# Patient Record
Sex: MALE | Race: WHITE | Employment: FULL TIME | URBAN - METROPOLITAN AREA
[De-identification: names, ages, dates, MRNs, and addresses within clinical notes are randomized per-mention and may not be internally consistent; named-entity substitution may affect disease eponyms.]

---

## 2017-01-20 ENCOUNTER — TELEPHONE (OUTPATIENT)
Dept: FAMILY MEDICINE | Facility: CLINIC | Age: 42
End: 2017-01-20

## 2017-01-20 RX ORDER — ATORVASTATIN CALCIUM 40 MG/1
40 TABLET, FILM COATED ORAL DAILY
Qty: 90 TABLET | Refills: 0 | Status: SHIPPED | OUTPATIENT
Start: 2017-01-20 | End: 2017-07-31

## 2017-01-20 NOTE — TELEPHONE ENCOUNTER
Pt notified refill sent to pharmacy and he will be due for fasting lab and ov in April.  Thalia Clarke RN

## 2017-01-20 NOTE — TELEPHONE ENCOUNTER
Patient is currently out of town for work  running very low on his atorvastatin, wondering if a script could be called in for this. Please call to advise.

## 2017-07-13 NOTE — LETTER
[facility]                                             [address] [place]  [address]    July 14, 2017    Norberto Doty  [address]  [place] [address]    Dear Norberto,       We recently received a refill request for metFORMIN (GLUCOPHAGE-XR) 500 MG 24 hr tablet.  We have refilled this for a one time supply only because you are due for a:    Diabetes office visit      Please call at your earliest convenience so that there will not be a delay with your future refills.          Thank you,   Your [facility] Care Team/ks  [phone]

## 2017-07-14 RX ORDER — METFORMIN HCL 500 MG
TABLET, EXTENDED RELEASE 24 HR ORAL
Qty: 120 TABLET | Refills: 0 | Status: SHIPPED | OUTPATIENT
Start: 2017-07-14 | End: 2017-07-31

## 2017-07-14 NOTE — TELEPHONE ENCOUNTER
Medication is being filled for 1 time refill only due to:  Patient needs to be seen because overdue for office visit for diabetes.    please send letter. Angela Velazco RN

## 2017-07-25 ENCOUNTER — TELEPHONE (OUTPATIENT)
Dept: FAMILY MEDICINE | Facility: CLINIC | Age: 42
End: 2017-07-25

## 2017-07-25 NOTE — LETTER
[facility]  [address] [facility] [address]  Phone: [phone]    07/25/17    Norberto Doty  [address]  [place] [address]      To whom it may concern:     Our records indicate that you have not scheduled for a(n)appointment with  Triston Hills MD for a diabetic check which was recommended by your health care team. Monitoring and managing your preventative and chronic health conditions are very important to us.     If you have received your health care elsewhere, please provide us with that information so it can be documented in your chart.    Please call [phone] or message us through your Sqoot account to schedule an appointment or provide information for your chart.     I look forward to seeing you and working with you on your health care needs.       *If you have already scheduled an appointment, please disregard this reminder      Sincerely,      Triston Hills MD/mark

## 2017-07-25 NOTE — TELEPHONE ENCOUNTER
Panel Management Review      Patient has the following on his problem list:     Diabetes    ASA: Not Required     Last A1C  Lab Results   Component Value Date    A1C 6.8 06/27/2016    A1C 14.6 04/21/2016     A1C tested: MONITOR    Last LDL:    Lab Results   Component Value Date    CHOL 180 04/21/2016     Lab Results   Component Value Date    HDL 37 04/21/2016     Lab Results   Component Value Date     04/21/2016     Lab Results   Component Value Date    TRIG 149 04/21/2016     No results found for: CHOLHDLRATIO  Lab Results   Component Value Date    NHDL 143 04/21/2016       Is the patient on a Statin? YES             Is the patient on Aspirin? NO    Medications     HMG CoA Reductase Inhibitors    atorvastatin (LIPITOR) 40 MG tablet    Salicylates    aspirin  MG tablet          Last three blood pressure readings:  BP Readings from Last 3 Encounters:   08/02/16 117/75   07/31/16 145/80   05/23/16 132/76       Date of last diabetes office visit: 2016     Tobacco History:     History   Smoking Status     Never Smoker   Smokeless Tobacco     Never Used             Composite cancer screening  Chart review shows that this patient is due/due soon for the following None  Summary:    Patient is due/failing the following:   A1C    Action needed:   Patient needs office visit for diabetic check.    Type of outreach:    Sent letter.    Questions for provider review:    None                                                                                                                                    Dillon Davies CMA       Chart routed to closed .

## 2017-07-31 ENCOUNTER — OFFICE VISIT (OUTPATIENT)
Dept: FAMILY MEDICINE | Facility: CLINIC | Age: 42
End: 2017-07-31
Payer: COMMERCIAL

## 2017-07-31 VITALS
TEMPERATURE: 97.5 F | WEIGHT: 315 LBS | HEART RATE: 85 BPM | BODY MASS INDEX: 42.66 KG/M2 | SYSTOLIC BLOOD PRESSURE: 135 MMHG | DIASTOLIC BLOOD PRESSURE: 80 MMHG | HEIGHT: 72 IN

## 2017-07-31 DIAGNOSIS — E66.01 MORBID OBESITY DUE TO EXCESS CALORIES (H): Primary | ICD-10-CM

## 2017-07-31 LAB
CREAT SERPL-MCNC: 0.9 MG/DL (ref 0.66–1.25)
CREAT UR-MCNC: 147 MG/DL
GFR SERPL CREATININE-BSD FRML MDRD: NORMAL ML/MIN/1.7M2
HBA1C MFR BLD: 9.4 % (ref 4.3–6)
MICROALBUMIN UR-MCNC: 49 MG/L
MICROALBUMIN/CREAT UR: 33.4 MG/G CR (ref 0–17)

## 2017-07-31 PROCEDURE — 83036 HEMOGLOBIN GLYCOSYLATED A1C: CPT | Performed by: FAMILY MEDICINE

## 2017-07-31 PROCEDURE — 99207 C PAF COMPLETED  NO CHARGE: CPT | Performed by: FAMILY MEDICINE

## 2017-07-31 PROCEDURE — 99214 OFFICE O/P EST MOD 30 MIN: CPT | Mod: 25 | Performed by: FAMILY MEDICINE

## 2017-07-31 PROCEDURE — 36415 COLL VENOUS BLD VENIPUNCTURE: CPT | Performed by: FAMILY MEDICINE

## 2017-07-31 PROCEDURE — 82043 UR ALBUMIN QUANTITATIVE: CPT | Performed by: FAMILY MEDICINE

## 2017-07-31 PROCEDURE — 82565 ASSAY OF CREATININE: CPT | Performed by: FAMILY MEDICINE

## 2017-07-31 PROCEDURE — 99207 C FOOT EXAM  NO CHARGE: CPT | Mod: 25 | Performed by: FAMILY MEDICINE

## 2017-07-31 RX ORDER — METFORMIN HCL 500 MG
TABLET, EXTENDED RELEASE 24 HR ORAL
Qty: 120 TABLET | Refills: 0 | Status: SHIPPED | OUTPATIENT
Start: 2017-07-31 | End: 2017-08-07

## 2017-07-31 RX ORDER — LISINOPRIL 2.5 MG/1
2.5 TABLET ORAL DAILY
Qty: 90 TABLET | Refills: 0 | Status: SHIPPED | OUTPATIENT
Start: 2017-07-31 | End: 2017-09-27

## 2017-07-31 RX ORDER — ATORVASTATIN CALCIUM 40 MG/1
40 TABLET, FILM COATED ORAL DAILY
Qty: 90 TABLET | Refills: 0 | Status: SHIPPED | OUTPATIENT
Start: 2017-07-31 | End: 2017-09-27

## 2017-07-31 NOTE — PROGRESS NOTES
Diabetic check   Was off medications for a couple weeks because he ran out of them but is taking them regularly now.  SUBJECTIVE:  Norberto Doty presents today for follow up of DIABETES MELLITUS .       Non compliant with medication for the last month at least,    Patient glucose self monitoring: one time daily, once daily.  Blood glucose averages: 250+  Symptoms of low blood sugar (hypoglycemia)? n  Problems taking medications regularly? y   Side effects? n  What are you doing for exercise outside of work or your daily activities? At work  Reviewed health maintenance  Patient Active Problem List   Diagnosis     Punctate keratitis, bilateral     Uncontrolled diabetes mellitus type 2 without complications (H)       Smokes n      BP:   BP Readings from Last 3 Encounters:   07/31/17 135/80   08/02/16 117/75   07/31/16 145/80       Lab Results   Component Value Date    A1C 9.4 07/31/2017    A1C 6.8 06/27/2016    A1C 14.6 04/21/2016       Recent Labs   Lab Test  04/21/16   0908   CHOL  180   HDL  37*   LDL  113*   TRIG  149       Wt Readings from Last 3 Encounters:   07/31/17 (!) 331 lb (150.1 kg)   08/02/16 (!) 317 lb 12.8 oz (144.2 kg)   07/31/16 (!) 318 lb (144.2 kg)     Asa is included in med list    Current Outpatient Prescriptions   Medication Sig Dispense Refill     metFORMIN (GLUCOPHAGE-XR) 500 MG 24 hr tablet TAKE TWO TABLETS BY MOUTH TWICE DAILY WITH MEALS 120 tablet 0     atorvastatin (LIPITOR) 40 MG tablet Take 1 tablet (40 mg) by mouth daily 90 tablet 0     lisinopril (PRINIVIL/ZESTRIL) 2.5 MG tablet Take 1 tablet (2.5 mg) by mouth daily 90 tablet 0     metFORMIN (GLUCOPHAGE-XR) 500 MG 24 hr tablet Take 2 tablets (1,000 mg) by mouth 2 times daily (with meals) 360 tablet 0     aspirin  MG tablet Take 1 tablet (325 mg) by mouth daily 90 tablet 3     Carboxymeth-Glycerin-Polysorb (REFRESH OPTIVE ADVANCED) 0.5-1-0.5 % SOLN Apply 1 drop to eye 2 times daily         Histories reviewed and updated in  Epic.       EXAM:  Vitals: /80  Pulse 85  Temp 97.5  F (36.4  C) (Oral)  Ht 6' (1.829 m)  Wt (!) 331 lb (150.1 kg)  BMI 44.89 kg/m2  BMIE= Body mass index is 44.89 kg/(m^2).  GENERAL APPEARANCE: alert and no acute distress  PSYCH: mentation appears normal and affect normal/bright  RESP: lungs clear to auscultation - no rales, rhonchi or wheezes  CV: regular rate and rhythm, normal S1 S2, no S3 or S4 and no murmur, click or rub -  EXT: no cyanosis or edema in lower extremities  SKIN: no venous stasis changes    ICD-10-CM    1. Uncontrolled diabetes mellitus type 2 without complications (H) E11.65 FOOT EXAM  NO CHARGE [54069.114]     CREATININE     HEMOGLOBIN A1C     Albumin Random Urine Quantitative     PAF COMPLETED     Lipid panel reflex to direct LDL     CANCELED: Lipid panel reflex to direct LDL    PLAN:    Follow up with Dr. Chang for obesity and diabetic education for possible Victoza  Over  half of theTotal time 25 minutes spent in cordination care. Discussed diagnoses, prognoses and treatment.

## 2017-07-31 NOTE — MR AVS SNAPSHOT
After Visit Summary   7/31/2017    Norberto Doty    MRN: [id number]           Patient Information     Date Of Birth          1975        Visit Information        Provider Department      7/31/2017 3:45 PM Triston Hills MD [facility]        Today's Diagnoses     Morbid obesity due to excess calories (H)    -  1    Uncontrolled type 2 diabetes mellitus without complication, without long-term current use of insulin (H)           Follow-ups after your visit        Additional Services     DIABETES EDUCATOR REFERRAL       DIABETES SELF MANAGEMENT TRAINING (DSMT)      Your provider has referred you to Diabetes Education: [facility]: Diabetes Education - All [facility] [phone]   [email or web address]    Type of training and number of hours: Previous Diagnosis: Follow-up DSMT - 2 hours.    Medicare covers: 10 hours of initial DSMT in 12 month period from the time of first visit, plus 2 hours of follow-up DSMT annually, and additional hours as requested for insulin training.    Diabetes Type: Type 2 - On Oral Medication             Diabetes Co-Morbidities: none               A1C Goal:  Less than 7       A1C is: Lab Results       Component                Value               Date                       A1C                      9.4                 07/31/2017              If an urgent visit is needed or A1C is above 12, Care Team to call the Diabetes Education Team at [phone] or send an In Basket message to the Diabetes Education Pool (P DIAB ED-PATIENT CARE).    Diabetes Education Topics: Comprehensive Knowledge Assessment and Instruction    Special Educational Needs Requiring Individual DSMT: None       MEDICAL NUTRITION THERAPY (MNT) for Diabetes    Medical Nutrition Therapy with a Registered Dietitian can be provided in coordination with Diabetes Self-Management Training to assist in achieving optimal diabetes management.                           Medicare will cover: 3 hours initial MNT in 12 month period after first visit, plus 2 hours of follow-up MNT annually    Please be aware that coverage of these services is subject to the terms and limitations of your health insurance plan.  Call member services at your health plan to determine Diabetes Self-Management Training benefits and ask which blood glucose monitor brands are covered by your plan.      Please bring the following with you to your appointment:    (1)  List of current medications   (2)  List of Blood Glucose Monitor brands that are covered by your insurance plan  (3)  Blood Glucose Monitor and log book  (4)   Food records for the 3 days prior to your visit    The Certified Diabetes Educator may make diabetes medication adjustments per the CDE Protocol and Collaborative Practice Agreement.                  Your next 10 appointments already scheduled     Aug 07, 2017  8:00 AM CDT   [facility] LAB   [facility]    [address] [place] [address] [phone]           Patient must bring picture ID. Patient should be prepared to give a urine specimen  Please do not eat 10-12 hours before your appointment if you are coming in fasting for labs on lipids, cholesterol, or glucose (sugar). Pregnant women should follow their Care Team instructions. Water with medications is okay. Do not drink coffee or other fluids. If you have concerns about taking  your medications, please ask at office or if scheduling via Murfie, send a message by clicking on Secure Messaging, Message Your Care Team.              Future tests that were ordered for you today     Open Future Orders        Priority Expected Expires Ordered    Lipid panel reflex to direct LDL Routine  7/31/2018 7/31/2017            Who to contact     If you have questions or need follow up information about today's clinic visit or your schedule please contact [facility]  "directly at [phone].  Normal or non-critical lab and imaging results will be communicated to you by MyChart, letter or phone within 4 business days after the clinic has received the results. If you do not hear from us within 7 days, please contact the clinic through [email or web address] or phone. If you have a critical or abnormal lab result, we will notify you by phone as soon as possible.  Submit refill requests through Quincus or call your pharmacy and they will forward the refill request to us. Please allow 3 business days for your refill to be completed.          Additional Information About Your Visit        [email or web address] Information     Quincus lets you send messages to your doctor, view your test results, renew your prescriptions, schedule appointments and more. To sign up, go to [email or web address] . Click on \"Log in\" on the left side of the screen, which will take you to the Welcome page. Then click on \"Sign up Now\" on the right side of the page.     You will be asked to enter the access code listed below, as well as some personal information. Please follow the directions to create your username and password.     Your access code is: [email or web address]  Expires: 10/29/2017  5:17 PM     Your access code will  in 90 days. If you need help or a new code, please call your [facility] clinic or [phone].        Care EveryWhere ID     This is your Care EveryWhere ID. This could be used by other organizations to access your [facility] medical records  [id number]        Your Vitals Were     Pulse Temperature Height BMI (Body Mass Index)          85 97.5  F (36.4  C) (Oral) 6' (1.829 m) 44.89 kg/m2         Blood Pressure from Last 3 Encounters:   17 135/80   16 117/75   16 145/80    Weight from Last 3 Encounters:   17 (!) 331 lb (150.1 kg)   16 (!) 317 lb 12.8 oz (144.2 kg)   16 (!) 318 lb (144.2 kg)              We Performed the Following     Albumin Random Urine Quantitative     " CREATININE     DIABETES EDUCATOR REFERRAL     FOOT EXAM  NO CHARGE [78380.114]     HEMOGLOBIN A1C     PAF COMPLETED        Primary Care Provider Office Phone # Fax #    [facility] [phone]       No address on file        Equal Access to Services     BINU PELAYO : Hadbibi kimberlyn alexander xiomy Sopradeep, waaxda luqadaha, qaybta kaalmada adechris, magy wells laTejakaci weston. So [facility] [phone].    ATENCIÓN: Si habla español, tiene a hall disposición servicios gratuitos de asistencia lingüística. Llame al [phone].    We comply with applicable federal civil rights laws and [place] laws. We do not discriminate on the basis of race, color, national origin, age, disability sex, sexual orientation or gender identity.            Thank you!     Thank you for choosing [facility]  for your care. Our goal is always to provide you with excellent care. Hearing back from our patients is one way we can continue to improve our services. Please take a few minutes to complete the written survey that you may receive in the mail after your visit with us. Thank you!             Your Updated Medication List - Protect others around you: Learn how to safely use, store and throw away your medicines at www.disposemymeds.org.          This list is accurate as of: 7/31/17  5:18 PM.  Always use your most recent med list.                   Brand Name Dispense Instructions for use Diagnosis    aspirin  MG EC tablet     90 tablet    Take 1 tablet (325 mg) by mouth daily    Uncontrolled diabetes mellitus type 2 without complications (H)       atorvastatin 40 MG tablet    LIPITOR    90 tablet    Take 1 tablet (40 mg) by mouth daily    Uncontrolled diabetes mellitus type 2 without complications (H)       Carboxymeth-Glycerin-Polysorb 0.5-1-0.5 % Soln    REFRESH OPTIVE ADVANCED     Apply 1 drop to eye 2 times daily    Punctate keratitis, bilateral       lisinopril 2.5 MG tablet     PRINIVIL/Zestril    90 tablet    Take 1 tablet (2.5 mg) by mouth daily    Uncontrolled diabetes mellitus type 2 without complications (H)       * metFORMIN 500 MG 24 hr tablet    GLUCOPHAGE-XR    360 tablet    Take 2 tablets (1,000 mg) by mouth 2 times daily (with meals)    Uncontrolled diabetes mellitus type 2 without complications (H)       * metFORMIN 500 MG 24 hr tablet    GLUCOPHAGE-XR    120 tablet    TAKE TWO TABLETS BY MOUTH TWICE DAILY WITH MEALS    Uncontrolled diabetes mellitus type 2 without complications (H)       * Notice:  This list has 2 medication(s) that are the same as other medications prescribed for you. Read the directions carefully, and ask your doctor or other care provider to review them with you.

## 2017-07-31 NOTE — NURSING NOTE
Chief Complaint   Patient presents with     Diabetes       Initial /80  Pulse 85  Temp 97.5  F (36.4  C) (Oral)  Ht 6' (1.829 m)  Wt (!) 331 lb (150.1 kg)  BMI 44.89 kg/m2 Estimated body mass index is 44.89 kg/(m^2) as calculated from the following:    Height as of this encounter: 6' (1.829 m).    Weight as of this encounter: 331 lb (150.1 kg).  Medication Reconciliation: roman Goodwin MA

## 2017-08-03 ENCOUNTER — TELEPHONE (OUTPATIENT)
Dept: FAMILY MEDICINE | Facility: CLINIC | Age: 42
End: 2017-08-03

## 2017-08-03 NOTE — TELEPHONE ENCOUNTER
Diabetes Education Scheduling Outreach #1:    Call to patient to schedule. Left message with phone number to call to schedule.    Plan for 2nd outreach attempt within 1 week.    Leonides Sharma OnCall  Diabetes and Nutrition Scheduling

## 2017-08-07 ENCOUNTER — TELEPHONE (OUTPATIENT)
Dept: FAMILY MEDICINE | Facility: CLINIC | Age: 42
End: 2017-08-07

## 2017-08-07 ENCOUNTER — OFFICE VISIT (OUTPATIENT)
Dept: FAMILY MEDICINE | Facility: CLINIC | Age: 42
End: 2017-08-07
Payer: COMMERCIAL

## 2017-08-07 VITALS
SYSTOLIC BLOOD PRESSURE: 130 MMHG | TEMPERATURE: 97.2 F | WEIGHT: 315 LBS | BODY MASS INDEX: 44.76 KG/M2 | HEART RATE: 87 BPM | DIASTOLIC BLOOD PRESSURE: 80 MMHG

## 2017-08-07 DIAGNOSIS — K52.9 CHRONIC DIARRHEA: Primary | ICD-10-CM

## 2017-08-07 LAB
CHOLEST SERPL-MCNC: 166 MG/DL
HDLC SERPL-MCNC: 48 MG/DL
LDLC SERPL CALC-MCNC: 78 MG/DL
NONHDLC SERPL-MCNC: 118 MG/DL
TRIGL SERPL-MCNC: 202 MG/DL

## 2017-08-07 PROCEDURE — 99213 OFFICE O/P EST LOW 20 MIN: CPT | Performed by: FAMILY MEDICINE

## 2017-08-07 PROCEDURE — 36415 COLL VENOUS BLD VENIPUNCTURE: CPT | Performed by: FAMILY MEDICINE

## 2017-08-07 PROCEDURE — 80061 LIPID PANEL: CPT | Performed by: FAMILY MEDICINE

## 2017-08-07 RX ORDER — METFORMIN HCL 500 MG
500 TABLET, EXTENDED RELEASE 24 HR ORAL 2 TIMES DAILY WITH MEALS
Qty: 120 TABLET | Refills: 0 | COMMUNITY
Start: 2017-08-07 | End: 2018-04-17

## 2017-08-07 NOTE — TELEPHONE ENCOUNTER
"Patient states was told to take imodium for his diarrhea.  He is going to go and pick it up.  Wondering if has to ask pharmacy for it or if available on the shelf.  Did review that it's available \"on the shelf\"  Typically by antidiarrheals, ie peptobismal, etc.  Did instruct him to take as per package instructions; typically 4mg at onset of diarrhea and then 2 mg if additional bout of diarrhea.  Not to take more than 8mg daily.  He states he will follow package instructions.  Mesha Nicole RN    "

## 2017-08-07 NOTE — MR AVS SNAPSHOT
"              After Visit Summary   2017    Norberto Doty    MRN: [id number]           Patient Information     Date Of Birth          1975        Visit Information        Provider Department      2017 4:00 PM Triston Hills MD [facility]        Today's Diagnoses     Chronic diarrhea    -  1    Uncontrolled type 2 diabetes mellitus without complication, without long-term current use of insulin (H)           Follow-ups after your visit        Who to contact     If you have questions or need follow up information about today's clinic visit or your schedule please contact [facility] directly at [phone].  Normal or non-critical lab and imaging results will be communicated to you by MyChart, letter or phone within 4 business days after the clinic has received the results. If you do not hear from us within 7 days, please contact the clinic through MiiPharoshart or phone. If you have a critical or abnormal lab result, we will notify you by phone as soon as possible.  Submit refill requests through Booktrack or call your pharmacy and they will forward the refill request to us. Please allow 3 business days for your refill to be completed.          Additional Information About Your Visit        MyChart Information     Booktrack lets you send messages to your doctor, view your test results, renew your prescriptions, schedule appointments and more. To sign up, go to [email or web address] . Click on \"Log in\" on the left side of the screen, which will take you to the Welcome page. Then click on \"Sign up Now\" on the right side of the page.     You will be asked to enter the access code listed below, as well as some personal information. Please follow the directions to create your username and password.     Your access code is: [email or web address]  Expires: 10/29/2017  5:17 PM     Your access code will  in 90 days. If you need help or a new code, please call your [facility] or " [phone].        Care EveryWhere ID     This is your Care EveryWhere ID. This could be used by other organizations to access your [facility] medical records  [id number]        Your Vitals Were     Pulse Temperature BMI (Body Mass Index)             87 97.2  F (36.2  C) (Oral) 44.76 kg/m2          Blood Pressure from Last 3 Encounters:   08/07/17 130/80   07/31/17 135/80   08/02/16 117/75    Weight from Last 3 Encounters:   08/07/17 (!) 330 lb (149.7 kg)   07/31/17 (!) 331 lb (150.1 kg)   08/02/16 (!) 317 lb 12.8 oz (144.2 kg)              Today, you had the following     No orders found for display         Today's Medication Changes          These changes are accurate as of: 8/7/17  4:46 PM.  If you have any questions, ask your nurse or doctor.               These medicines have changed or have updated prescriptions.        Dose/Directions    * metFORMIN 500 MG 24 hr tablet   Commonly known as:  GLUCOPHAGE-XR   This may have changed:  Another medication with the same name was changed. Make sure you understand how and when to take each.   Used for:  Uncontrolled diabetes mellitus type 2 without complications (H)   Changed by:  Triston Hills MD        Dose:  1000 mg   Take 2 tablets (1,000 mg) by mouth 2 times daily (with meals)   Quantity:  360 tablet   Refills:  0       * metFORMIN 500 MG 24 hr tablet   Commonly known as:  GLUCOPHAGE-XR   This may have changed:    - how much to take  - how to take this  - when to take this   Used for:  Uncontrolled type 2 diabetes mellitus without complication, without long-term current use of insulin (H)   Changed by:  Triston Hills MD        Dose:  500 mg   Take 1 tablet (500 mg) by mouth 2 times daily (with meals) TAKE TWO TABLETS BY MOUTH TWICE DAILY WITH MEALS   Quantity:  120 tablet   Refills:  0       * Notice:  This list has 2 medication(s) that are the same as other medications prescribed for you. Read the directions carefully, and ask your doctor or  other care provider to review them with you.             Primary Care Provider Office Phone # Fax #    Triston Hills [phone]       [facility] [address] [facility] [address] [facility] [address]        Equal Access to Services     BINU PELAYO : Hadii kimberlyn ku hademilianoo Soomaali, waaxda luqadaha, qaybta kaalmada adeegyada, waxdani idiin hayiyfann chaoflorin wells yanira weston. So [facility] [phone].    ATENCIÓN: Si habla español, tiene a hall disposición servicios gratuitos de asistencia lingüística. Llame al [phone].    We comply with applicable federal civil rights laws and [place] laws. We do not discriminate on the basis of race, color, national origin, age, disability sex, sexual orientation or gender identity.            Thank you!     Thank you for choosing [facility]  for your care. Our goal is always to provide you with excellent care. Hearing back from our patients is one way we can continue to improve our services. Please take a few minutes to complete the written survey that you may receive in the mail after your visit with us. Thank you!             Your Updated Medication List - Protect others around you: Learn how to safely use, store and throw away your medicines at www.disposemymeds.org.          This list is accurate as of: 8/7/17  4:46 PM.  Always use your most recent med list.                   Brand Name Dispense Instructions for use Diagnosis    aspirin  MG EC tablet     90 tablet    Take 1 tablet (325 mg) by mouth daily    Uncontrolled diabetes mellitus type 2 without complications (H)       atorvastatin 40 MG tablet    LIPITOR    90 tablet    Take 1 tablet (40 mg) by mouth daily    Uncontrolled type 2 diabetes mellitus without complication, without long-term current use of insulin (H)       Carboxymeth-Glycerin-Polysorb 0.5-1-0.5 % Soln    REFRESH OPTIVE ADVANCED     Apply 1 drop to eye 2 times daily    Punctate keratitis, bilateral       lisinopril 2.5 MG  tablet    PRINIVIL/Zestril    90 tablet    Take 1 tablet (2.5 mg) by mouth daily    Uncontrolled type 2 diabetes mellitus without complication, without long-term current use of insulin (H)       * metFORMIN 500 MG 24 hr tablet    GLUCOPHAGE-XR    360 tablet    Take 2 tablets (1,000 mg) by mouth 2 times daily (with meals)    Uncontrolled diabetes mellitus type 2 without complications (H)       * metFORMIN 500 MG 24 hr tablet    GLUCOPHAGE-XR    120 tablet    Take 1 tablet (500 mg) by mouth 2 times daily (with meals) TAKE TWO TABLETS BY MOUTH TWICE DAILY WITH MEALS    Uncontrolled type 2 diabetes mellitus without complication, without long-term current use of insulin (H)       * Notice:  This list has 2 medication(s) that are the same as other medications prescribed for you. Read the directions carefully, and ask your doctor or other care provider to review them with you.

## 2017-08-07 NOTE — PROGRESS NOTES
SUBJECTIVE:  41 year old.The patient has a history of diarrhea. That is in the morning.  He had been non compliant with metformin.  This started over 1month ago quality soft stool Associated symptoms are rectal pressure before the bowel movement .  Brought on by in the morning .  Better with the day going by.. ROS no fever,chills melena or blood in stools      Reviewed health maintenance  Patient Active Problem List   Diagnosis     Punctate keratitis, bilateral     Uncontrolled diabetes mellitus type 2 without complications (H)     Morbid obesity due to excess calories (H)     Past Medical History:   Diagnosis Date     Diabetes (H)      NO ACTIVE PROBLEMS        OBJECTIVE:  no apparent distress  /80  Pulse 87  Temp 97.2  F (36.2  C) (Oral)  Wt (!) 330 lb (149.7 kg)  BMI 44.76 kg/m2    LUNGS:  CTA B/L, no wheezing or crackles.   Cardiovascular: negative, PMI normal. No lifts, heaves, or thrills. RRR. No murmurs, clicks gallops or rub   Gastrointestinal: Abdomen soft, non-tender. BS normal. No masses, organomegaly       ICD-10-CM    1. Chronic diarrhea K52.9     PLAN: imodium  Metformin bid instead of 2 bid

## 2017-08-07 NOTE — NURSING NOTE
Chief Complaint   Patient presents with     Diarrhea     x 2 weeks, worse in the AM, will go 6 times in 4 hours        Initial BP (!) 144/93  Pulse 87  Temp 97.2  F (36.2  C) (Oral)  Wt (!) 330 lb (149.7 kg)  BMI 44.76 kg/m2 Estimated body mass index is 44.76 kg/(m^2) as calculated from the following:    Height as of 7/31/17: 6' (1.829 m).    Weight as of this encounter: 330 lb (149.7 kg).  Medication Reconciliation: complete  Dillon Davies, CMA

## 2017-08-17 NOTE — TELEPHONE ENCOUNTER
Diabetes Education Scheduling Outreach #2:    Call to patient to schedule. Left message with phone number to call to schedule.    Letter sent to patient requesting to call to schedule.    Leonides Sharma OnCall  Diabetes and Nutrition Scheduling

## 2017-09-28 RX ORDER — METFORMIN HCL 500 MG
TABLET, EXTENDED RELEASE 24 HR ORAL
Qty: 360 TABLET | Refills: 1 | Status: SHIPPED | OUTPATIENT
Start: 2017-09-28 | End: 2018-04-17

## 2017-09-28 RX ORDER — ATORVASTATIN CALCIUM 40 MG/1
TABLET, FILM COATED ORAL
Qty: 90 TABLET | Refills: 3 | Status: SHIPPED | OUTPATIENT
Start: 2017-09-28 | End: 2018-04-17

## 2017-09-28 RX ORDER — LISINOPRIL 2.5 MG/1
TABLET ORAL
Qty: 90 TABLET | Refills: 1 | Status: SHIPPED | OUTPATIENT
Start: 2017-09-28 | End: 2018-03-14

## 2017-11-01 ENCOUNTER — TELEPHONE (OUTPATIENT)
Dept: FAMILY MEDICINE | Facility: CLINIC | Age: 42
End: 2017-11-01

## 2017-11-01 NOTE — LETTER
[facility]  [address] [facility] [address]  Phone: [phone]    11/01/17    Norberto Laureanoyogesh  [address] [facility]  [place] [address]      To whom it may concern:     Our records indicate that you have not scheduled for a(n)appointment with  Gayatri Chang MD and Diabetic Educator which was recommended by your health care team. Monitoring and managing your preventative and chronic health conditions are very important to us.     If you have received your health care elsewhere, please provide us with that information so it can be documented in your chart.    Please call [phone] or message us through your [employer] account to schedule an appointment or provide information for your chart.     I look forward to seeing you and working with you on your health care needs.       *If you have already scheduled an appointment, please disregard this reminder      Sincerely,      Triston Hills MD/mark

## 2017-11-01 NOTE — TELEPHONE ENCOUNTER
Panel Management Review      Patient has the following on his problem list:     Diabetes    ASA: Passed    Last A1C  Lab Results   Component Value Date    A1C 9.4 07/31/2017    A1C 6.8 06/27/2016    A1C 14.6 04/21/2016     A1C tested: FAILED    Last LDL:    Lab Results   Component Value Date    CHOL 166 08/07/2017     Lab Results   Component Value Date    HDL 48 08/07/2017     Lab Results   Component Value Date    LDL 78 08/07/2017     Lab Results   Component Value Date    TRIG 202 08/07/2017     No results found for: CHOLHDLRATIO  Lab Results   Component Value Date    NHDL 118 08/07/2017       Is the patient on a Statin? YES             Is the patient on Aspirin? YES    Medications     HMG CoA Reductase Inhibitors    atorvastatin (LIPITOR) 40 MG tablet    Salicylates    aspirin  MG tablet          Last three blood pressure readings:  BP Readings from Last 3 Encounters:   08/07/17 130/80   07/31/17 135/80   08/02/16 117/75       Date of last diabetes office visit: 7/2017     Tobacco History:     History   Smoking Status     Never Smoker   Smokeless Tobacco     Never Used             Composite cancer screening  Chart review shows that this patient is due/due soon for the following None  Summary:    Patient is due/failing the following:   A1C    Action needed:   Patient needs office visit for diabetic education and visit with Dr. Chang.    Type of outreach:    Sent letter.    Questions for provider review:    None                                                                                                                                    Dillon Davies CMA       Chart routed to closed .

## 2018-02-06 RX ORDER — METFORMIN HCL 500 MG
1000 TABLET, EXTENDED RELEASE 24 HR ORAL 2 TIMES DAILY WITH MEALS
Qty: 120 TABLET | Refills: 0 | Status: SHIPPED | OUTPATIENT
Start: 2018-02-06 | End: 2018-03-14

## 2018-02-06 NOTE — LETTER
February 9, 2018    Norberto Doty  [address]  [place] [address]    Dear Norberto,       We recently received a refill request for metFORMIN (GLUCOPHAGE-XR) 500 MG 24 hr tablet.  We have refilled this for a one time 30 day supply only because you are due for a:    Diabetes office visit and Non-fasting lab appointment      Please schedule this lab appointment 4-5 days prior to the office visit.     Please call at your earliest convenience so that there will not be a delay with your future refills.          Thank you,   Your [facility] Team/ks  [phone]

## 2018-02-07 NOTE — TELEPHONE ENCOUNTER
Medication is being filled for 1 time refill only due to:  Overdue for visit and labs.      - please send reminder.   .Aileen Romero RN

## 2018-02-15 ENCOUNTER — TELEPHONE (OUTPATIENT)
Dept: FAMILY MEDICINE | Facility: CLINIC | Age: 43
End: 2018-02-15

## 2018-02-15 NOTE — TELEPHONE ENCOUNTER
Panel Management Review      Patient has the following on his problem list:     Diabetes    ASA: Passed    Last A1C  Lab Results   Component Value Date    A1C 9.4 07/31/2017    A1C 6.8 06/27/2016    A1C 14.6 04/21/2016     A1C tested: FAILED    Last LDL:    Lab Results   Component Value Date    CHOL 166 08/07/2017     Lab Results   Component Value Date    HDL 48 08/07/2017     Lab Results   Component Value Date    LDL 78 08/07/2017     Lab Results   Component Value Date    TRIG 202 08/07/2017     No results found for: CHOLHDLRATIO  Lab Results   Component Value Date    NHDL 118 08/07/2017       Is the patient on a Statin? YES             Is the patient on Aspirin? YES    Medications     HMG CoA Reductase Inhibitors    atorvastatin (LIPITOR) 40 MG tablet    Salicylates    aspirin  MG tablet          Last three blood pressure readings:  BP Readings from Last 3 Encounters:   08/07/17 130/80   07/31/17 135/80   08/02/16 117/75       Date of last diabetes office visit: 8/2017     Tobacco History:     History   Smoking Status     Never Smoker   Smokeless Tobacco     Never Used           Composite cancer screening  Chart review shows that this patient is due/due soon for the following None  Summary:    Patient is due/failing the following:   A1C    Action needed:   Patient needs office visit for diabetic check .    Type of outreach:    None. Letter recently sent    Questions for provider review:    None                                                                                                                                    Dillon Davies CMA       Chart routed to closed .

## 2018-03-14 ENCOUNTER — TELEPHONE (OUTPATIENT)
Dept: FAMILY MEDICINE | Facility: CLINIC | Age: 43
End: 2018-03-14

## 2018-03-14 RX ORDER — METFORMIN HCL 500 MG
1000 TABLET, EXTENDED RELEASE 24 HR ORAL 2 TIMES DAILY WITH MEALS
Qty: 8 TABLET | Refills: 0 | Status: SHIPPED | OUTPATIENT
Start: 2018-03-14 | End: 2018-04-17

## 2018-03-14 RX ORDER — LISINOPRIL 2.5 MG/1
2.5 TABLET ORAL DAILY
Qty: 2 TABLET | Refills: 0 | Status: SHIPPED | OUTPATIENT
Start: 2018-03-14 | End: 2018-04-17

## 2018-03-14 NOTE — TELEPHONE ENCOUNTER
Refill requests for metformin and lisinopril (only has enough for tonight)  He is out of town for work currently.  He still has some at home, so will need 2 more days worth. Please authorize to pharmacy listed, he will have it transferred to where he is currently at (he didn't have that pharmacy info)  Please call when done.

## 2018-03-14 NOTE — TELEPHONE ENCOUNTER
Left message on Norberto's voicemail letting him know I did send 2 day supply to [facility] of meds requested.  Let him know he is due NOW for office visit with Dr. Triston Hills for his diabetes.  Please schedule before next refill.  Mesha Nicole RN

## 2018-04-17 ENCOUNTER — OFFICE VISIT (OUTPATIENT)
Dept: FAMILY MEDICINE | Facility: CLINIC | Age: 43
End: 2018-04-17
Payer: COMMERCIAL

## 2018-04-17 VITALS
SYSTOLIC BLOOD PRESSURE: 123 MMHG | BODY MASS INDEX: 41.09 KG/M2 | HEART RATE: 73 BPM | DIASTOLIC BLOOD PRESSURE: 82 MMHG | TEMPERATURE: 98.4 F | RESPIRATION RATE: 16 BRPM | HEIGHT: 72 IN | WEIGHT: 303.4 LBS

## 2018-04-17 DIAGNOSIS — E11.65 TYPE 2 DIABETES MELLITUS WITH HYPERGLYCEMIA, WITH LONG-TERM CURRENT USE OF INSULIN (H): ICD-10-CM

## 2018-04-17 DIAGNOSIS — Z79.4 TYPE 2 DIABETES MELLITUS WITH HYPERGLYCEMIA, WITH LONG-TERM CURRENT USE OF INSULIN (H): ICD-10-CM

## 2018-04-17 DIAGNOSIS — E66.01 MORBID OBESITY (H): Primary | ICD-10-CM

## 2018-04-17 LAB — HBA1C MFR BLD: 12 % (ref 0–5.6)

## 2018-04-17 PROCEDURE — 99214 OFFICE O/P EST MOD 30 MIN: CPT | Performed by: FAMILY MEDICINE

## 2018-04-17 PROCEDURE — 83036 HEMOGLOBIN GLYCOSYLATED A1C: CPT | Performed by: FAMILY MEDICINE

## 2018-04-17 PROCEDURE — 36415 COLL VENOUS BLD VENIPUNCTURE: CPT | Performed by: FAMILY MEDICINE

## 2018-04-17 RX ORDER — LISINOPRIL 2.5 MG/1
2.5 TABLET ORAL DAILY
Qty: 2 TABLET | Refills: 0 | Status: SHIPPED | OUTPATIENT
Start: 2018-04-17 | End: 2018-04-17

## 2018-04-17 RX ORDER — ATORVASTATIN CALCIUM 40 MG/1
40 TABLET, FILM COATED ORAL DAILY
Qty: 90 TABLET | Refills: 3 | Status: SHIPPED | OUTPATIENT
Start: 2018-04-17 | End: 2019-05-14

## 2018-04-17 RX ORDER — METFORMIN HCL 500 MG
1000 TABLET, EXTENDED RELEASE 24 HR ORAL 2 TIMES DAILY WITH MEALS
Qty: 120 TABLET | Refills: 0 | Status: SHIPPED | OUTPATIENT
Start: 2018-04-17 | End: 2018-05-13

## 2018-04-17 NOTE — PROGRESS NOTES
SUBJECTIVE:  Norberto Doty presents today for follow up of DIABETES MELLITUS.       Patient concerns: polyuria    Patient glucose self monitoring: one time daily, once daily.  Blood glucose averages: 250Plus  Symptoms of low blood sugar (hypoglycemia)? n  Problems taking medications regularly? y   Side effects? none  What are you doing for exercise outside of work or your daily activities? no  Reviewed health maintenance  Patient Active Problem List   Diagnosis     Punctate keratitis, bilateral     Uncontrolled diabetes mellitus type 2 without complications (H)     Morbid obesity due to excess calories (H)     Morbid obesity (H)       Smokes n  PHQ-2  Over the last two weeks- Have you been bothered by little interest or pleasure in doing things?  No  Over the last two weeks- Have you been been feeling down, depressed, or hopeless?  No    BP:   BP Readings from Last 3 Encounters:   04/17/18 123/82   08/07/17 130/80   07/31/17 135/80       Lab Results   Component Value Date    A1C 12.0 04/17/2018    A1C 9.4 07/31/2017    A1C 6.8 06/27/2016       Recent Labs   Lab Test  08/07/17   0809  04/21/16   0908   CHOL  166  180   HDL  48  37*   LDL  78  113*   TRIG  202*  149       Wt Readings from Last 3 Encounters:   04/17/18 303 lb 6.4 oz (137.6 kg)   08/07/17 (!) 330 lb (149.7 kg)   07/31/17 (!) 331 lb (150.1 kg)     Asa is included in med list    Current Outpatient Prescriptions   Medication Sig Dispense Refill     lisinopril (PRINIVIL/ZESTRIL) 2.5 MG tablet Take 1 tablet (2.5 mg) by mouth daily 2 tablet 0     metFORMIN (GLUCOPHAGE-XR) 500 MG 24 hr tablet Take 2 tablets (1,000 mg) by mouth 2 times daily (with meals) Due for labs and office visit for refills. 8 tablet 0     atorvastatin (LIPITOR) 40 MG tablet TAKE ONE TABLET BY MOUTH ONCE DAILY 90 tablet 3     aspirin  MG tablet Take 1 tablet (325 mg) by mouth daily 90 tablet 3     Carboxymeth-Glycerin-Polysorb (REFRESH OPTIVE ADVANCED) 0.5-1-0.5 % SOLN Apply 1 drop  to eye 2 times daily       [DISCONTINUED] metFORMIN (GLUCOPHAGE-XR) 500 MG 24 hr tablet TAKE TWO TABLETS BY MOUTH TWICE DAILY WITH MEALS 360 tablet 1     [DISCONTINUED] metFORMIN (GLUCOPHAGE-XR) 500 MG 24 hr tablet Take 1 tablet (500 mg) by mouth 2 times daily (with meals) TAKE TWO TABLETS BY MOUTH TWICE DAILY WITH MEALS 120 tablet 0       Histories reviewed and updated in Epic.      EXAM:  Vitals: /82  Pulse 73  Temp 98.4  F (36.9  C) (Oral)  Resp 16  Ht 6' (1.829 m)  Wt 303 lb 6.4 oz (137.6 kg)  BMI 41.15 kg/m2  BMIE= Body mass index is 41.15 kg/(m^2).  GENERAL APPEARANCE: alert and no acute distress  PSYCH: mentation appears normal and affect normal/bright  RESP: lungs clear to auscultation - no rales, rhonchi or wheezes  CV: regular rate and rhythm, normal S1 S2, no S3 or S4 and no murmur, click or rub -  EXT: no cyanosis or edema in lower extremities  SKIN: no venous stasis changes    ICD-10-CM    1. Morbid obesity (H) E66.01    2. Uncontrolled type 2 diabetes mellitus without complication, without long-term current use of insulin (H) E11.65 HEMOGLOBIN A1C     lisinopril (PRINIVIL/ZESTRIL) 2.5 MG tablet     atorvastatin (LIPITOR) 40 MG tablet    PLAN:add insulin and Dr Chang for morbid obestiy

## 2018-04-17 NOTE — NURSING NOTE
Chief Complaint   Patient presents with     Diabetes       Initial /82  Pulse 73  Temp 98.4  F (36.9  C) (Oral)  Resp 16  Ht 6' (1.829 m)  Wt 303 lb 6.4 oz (137.6 kg)  BMI 41.15 kg/m2 Estimated body mass index is 41.15 kg/(m^2) as calculated from the following:    Height as of this encounter: 6' (1.829 m).    Weight as of this encounter: 303 lb 6.4 oz (137.6 kg).  Medication Reconciliation: complete  Dillon Davies, CMA

## 2018-04-17 NOTE — PATIENT INSTRUCTIONS
Set up appointment with Dr Chang with the obesity clinic at [facility]  Make appointment  With Sindhu lópez diabetic educator.  Four time per day blood sugars

## 2018-04-17 NOTE — MR AVS SNAPSHOT
After Visit Summary   4/17/2018    Norberto Doty    MRN: [id number]           Patient Information     Date Of Birth          1975        Visit Information        Provider Department      4/17/2018 8:30 AM Triston Hills MD [facility]        Today's Diagnoses     Morbid obesity (H)    -  1    Uncontrolled type 2 diabetes mellitus without complication, without long-term current use of insulin (H)        Type 2 diabetes mellitus with hyperglycemia, with long-term current use of insulin (H)          Care Instructions    Set up appointment with Dr Chang with the obesity clinic at [facility]  Make appointment  With Sindhu lópez diabetic educator.  Four time per day blood sugars          Follow-ups after your visit        Additional Services     DIABETES EDUCATOR REFERRAL       DIABETES SELF MANAGEMENT TRAINING (DSMT)      Your provider has referred you to Diabetes Education: FMG: Diabetes Education - All [facility] [phone]   [email or web address]     If an urgent visit is needed or A1C is above 12, Care Team to call the Diabetes  Education Team at [phone] or send an In Basket message to the Diabetes Education Pool (P DIAB ED-PATIENT CARE).    A  will call you to make your appointment. If it has been more than 3 business days since your referral was placed, please call the above phone number to schedule.    Type of training and number of hours: Previous Diagnosis: Follow-up DSMT - 2 hours.    Diabetes Type:uncontrolled diabetes    Diabetes Education Topics: Comprehensive Knowledge Assessment and Instruction    Special Educational Needs Requiring Individual DSMT: Additional Insulin Training      Please be aware that coverage of these services is subject to the terms and limitations of your health insurance plan.  Call member services at your health plan to determine Diabetes Self-Management Training (Codes  " and ) and Medical Nutrition Therapy (Codes 27498 and 56226) benefits and ask which blood glucose monitor brands are covered by your plan.  Please bring the following with you to your appointment:    (1)  List of current medications   (2)  List of Blood Glucose Monitor brands that are covered by your insurance plan  (3)  Blood Glucose Monitor and log book  (4)   Food records for the 3 days prior to your visit    The Certified Diabetes Educator may make diabetes medication adjustments per the CDE Protocol and Collaborative Practice Agreement.                  Who to contact     If you have questions or need follow up information about today's clinic visit or your schedule please contact [facility] directly at [phone].  Normal or non-critical lab and imaging results will be communicated to you by MyChart, letter or phone within 4 business days after the clinic has received the results. If you do not hear from us within 7 days, please contact the clinic through [employer] or phone. If you have a critical or abnormal lab result, we will notify you by phone as soon as possible.  Submit refill requests through [employer] or call your pharmacy and they will forward the refill request to us. Please allow 3 business days for your refill to be completed.          Additional Information About Your Visit        [employer] Information     [employer] lets you send messages to your doctor, view your test results, renew your prescriptions, schedule appointments and more. To sign up, go to [email or web address] . Click on \"Log in\" on the left side of the screen, which will take you to the Welcome page. Then click on \"Sign up Now\" on the right side of the page.     You will be asked to enter the access code listed below, as well as some personal information. Please follow the directions to create your username and password.     Your access code is: [email or web address]  Expires: 7/16/2018  9:10 AM     Your access code will "  in 90 days. If you need help or a new code, please call your [facility] clinic or [phone].        Care EveryWhere ID     This is your Care EveryWhere ID. This could be used by other organizations to access your [facility] medical records  [id number]        Your Vitals Were     Pulse Temperature Respirations Height BMI (Body Mass Index)       73 98.4  F (36.9  C) (Oral) 16 6' (1.829 m) 41.15 kg/m2        Blood Pressure from Last 3 Encounters:   18 123/82   17 130/80   17 135/80    Weight from Last 3 Encounters:   18 303 lb 6.4 oz (137.6 kg)   17 (!) 330 lb (149.7 kg)   17 (!) 331 lb (150.1 kg)              We Performed the Following     Basic metabolic panel     DIABETES EDUCATOR REFERRAL     HEMOGLOBIN A1C     Lipid panel reflex to direct LDL Fasting          Today's Medication Changes          These changes are accurate as of 18  9:10 AM.  If you have any questions, ask your nurse or doctor.               Start taking these medicines.        Dose/Directions    insulin degludec 200 UNIT/ML pen   Commonly known as:  TRESIBA   Used for:  Uncontrolled type 2 diabetes mellitus without complication, without long-term current use of insulin (H)   Started by:  Triston Hills MD        Dose:  20 Units   Inject 20 Units Subcutaneous daily   Quantity:  9 mL   Refills:  0         These medicines have changed or have updated prescriptions.        Dose/Directions    atorvastatin 40 MG tablet   Commonly known as:  LIPITOR   This may have changed:  See the new instructions.   Used for:  Uncontrolled type 2 diabetes mellitus without complication, without long-term current use of insulin (H)   Changed by:  Triston Hills MD        Dose:  40 mg   Take 1 tablet (40 mg) by mouth daily   Quantity:  90 tablet   Refills:  3            Where to get your medicines      These medications were sent to [facility] [address] [place] [address] [address] [facility]  [address] [place] [address],  [place] [address]     Phone:  [phone]     atorvastatin 40 MG tablet    insulin degludec 200 UNIT/ML pen    lisinopril 2.5 MG tablet    metFORMIN 500 MG 24 hr tablet                Primary Care Provider Office Phone # Fax #    Triston Hills [phone] [address] [facility] [address]        Equal Access to Services     [facility]: Hadii aad ku hadasho Soomaali, waaxda luqadaha, qaybta kaalmada adeegyada, waxay idiin hayaan adeeg kharash la'aan ah. So [facility] [phone].    ATENCIÓN: Si habla español, tiene a hall disposición servicios gratuitos de asistencia lingüística. Llame al [phone].    We comply with applicable federal civil rights laws and [place] laws. We do not discriminate on the basis of race, color, national origin, age, disability, sex, sexual orientation, or gender identity.            Thank you!     Thank you for choosing [facility]  for your care. Our goal is always to provide you with excellent care. Hearing back from our patients is one way we can continue to improve our services. Please take a few minutes to complete the written survey that you may receive in the mail after your visit with us. Thank you!             Your Updated Medication List - Protect others around you: Learn how to safely use, store and throw away your medicines at www.disposemymeds.org.          This list is accurate as of 4/17/18  9:10 AM.  Always use your most recent med list.                   Brand Name Dispense Instructions for use Diagnosis    aspirin 325 MG EC tablet     90 tablet    Take 1 tablet (325 mg) by mouth daily    Uncontrolled diabetes mellitus type 2 without complications (H)       atorvastatin 40 MG tablet    LIPITOR    90 tablet    Take 1 tablet (40 mg) by mouth daily    Uncontrolled type 2 diabetes mellitus without complication, without long-term current use of insulin (H)       Carboxymeth-Glycerin-Polysorb 0.5-1-0.5 % Soln    REFRESH OPTIVE  ADVANCED     Apply 1 drop to eye 2 times daily    Punctate keratitis, bilateral       insulin degludec 200 UNIT/ML pen    TRESIBA    9 mL    Inject 20 Units Subcutaneous daily    Uncontrolled type 2 diabetes mellitus without complication, without long-term current use of insulin (H)       lisinopril 2.5 MG tablet    PRINIVIL/Zestril    2 tablet    Take 1 tablet (2.5 mg) by mouth daily    Uncontrolled type 2 diabetes mellitus without complication, without long-term current use of insulin (H)       metFORMIN 500 MG 24 hr tablet    GLUCOPHAGE-XR    120 tablet    Take 2 tablets (1,000 mg) by mouth 2 times daily (with meals) Due for labs and office visit for refills.    Uncontrolled type 2 diabetes mellitus without complication, without long-term current use of insulin (H)

## 2018-04-18 RX ORDER — LISINOPRIL 2.5 MG/1
2.5 TABLET ORAL DAILY
Qty: 90 TABLET | Refills: 0 | Status: SHIPPED | OUTPATIENT
Start: 2018-04-18 | End: 2018-07-15

## 2018-04-27 ENCOUNTER — ALLIED HEALTH/NURSE VISIT (OUTPATIENT)
Dept: EDUCATION SERVICES | Facility: CLINIC | Age: 43
End: 2018-04-27
Payer: COMMERCIAL

## 2018-04-27 DIAGNOSIS — E11.9 DIABETES MELLITUS WITHOUT COMPLICATION (H): ICD-10-CM

## 2018-04-27 PROCEDURE — G0108 DIAB MANAGE TRN  PER INDIV: HCPCS

## 2018-04-27 PROCEDURE — 99207 ZZC DROP WITH A PROCEDURE: CPT

## 2018-04-27 NOTE — MR AVS SNAPSHOT
After Visit Summary   4/27/2018    Norberto Doty    MRN: [id number]           Patient Information     Date Of Birth          1975        Visit Information        Provider Department      4/27/2018 8:30 AM AN DIABETIC ED RESOURCE [facility]        Today's Diagnoses     Uncontrolled type 2 diabetes mellitus without complication, without long-term current use of insulin (H)    -  1      Care Instructions    My Diabetes Care Goals:    Healthy Eating: I will have a fruit or granola bar on the way to my breakfast place.  Be more aware of the portions.  More fruits and fresh veggies.  More water.      Being Active: I will try to walk more each day, park far away to the store.      Monitoring: I will check my BG when I wake up and 2 hrs after one of my meals.      Taking Medication: I will take Metformin  mg take 2 pills twice per day  Toujeo U300 take 25 units today and after 7 days, if the morning BG is still > 150 increase by 2 units.  Then after 7 days if the morning BG is still > 150 increase by 2 units.  Then call me to give me an update on your BG.  Trulicity 0.75 mg weekly.      Follow up:  Follow-up diabetes education appointment scheduled on Tuesday May 22 @ 8:30.      Bring blood glucose meter and logbook with you to all doctor and follow-up appointments.     [facility] for the [facility] Area:  For Your Diabetes Education and Nutrition Appointments Call:  [phone]   For Diabetes Education or Nutrition Related Questions:   Phone: [phone]  E-mail: [email or web address]  Fax: [phone]   If you need a medication refill please contact your pharmacy. Please allow 3 business days for your refills to be completed.    Instructions for emailing the Diabetes Educators    If you need to communicate a non-urgent message to a Diabetes Educator via email, please send to [email or web address].    Please follow the  "following email guidelines:    Subject line: Secure: your clinic name (example: Secure: [facility])  In the email please include: First name, middle initial, last name and date of birth.    We will be in touch with you within one (1) business day.             Follow-ups after your visit        Your next 10 appointments already scheduled     May 22, 2018  8:30 AM CDT   Diabetic Education with AN DIABETIC ED RESOURCE   [facility] ([facility])    [address] [facility] [address] [phone]              Who to contact     If you have questions or need follow up information about today's clinic visit or your schedule please contact [facility] directly at [phone].  Normal or non-critical lab and imaging results will be communicated to you by MyChart, letter or phone within 4 business days after the clinic has received the results. If you do not hear from us within 7 days, please contact the clinic through MyChart or phone. If you have a critical or abnormal lab result, we will notify you by phone as soon as possible.  Submit refill requests through SmartNews or call your pharmacy and they will forward the refill request to us. Please allow 3 business days for your refill to be completed.          Additional Information About Your Visit        SmartNews Information     SmartNews lets you send messages to your doctor, view your test results, renew your prescriptions, schedule appointments and more. To sign up, go to [email or web address] . Click on \"Log in\" on the left side of the screen, which will take you to the Welcome page. Then click on \"Sign up Now\" on the right side of the page.     You will be asked to enter the access code listed below, as well as some personal information. Please follow the directions to create your username and password.     Your access code is: [email or web address]  Expires: 2018  9:10 AM     Your access code will  " in 90 days. If you need help or a new code, please call your [facility] clinic or [phone].        Care EveryWhere ID     This is your Care EveryWhere ID. This could be used by other organizations to access your [facility] medical records  [id number]         Blood Pressure from Last 3 Encounters:   04/17/18 123/82   08/07/17 130/80   07/31/17 135/80    Weight from Last 3 Encounters:   04/17/18 137.6 kg (303 lb 6.4 oz)   08/07/17 (!) 149.7 kg (330 lb)   07/31/17 (!) 150.1 kg (331 lb)              Today, you had the following     No orders found for display         Today's Medication Changes          These changes are accurate as of 4/27/18  9:39 AM.  If you have any questions, ask your nurse or doctor.               Start taking these medicines.        Dose/Directions    dulaglutide 0.75 MG/0.5ML pen   Commonly known as:  TRULICITY   Used for:  Uncontrolled type 2 diabetes mellitus without complication, without long-term current use of insulin (H)        Dose:  0.75 mg   Inject 0.75 mg Subcutaneous every 7 days   Quantity:  2 mL   Refills:  1       insulin glargine U-300 300 UNIT/ML injection   Commonly known as:  TOUJEO   Used for:  Uncontrolled type 2 diabetes mellitus without complication, without long-term current use of insulin (H)        Dose:  25 Units   Inject 25 Units Subcutaneous At Bedtime   Quantity:  13.5 mL   Refills:  1       insulin pen needle 32G X 4 MM   Commonly known as:  BD RAMILA U/F   Used for:  Uncontrolled type 2 diabetes mellitus without complication, without long-term current use of insulin (H)        Use 1 daily as directed.   Quantity:  100 each   Refills:  1            Where to get your medicines      These medications were sent to [facility] [place] - [address]  [address] [facility] [address]     Phone:  [phone]     dulaglutide 0.75 MG/0.5ML pen    insulin glargine U-300 300 UNIT/ML injection    insulin pen needle 32G X 4 MM                Primary Care  Provider Office Phone # Fax #    [place] [phone] [address] [facility] [address]        Equal Access to Services     BINU PELAYO : Hadbibi kimberlyn alexander concepciono Sopradeep, waaxda luqadaha, qaybta kaalmada rui, magy berry chaoflorin wells laTejakaci weston. [facility] [phone].    ATENCIÓN: Si habla español, tiene a hall disposición servicios gratuitos de asistencia lingüística. Llame al [phone].    We comply with applicable federal civil rights laws and [place] laws. We do not discriminate on the basis of race, color, national origin, age, disability, sex, sexual orientation, or gender identity.            Thank you!     Thank you for choosing [facility]  for your care. Our goal is always to provide you with excellent care. Hearing back from our patients is one way we can continue to improve our services. Please take a few minutes to complete the written survey that you may receive in the mail after your visit with us. Thank you!             Your Updated Medication List - Protect others around you: Learn how to safely use, store and throw away your medicines at www.disposemymeds.org.          This list is accurate as of 4/27/18  9:39 AM.  Always use your most recent med list.                   Brand Name Dispense Instructions for use Diagnosis    aspirin 325 MG EC tablet     90 tablet    Take 1 tablet (325 mg) by mouth daily    Uncontrolled diabetes mellitus type 2 without complications (H)       atorvastatin 40 MG tablet    LIPITOR    90 tablet    Take 1 tablet (40 mg) by mouth daily    Uncontrolled type 2 diabetes mellitus without complication, without long-term current use of insulin (H)       Carboxymeth-Glycerin-Polysorb 0.5-1-0.5 % Soln    REFRESH OPTIVE ADVANCED     Apply 1 drop to eye 2 times daily    Punctate keratitis, bilateral       dulaglutide 0.75 MG/0.5ML pen    TRULICITY    2 mL    Inject 0.75 mg Subcutaneous every 7 days     Uncontrolled type 2 diabetes mellitus without complication, without long-term current use of insulin (H)       insulin degludec 200 UNIT/ML pen    TRESIBA    9 mL    Inject 20 Units Subcutaneous daily    Uncontrolled type 2 diabetes mellitus without complication, without long-term current use of insulin (H)       insulin glargine U-300 300 UNIT/ML injection    TOUJEO    13.5 mL    Inject 25 Units Subcutaneous At Bedtime    Uncontrolled type 2 diabetes mellitus without complication, without long-term current use of insulin (H)       insulin pen needle 32G X 4 MM    BD RAMILA U/F    100 each    Use 1 daily as directed.    Uncontrolled type 2 diabetes mellitus without complication, without long-term current use of insulin (H)       lisinopril 2.5 MG tablet    PRINIVIL/Zestril    90 tablet    Take 1 tablet (2.5 mg) by mouth daily    Uncontrolled type 2 diabetes mellitus without complication, without long-term current use of insulin (H)       metFORMIN 500 MG 24 hr tablet    GLUCOPHAGE-XR    120 tablet    Take 2 tablets (1,000 mg) by mouth 2 times daily (with meals) Due for labs and office visit for refills.    Uncontrolled type 2 diabetes mellitus without complication, without long-term current use of insulin (H)

## 2018-04-27 NOTE — PATIENT INSTRUCTIONS
My Diabetes Care Goals:    Healthy Eating: I will have a fruit or granola bar on the way to my breakfast place.  Be more aware of the portions.  More fruits and fresh veggies.  More water.      Being Active: I will try to walk more each day, park far away to the store.      Monitoring: I will check my BG when I wake up and 2 hrs after one of my meals.      Taking Medication: I will take Metformin  mg take 2 pills twice per day  Toujeo U300 take 25 units today and after 7 days, if the morning BG is still > 150 increase by 2 units.  Then after 7 days if the morning BG is still > 150 increase by 2 units.  Then call me to give me an update on your BG.  Trulicity 0.75 mg weekly.      Follow up:  Follow-up diabetes education appointment scheduled on Tuesday May 22 @ 8:30.      Bring blood glucose meter and logbook with you to all doctor and follow-up appointments.     [facility] for the [facility] Area:  For Your Diabetes Education and Nutrition Appointments Call:  [phone]   For Diabetes Education or Nutrition Related Questions:   Phone: [phone]  E-mail: [email or web address]  Fax: [phone]   If you need a medication refill please contact your pharmacy. Please allow 3 business days for your refills to be completed.    Instructions for emailing the Diabetes Educators    If you need to communicate a non-urgent message to a Diabetes Educator via email, please send to [email or web address].    Please follow the following email guidelines:    Subject line: Secure: your clinic name (example: Secure: [facility])  In the email please include: First name, middle initial, last name and date of birth.    We will be in touch with you within one (1) business day.

## 2018-04-27 NOTE — PROGRESS NOTES
Diabetes Self Management Training: Follow-up Visit    Norberto Doty presents today for education, evaluation of glucose control and modification of medication(s) related to Type 2 diabetes.    He is accompanied by self    Patient's diabetes management related comments/concerns: diabetes is out of control.     Patient would like this visit to be focused around the following diabetes-related behaviors and goals: Healthy Eating, Being Active, Monitoring and Taking Medication    ASSESSMENT:  Patient Problem List reviewed for relevant medical history and current medical status.    Current Diabetes Management per Patient:  Taking diabetes medications?   yes:     Diabetes Medication(s)     Biguanides Sig    metFORMIN (GLUCOPHAGE-XR) 500 MG 24 hr tablet Take 2 tablets (1,000 mg) by mouth 2 times daily (with meals) Due for labs and office visit for refills.    Insulin Sig    insulin degludec (TRESIBA) 200 UNIT/ML pen Inject 20 Units Subcutaneous daily      , Oral Medications: Metformin - Dose:  mg takes 1000 mg , Time: breakfast and supper, Injectable Medications: Tresiba unable to get .      *Abbreviated insulin dose documentation key: Insulin Trade Name (kjxabhkqw-wvvyg-geuswg-bedtime) - i.e. Humalog 5-5-5-0 (Humalog 5 units at breakfast, 5 units at lunch, and 5 units at dinner).    Patient glucose self monitoring as follows: one time daily.   BG meter: Akray meter  BG results: not available 272 this am.      BG values are: unable to assess  Patient's most recent   Lab Results   Component Value Date    A1C 12.0 04/17/2018    is not meeting goal of <7.0    Nutrition:  Patient eats 3 meals per day    Breakfast - gets up @ 6:00 Sausage Muffin, daley egg biscuit and iced coffee.or steak croissant with egg and sausage burrito   Snack:  PBJ uncrustables,   Lunch - @ 12-1:00 subway combo with soup and veggies or chicken quesadilla pizza and fried shrimp  Snack:  Saltine crackers with PB  Or beef sticks and cheese sticks    Dinner - @ Grilled steak and baby reds and veggies  stoffers chicken veggie and rice bake meals  Snacks - small bowl of cereal snack     Beverages: water, diet pop and flavored water.      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      Diabetes Complications:  Not discussed today.    Vitals:  There were no vitals taken for this visit.  Estimated body mass index is 41.15 kg/(m^2) as calculated from the following:    Height as of 4/17/18: 1.829 m (6').    Weight as of 4/17/18: 137.6 kg (303 lb 6.4 oz).   Last 3 BP:   BP Readings from Last 3 Encounters:   04/17/18 123/82   08/07/17 130/80   07/31/17 135/80       History   Smoking Status     Never Smoker   Smokeless Tobacco     Never Used       Labs:  Lab Results   Component Value Date    A1C 12.0 04/17/2018     Lab Results   Component Value Date     04/21/2016     Lab Results   Component Value Date    LDL 78 08/07/2017     HDL Cholesterol   Date Value Ref Range Status   08/07/2017 48 >39 mg/dL Final   ]  GFR Estimate   Date Value Ref Range Status   07/31/2017 >90  Non  GFR Calc   >60 mL/min/1.7m2 Final     GFR Estimate If Black   Date Value Ref Range Status   07/31/2017 >90   GFR Calc   >60 mL/min/1.7m2 Final     Lab Results   Component Value Date    CR 0.90 07/31/2017     No results found for: MICROALBUMIN    Health Beliefs and Attitudes:   Patient Activation Measure Survey Score:  MELANI Score (Last Two) 4/21/2016   MELANI Raw Score 52   Activation Score 100   MELANI Level 4       Stage of Change: relapse to changes    Progress toward meeting diabetes-related behavioral goals:    GOALS % Met Goal   Healthy Eating 50   Physical Activity 25   Monitoring 25   Medication Taking 100   Problem Solving     Healthy Coping     Risk Reduction           Diabetes knowledge and skills assessment:      Patient is knowledgeable in diabetes management concepts related to: Healthy Eating, Being Active, Monitoring and Taking Medication    Patient needs further education on the following diabetes management concepts: Healthy Eating, Being Active, Monitoring and Taking Medication    Barriers to Learning Assessment: No Barriers identified    Based on learning assessment above, most appropriate setting for further diabetes education would be: Individual setting.    INTERVENTION:    Education provided today on:  AADE Self-Care Behaviors:  Healthy Eating: carbohydrate counting, consistency in amount, composition, and timing of food intake, weight reduction, heart healthy diet, eating out, portion control, plate planning method and label reading  Being Active: relationship to blood glucose and describe appropriate activity program  Monitoring: purpose, proper technique, log and interpret results, individual blood glucose targets and frequency of monitoring  Taking Medication: action of prescribed medication, drawing up, administering and storing injectable diabetes medications, proper site selection and rotation for injections, side effects of prescribed medications and when to take medications    Opportunities for ongoing education and support in diabetes-self management were discussed.    Pt verbalized understanding of concepts discussed and recommendations provided today.       Education Materials Provided:  [facility] Understanding Diabetes Booklet, Safe Disposal Options for Needles & Syringes and BG Log Sheet, carb counting     PLAN:  See Patient Instructions for co-developed, patient-stated behavior change goals.  AVS printed and provided to patient today.    FOLLOW-UP:  Follow-up appointment scheduled on may 22.  Follow-up with PCP recommended.  Chart routed to referring provider.    Ongoing plan for education and support: Follow-up visit with diabetes educator in [place]    Mckenna Suero RN/SHERIDANE  [facility] Diabetes  Educator    Time Spent: 60 minutes  Encounter Type: Individual    Any diabetes medication dose changes were made via the CDE Protocol and Collaborative Practice Agreement with the patient's primary care provider. A copy of this encounter was shared with the provider.

## 2018-04-27 NOTE — LETTER
4/27/2018         RE: Norberto Doty  [address]  [place] [address]        Dear Dr. [place],    Norberto came today with his fiance and this was an eye opener for both when I did mention what could happen if he did not make changes.  Tresiba was not covered for him now, gave him a voucher for Toujeo and will try to see if we can get him on a GLP-1 voucher for Trulicity. He is willing to try this.     Working with him and hoping he sees how serious this is.  Thank you for referring your patient, Norberto Doty, to the [facility]. Please see a copy of my visit note below.    Diabetes Self Management Training: Follow-up Visit    Norberto Doty presents today for education, evaluation of glucose control and modification of medication(s) related to Type 2 diabetes.    He is accompanied by self    Patient's diabetes management related comments/concerns: diabetes is out of control.     Patient would like this visit to be focused around the following diabetes-related behaviors and goals: Healthy Eating, Being Active, Monitoring and Taking Medication    ASSESSMENT:  Patient Problem List reviewed for relevant medical history and current medical status.    Current Diabetes Management per Patient:  Taking diabetes medications?   yes:     Diabetes Medication(s)     Biguanides Sig    metFORMIN (GLUCOPHAGE-XR) 500 MG 24 hr tablet Take 2 tablets (1,000 mg) by mouth 2 times daily (with meals) Due for labs and office visit for refills.    Insulin Sig    insulin degludec (TRESIBA) 200 UNIT/ML pen Inject 20 Units Subcutaneous daily      , Oral Medications: Metformin - Dose:  mg takes 1000 mg , Time: breakfast and supper, Injectable Medications: Tresiba unable to get .      *Abbreviated insulin dose documentation key: Insulin Trade Name (derlevwol-ydedx-fhtjvu-bedtime) - i.e. Humalog 5-5-5-0 (Humalog 5 units at breakfast, 5 units at lunch, and 5 units at dinner).    Patient glucose self  monitoring as follows: one time daily.   BG meter: Akray meter  BG results: not available 272 this am.      BG values are: unable to assess  Patient's most recent   Lab Results   Component Value Date    A1C 12.0 04/17/2018    is not meeting goal of <7.0    Nutrition:  Patient eats 3 meals per day    Breakfast - gets up @ 6:00 Sausage Muffin, daley egg biscuit and iced coffee.or steak croissant with egg and sausage burrito   Snack:  PBJ uncrustables,   Lunch - @ 12-1:00 subway combo with soup and veggies or chicken quesadilla pizza and fried shrimp  Snack:  Saltine crackers with PB  Or beef sticks and cheese sticks   Dinner - @ Grilled steak and baby reds and veggies  stoffers chicken veggie and rice bake meals  Snacks - small bowl of cereal snack     Beverages: water, diet pop and flavored water.      Cultural/Caodaism diet restrictions: No     Biggest Challenge to Healthy Eating: knowing what to eat    Physical Activity:    Type: tries to get to truck stops on the road to walk around.      Diabetes Complications:  Not discussed today.    Vitals:  There were no vitals taken for this visit.  Estimated body mass index is 41.15 kg/(m^2) as calculated from the following:    Height as of 4/17/18: 1.829 m (6').    Weight as of 4/17/18: 137.6 kg (303 lb 6.4 oz).   Last 3 BP:   BP Readings from Last 3 Encounters:   04/17/18 123/82   08/07/17 130/80   07/31/17 135/80       History   Smoking Status     Never Smoker   Smokeless Tobacco     Never Used       Labs:  Lab Results   Component Value Date    A1C 12.0 04/17/2018     Lab Results   Component Value Date     04/21/2016     Lab Results   Component Value Date    LDL 78 08/07/2017     HDL Cholesterol   Date Value Ref Range Status   08/07/2017 48 >39 mg/dL Final   ]  GFR Estimate   Date Value Ref Range Status   07/31/2017 >90  Non  GFR Calc   >60 mL/min/1.7m2 Final     GFR Estimate If Black   Date Value Ref Range Status   07/31/2017 >90    American GFR Calc   >60 mL/min/1.7m2 Final     Lab Results   Component Value Date    CR 0.90 07/31/2017     No results found for: MICROALBUMIN    Health Beliefs and Attitudes:   Patient Activation Measure Survey Score:  MELANI Score (Last Two) 4/21/2016   MELANI Raw Score 52   Activation Score 100   MELANI Level 4       Stage of Change: relapse to changes    Progress toward meeting diabetes-related behavioral goals:    GOALS % Met Goal   Healthy Eating 50   Physical Activity 25   Monitoring 25   Medication Taking 100   Problem Solving     Healthy Coping     Risk Reduction           Diabetes knowledge and skills assessment:     Patient is knowledgeable in diabetes management concepts related to: Healthy Eating, Being Active, Monitoring and Taking Medication    Patient needs further education on the following diabetes management concepts: Healthy Eating, Being Active, Monitoring and Taking Medication    Barriers to Learning Assessment: No Barriers identified    Based on learning assessment above, most appropriate setting for further diabetes education would be: Individual setting.    INTERVENTION:    Education provided today on:  AADE Self-Care Behaviors:  Healthy Eating: carbohydrate counting, consistency in amount, composition, and timing of food intake, weight reduction, heart healthy diet, eating out, portion control, plate planning method and label reading  Being Active: relationship to blood glucose and describe appropriate activity program  Monitoring: purpose, proper technique, log and interpret results, individual blood glucose targets and frequency of monitoring  Taking Medication: action of prescribed medication, drawing up, administering and storing injectable diabetes medications, proper site selection and rotation for injections, side effects of prescribed medications and when to take medications    Opportunities for ongoing education and support in diabetes-self management were discussed.    Pt verbalized  understanding of concepts discussed and recommendations provided today.       Education Materials Provided:  [facility] Understanding Diabetes Booklet, Safe Disposal Options for Needles & Syringes and BG Log Sheet, carb counting     PLAN:  See Patient Instructions for co-developed, patient-stated behavior change goals.  AVS printed and provided to patient today.    FOLLOW-UP:  Follow-up appointment scheduled on may 22.  Follow-up with PCP recommended.  Chart routed to referring provider.    Ongoing plan for education and support: Follow-up visit with diabetes educator in [place]    Mckenna Suero RN/WALDO Sharma Diabetes Educator    Time Spent: 60 minutes  Encounter Type: Individual    Any diabetes medication dose changes were made via the CDE Protocol and Collaborative Practice Agreement with the patient's primary care provider. A copy of this encounter was shared with the provider.

## 2018-05-15 RX ORDER — METFORMIN HCL 500 MG
1000 TABLET, EXTENDED RELEASE 24 HR ORAL 2 TIMES DAILY WITH MEALS
Qty: 360 TABLET | Refills: 0 | Status: SHIPPED | OUTPATIENT
Start: 2018-05-15 | End: 2018-07-24

## 2018-05-22 ENCOUNTER — ALLIED HEALTH/NURSE VISIT (OUTPATIENT)
Dept: EDUCATION SERVICES | Facility: CLINIC | Age: 43
End: 2018-05-22
Payer: COMMERCIAL

## 2018-05-22 ENCOUNTER — OFFICE VISIT (OUTPATIENT)
Dept: FAMILY MEDICINE | Facility: CLINIC | Age: 43
End: 2018-05-22
Payer: COMMERCIAL

## 2018-05-22 VITALS
HEART RATE: 75 BPM | OXYGEN SATURATION: 97 % | DIASTOLIC BLOOD PRESSURE: 81 MMHG | BODY MASS INDEX: 41.04 KG/M2 | HEIGHT: 72 IN | RESPIRATION RATE: 18 BRPM | TEMPERATURE: 97 F | WEIGHT: 303 LBS | SYSTOLIC BLOOD PRESSURE: 124 MMHG

## 2018-05-22 DIAGNOSIS — E11.9 DIABETES MELLITUS WITHOUT COMPLICATION (H): Primary | ICD-10-CM

## 2018-05-22 PROCEDURE — 99207 ZZC DROP WITH A PROCEDURE: CPT

## 2018-05-22 PROCEDURE — 99214 OFFICE O/P EST MOD 30 MIN: CPT | Performed by: FAMILY MEDICINE

## 2018-05-22 PROCEDURE — G0108 DIAB MANAGE TRN  PER INDIV: HCPCS

## 2018-05-22 ASSESSMENT — PAIN SCALES - GENERAL: PAINLEVEL: NO PAIN (0)

## 2018-05-22 NOTE — MR AVS SNAPSHOT
After Visit Summary   5/22/2018    Norberto Doty    MRN: [id number]           Patient Information     Date Of Birth          1975        Visit Information        Provider Department      5/22/2018 9:30 AM Triston Hills MD [facility]        Today's Diagnoses     Uncontrolled type 2 diabetes mellitus without complication, without long-term current use of insulin (H)           Follow-ups after your visit        Your next 10 appointments already scheduled     Jul 24, 2018  8:00 AM CDT   LAB with AN LAB   [facility] ([facility]    [address] [facility] [address] [facility] [address]   [phone]           Please do not eat 10-12 hours before your appointment if you are coming in fasting for labs on lipids, cholesterol, or glucose (sugar). This does not apply to pregnant women. Water, hot tea and black coffee (with nothing added) are okay. Do not drink other fluids, diet soda or chew gum.            Jul 24, 2018  8:30 AM CDT   Diabetic Education with AN DIABETIC ED RESOURCE   [facility] ([facility])    [address] [facility] [address] [facility] [address]   [phone]              Future tests that were ordered for you today     Open Future Orders        Priority Expected Expires Ordered    Hemoglobin A1c Routine 7/20/2018 5/22/2019 5/22/2018            Who to contact     If you have questions or need follow up information about today's clinic visit or your schedule please contact [facility] directly at [phone].  Normal or non-critical lab and imaging results will be communicated to you by MyChart, letter or phone within 4 business days after the clinic has received the results. If you do not hear from us within 7 days, please contact the clinic through Your Practical Solutionshart or phone. If you have a critical or abnormal lab result, we will notify you by phone as soon as possible.  Submit refill requests through [employer] or  "call your pharmacy and they will forward the refill request to us. Please allow 3 business days for your refill to be completed.          Additional Information About Your Visit        MyChart Information     Preparishart lets you send messages to your doctor, view your test results, renew your prescriptions, schedule appointments and more. To sign up, go to [email or web address] . Click on \"Log in\" on the left side of the screen, which will take you to the Welcome page. Then click on \"Sign up Now\" on the right side of the page.     You will be asked to enter the access code listed below, as well as some personal information. Please follow the directions to create your username and password.     Your access code is: [email or web address]  Expires: 2018  9:10 AM     Your access code will  in 90 days. If you need help or a new code, please call your [place] clinic or [phone].        Care EveryWhere ID     This is your Care EveryWhere ID. This could be used by other organizations to access your [place] medical records  [id number]        Your Vitals Were     Pulse Temperature Respirations Height Pulse Oximetry BMI (Body Mass Index)    75 97  F (36.1  C) (Oral) 18 6' (1.829 m) 97% 41.09 kg/m2       Blood Pressure from Last 3 Encounters:   18 124/81   18 123/82   17 130/80    Weight from Last 3 Encounters:   18 303 lb (137.4 kg)   18 303 lb 6.4 oz (137.6 kg)   17 (!) 330 lb (149.7 kg)              Today, you had the following     No orders found for display         Today's Medication Changes          These changes are accurate as of 18  9:59 AM.  If you have any questions, ask your nurse or doctor.               These medicines have changed or have updated prescriptions.        Dose/Directions    * dulaglutide 0.75 MG/0.5ML pen   Commonly known as:  TRULICITY   This may have changed:  Another medication with the same name was added. Make sure you understand how and when to " take each.   Used for:  Uncontrolled type 2 diabetes mellitus without complication, without long-term current use of insulin (H)   Changed by:  Triston Hills MD        Dose:  0.75 mg   Inject 0.75 mg Subcutaneous every 7 days   Quantity:  2 mL   Refills:  1       * dulaglutide 0.75 MG/0.5ML pen   Commonly known as:  TRULICITY   This may have changed:  You were already taking a medication with the same name, and this prescription was added. Make sure you understand how and when to take each.   Used for:  Uncontrolled type 2 diabetes mellitus without complication, without long-term current use of insulin (H)   Changed by:  Triston Hills MD        Dose:  0.75 mg   Inject 0.75 mg Subcutaneous every 7 days   Quantity:  2 mL   Refills:  1       insulin glargine U-300 300 UNIT/ML injection   Commonly known as:  TOUJEO   This may have changed:  how much to take   Used for:  Uncontrolled type 2 diabetes mellitus without complication, without long-term current use of insulin (H)        Dose:  30 Units   Inject 30 Units Subcutaneous At Bedtime   Quantity:  13.5 mL   Refills:  1       * Notice:  This list has 2 medication(s) that are the same as other medications prescribed for you. Read the directions carefully, and ask your doctor or other care provider to review them with you.      Stop taking these medicines if you haven't already. Please contact your care team if you have questions.     insulin degludec 200 UNIT/ML pen   Commonly known as:  TRESIBA                Where to get your medicines      These medications were sent to [facility] [address] [place] [address] [address] [facility]  [address] [facility] [address]     Phone:  [phone]     dulaglutide 0.75 MG/0.5ML pen    insulin glargine U-300 300 UNIT/ML injection                Primary Care Provider Office Phone # Fax #    Triston Hills [phone] [address] [facility] [address]        Equal Access to Services      BINU PELAYO : Hadii aad ku xiomy Sopradeep, waaxda luqadaha, qaybta kaalmada adechris, magy lalitha sterlingmago becerrakristinado doyle . So [facility] [phone].    ATENCIÓN: Si habla español, tiene a hall disposición servicios gratuitos de asistencia lingüística. Llame al [phone].    We comply with applicable federal civil rights laws and [place] laws. We do not discriminate on the basis of race, color, national origin, age, disability, sex, sexual orientation, or gender identity.            Thank you!     Thank you for choosing [facility]] [facility]  [facility]. Our goal is always to provide you with excellent care. Hearing back from our patients is one way we can continue to improve our services. Please take a few minutes to complete the written survey that you may receive in the mail after your visit with us. Thank you!             Your Updated Medication List - Protect others around you: Learn how to safely use, store and throw away your medicines at www.disposemymeds.org.          This list is accurate as of 5/22/18  9:59 AM.  Always use your most recent med list.                   Brand Name Dispense Instructions for use Diagnosis    aspirin 325 MG EC tablet     90 tablet    Take 1 tablet (325 mg) by mouth daily    Uncontrolled diabetes mellitus type 2 without complications (H)       atorvastatin 40 MG tablet    LIPITOR    90 tablet    Take 1 tablet (40 mg) by mouth daily    Uncontrolled type 2 diabetes mellitus without complication, without long-term current use of insulin (H)       Carboxymeth-Glycerin-Polysorb 0.5-1-0.5 % Soln    REFRESH OPTIVE ADVANCED     Apply 1 drop to eye 2 times daily    Punctate keratitis, bilateral       * dulaglutide 0.75 MG/0.5ML pen    TRULICITY    2 mL    Inject 0.75 mg Subcutaneous every 7 days    Uncontrolled type 2 diabetes mellitus without complication, without long-term current use of insulin (H)       * dulaglutide 0.75 MG/0.5ML pen    TRULICITY    2 mL    Inject  0.75 mg Subcutaneous every 7 days    Uncontrolled type 2 diabetes mellitus without complication, without long-term current use of insulin (H)       insulin glargine U-300 300 UNIT/ML injection    TOUJEO    13.5 mL    Inject 30 Units Subcutaneous At Bedtime    Uncontrolled type 2 diabetes mellitus without complication, without long-term current use of insulin (H)       insulin pen needle 32G X 4 MM    BD RAMILA U/F    100 each    Use 1 daily as directed.    Uncontrolled type 2 diabetes mellitus without complication, without long-term current use of insulin (H)       lisinopril 2.5 MG tablet    PRINIVIL/Zestril    90 tablet    Take 1 tablet (2.5 mg) by mouth daily    Uncontrolled type 2 diabetes mellitus without complication, without long-term current use of insulin (H)       metFORMIN 500 MG 24 hr tablet    GLUCOPHAGE-XR    360 tablet    Take 2 tablets (1,000 mg) by mouth 2 times daily (with meals)    Uncontrolled type 2 diabetes mellitus without complication, without long-term current use of insulin (H)       * Notice:  This list has 2 medication(s) that are the same as other medications prescribed for you. Read the directions carefully, and ask your doctor or other care provider to review them with you.

## 2018-05-22 NOTE — MR AVS SNAPSHOT
After Visit Summary   5/22/2018    Norberto Doty    MRN: [id number]           Patient Information     Date Of Birth          1975        Visit Information        Provider Department      5/22/2018 8:30 AM AN DIABETIC ED RESOURCE [facility]        Today's Diagnoses     Uncontrolled type 2 diabetes mellitus without complication, without long-term current use of insulin (H)          Care Instructions    My Diabetes Care Goals:    Healthy Eating: I will drink more water, more fruit, the snacks if having late meals.  Would like to work on a 2 hrs apart with meals.  Cottage cheese with fruit.    Being Active: I will try to take a short walk for 5-10 minutes about every hr. When at the computer.  Will get my bike out and begin to bike.     Monitoring: I will work on the evening check, before dinner when getting home from work.      Taking Medication: I will take Toujeo U300 30 units daily.  Trulicity 0.75 mg daily  Metformin  mg take 2 pills twice daily with meals.      If you are having some low BG please call me, then I will lower.      Follow up:  Follow-up diabetes education appointment scheduled on Tuesday July 24 @8:30.      Bring blood glucose meter and logbook with you to all doctor and follow-up appointments.     [facility] for the [facility]:  For Your Diabetes Education and Nutrition Appointments Call:  [phone]   For Diabetes Education or Nutrition Related Questions:   Phone: [phone]  E-mail: [email or web address]  Fax: [phone]   If you need a medication refill please contact your pharmacy. Please allow 3 business days for your refills to be completed.    Instructions for emailing the Diabetes Educators    If you need to communicate a non-urgent message to a Diabetes Educator via email, please send to [email or web address].    Please follow the following email guidelines:    Subject line: Secure:  "your clinic name (example: Secure: [facility])  In the email please include: First name, middle initial, last name and date of birth.    We will be in touch with you within one (1) business day.             Follow-ups after your visit        Your next 10 appointments already scheduled     May 22, 2018  9:30 AM CDT   SHORT with Triston Hills MD   [facility] ([facility]    [address] [facility] [address] [phone]            Jul 24, 2018  8:30 AM CDT   Diabetic Education with AN DIABETIC ED RESOURCE   [facility] ([facility])    [address] [facility] [address] [phone]              Who to contact     If you have questions or need follow up information about today's clinic visit or your schedule please contact [facility] directly at [phone].  Normal or non-critical lab and imaging results will be communicated to you by MyChart, letter or phone within 4 business days after the clinic has received the results. If you do not hear from us within 7 days, please contact the clinic through Wiztangohart or phone. If you have a critical or abnormal lab result, we will notify you by phone as soon as possible.  Submit refill requests through Infrafone or call your pharmacy and they will forward the refill request to us. Please allow 3 business days for your refill to be completed.          Additional Information About Your Visit        Infrafone Information     Infrafone lets you send messages to your doctor, view your test results, renew your prescriptions, schedule appointments and more. To sign up, go to [email or web address] . Click on \"Log in\" on the left side of the screen, which will take you to the Welcome page. Then click on \"Sign up Now\" on the right side of the page.     You will be asked to enter the access code listed below, as well as some personal information. Please follow the directions " to create your username and password.     Your access code is: [email or web address]  Expires: 2018  9:10 AM     Your access code will  in 90 days. If you need help or a new code, please call your [place] clinic or [phone].        Care EveryWhere ID     This is your Care EveryWhere ID. This could be used by other organizations to access your [place] medical records  [id number]         Blood Pressure from Last 3 Encounters:   18 123/82   17 130/80   17 135/80    Weight from Last 3 Encounters:   18 137.6 kg (303 lb 6.4 oz)   17 (!) 149.7 kg (330 lb)   17 (!) 150.1 kg (331 lb)              Today, you had the following     No orders found for display         Today's Medication Changes          These changes are accurate as of 18  9:06 AM.  If you have any questions, ask your nurse or doctor.               Stop taking these medicines if you haven't already. Please contact your care team if you have questions.     insulin degludec 200 UNIT/ML pen   Commonly known as:  TRESIBA                    Primary Care Provider Office Phone # Fax #    Triston Hills [phone] [address] [facility] [address]        Equal Access to Services     [facility] GITA : Hadii kimberlyn alexander hadasho Sopradeep, waaxda luqadaha, qaybta kaalmamagy crawford. So [facility] [phone].    ATENCIÓN: Si habla español, tiene a hall disposición servicios gratuitos de asistencia lingüística. Llame al [phone].    We comply with applicable federal civil rights laws and [place] laws. We do not discriminate on the basis of race, color, national origin, age, disability, sex, sexual orientation, or gender identity.            Thank you!     Thank you for choosing [facility]  for your care. Our goal is always to provide you with excellent care. Hearing back from our patients is one way we can continue to improve our  services. Please take a few minutes to complete the written survey that you may receive in the mail after your visit with us. Thank you!             Your Updated Medication List - Protect others around you: Learn how to safely use, store and throw away your medicines at www.disposemymeds.org.          This list is accurate as of 5/22/18  9:06 AM.  Always use your most recent med list.                   Brand Name Dispense Instructions for use Diagnosis    aspirin 325 MG EC tablet     90 tablet    Take 1 tablet (325 mg) by mouth daily    Uncontrolled diabetes mellitus type 2 without complications (H)       atorvastatin 40 MG tablet    LIPITOR    90 tablet    Take 1 tablet (40 mg) by mouth daily    Uncontrolled type 2 diabetes mellitus without complication, without long-term current use of insulin (H)       Carboxymeth-Glycerin-Polysorb 0.5-1-0.5 % Soln    REFRESH OPTIVE ADVANCED     Apply 1 drop to eye 2 times daily    Punctate keratitis, bilateral       dulaglutide 0.75 MG/0.5ML pen    TRULICITY    2 mL    Inject 0.75 mg Subcutaneous every 7 days    Uncontrolled type 2 diabetes mellitus without complication, without long-term current use of insulin (H)       insulin glargine U-300 300 UNIT/ML injection    TOUJEO    13.5 mL    Inject 25 Units Subcutaneous At Bedtime    Uncontrolled type 2 diabetes mellitus without complication, without long-term current use of insulin (H)       insulin pen needle 32G X 4 MM    BD RAMILA U/F    100 each    Use 1 daily as directed.    Uncontrolled type 2 diabetes mellitus without complication, without long-term current use of insulin (H)       lisinopril 2.5 MG tablet    PRINIVIL/Zestril    90 tablet    Take 1 tablet (2.5 mg) by mouth daily    Uncontrolled type 2 diabetes mellitus without complication, without long-term current use of insulin (H)       metFORMIN 500 MG 24 hr tablet    GLUCOPHAGE-XR    360 tablet    Take 2 tablets (1,000 mg) by mouth 2 times daily (with meals)     Uncontrolled type 2 diabetes mellitus without complication, without long-term current use of insulin (H)

## 2018-05-22 NOTE — PATIENT INSTRUCTIONS
My Diabetes Care Goals:    Healthy Eating: I will drink more water, more fruit, the snacks if having late meals.  Would like to work on a 2 hrs apart with meals.  Cottage cheese with fruit.    Being Active: I will try to take a short walk for 5-10 minutes about every hr. When at the computer.  Will get my bike out and begin to bike.     Monitoring: I will work on the evening check, before dinner when getting home from work.      Taking Medication: I will take Toujeo U300 30 units daily.  Trulicity 0.75 mg daily  Metformin  mg take 2 pills twice daily with meals.      If you are having some low BG please call me, then I will lower.      Follow up:  Follow-up diabetes education appointment scheduled on Tuesday July 24 @8:30.      Bring blood glucose meter and logbook with you to all doctor and follow-up appointments.     [facility] for the [facility]:  For Your Diabetes Education and Nutrition Appointments Call:  [phone]   For Diabetes Education or Nutrition Related Questions:   Phone: [phone]  E-mail: [email or web address]  Fax: [phone]   If you need a medication refill please contact your pharmacy. Please allow 3 business days for your refills to be completed.    Instructions for emailing the Diabetes Educators    If you need to communicate a non-urgent message to a Diabetes Educator via email, please send to [email or web address].    Please follow the following email guidelines:    Subject line: Secure: your clinic name (example: Secure: [facility])  In the email please include: First name, middle initial, last name and date of birth.    We will be in touch with you within one (1) business day.

## 2018-05-22 NOTE — PROGRESS NOTES
Diabetes Self Management Training: Follow-up Visit    Norberto Doty presents today for education, evaluation of glucose control and modification of medication(s) related to Type 2 diabetes.    He is accompanied by self    Patient's diabetes management related comments/concerns: stressed , getting  June 9,     Patient would like this visit to be focused around the following diabetes-related behaviors and goals: Healthy Eating, Being Active, Monitoring and Taking Medication    ASSESSMENT:  Patient Problem List reviewed for relevant medical history and current medical status.    Current Diabetes Management per Patient:  Taking diabetes medications?   yes:     Diabetes Medication(s)     Biguanides Sig    metFORMIN (GLUCOPHAGE-XR) 500 MG 24 hr tablet Take 2 tablets (1,000 mg) by mouth 2 times daily (with meals)    Insulin Sig    insulin degludec (TRESIBA) 200 UNIT/ML pen Inject 20 Units Subcutaneous daily    insulin glargine U-300 (TOUJEO) 300 UNIT/ML injection Inject 25 Units Subcutaneous At Bedtime    Incretin Mimetic Agents (GLP-1 Receptor Agonists) Sig    dulaglutide (TRULICITY) 0.75 MG/0.5ML pen Inject 0.75 mg Subcutaneous every 7 days      , Oral Medications: Metformin - Dose:  mg takes 1000 mg , Time: breakfast and supper, Injectable Medications: Toujeo 25-0-0-0 and Trulicity 0.75mg once weekly    *Abbreviated insulin dose documentation key: Insulin Trade Name (xdknodzie-nzwir-bukdux-bedtime) - i.e. Humalog 5-5-5-0 (Humalog 5 units at breakfast, 5 units at lunch, and 5 units at dinner).    Patient glucose self monitoring as follows: two times daily.   BG meter: Akray meter  BG results: see blood glucose log attached to this encounter       BG values are: Not in goal, improving  Patient's most recent   Lab Results   Component Value Date    A1C 12.0 04/17/2018    is not meeting goal of <7.0    Nutrition:  Patient eats 3 meals per day    Breakfast - banana and breakfast sandwich and belvita. @  7:00  Snack:  belvita , uncrustables  Lunch - @ 12-1:00 [employer], soup and apples , may have [employer], 1/4 lb. With cheese and yogurt parfait.    Snack:  Ritz crackers, whole grain, with cheese.   Dinner - @ 6:00 meat, veggies, some left overs, steamables with potatoes, and veggies and salad  Snacks - popcorn.  May have a pear.      Beverages: during the day, iced coffee, diet pop and switching out with water.  Having ice water.      Cultural/Jewish diet restrictions: No     Biggest Challenge to Healthy Eating: to get in the meals, especially in the summer.      Physical Activity:    Type: this is one area to work on , due to the wedding coming up.  Has been parking further in the lot.      Diabetes Complications:  Not discussed today.    Vitals:  There were no vitals taken for this visit.  Estimated body mass index is 41.15 kg/(m^2) as calculated from the following:    Height as of 4/17/18: 1.829 m (6').    Weight as of 4/17/18: 137.6 kg (303 lb 6.4 oz).   Last 3 BP:   BP Readings from Last 3 Encounters:   04/17/18 123/82   08/07/17 130/80   07/31/17 135/80       History   Smoking Status     Never Smoker   Smokeless Tobacco     Never Used       Labs:  Lab Results   Component Value Date    A1C 12.0 04/17/2018     Lab Results   Component Value Date     04/21/2016     Lab Results   Component Value Date    LDL 78 08/07/2017     HDL Cholesterol   Date Value Ref Range Status   08/07/2017 48 >39 mg/dL Final   ]  GFR Estimate   Date Value Ref Range Status   07/31/2017 >90  Non  GFR Calc   >60 mL/min/1.7m2 Final     GFR Estimate If Black   Date Value Ref Range Status   07/31/2017 >90   GFR Calc   >60 mL/min/1.7m2 Final     Lab Results   Component Value Date    CR 0.90 07/31/2017     No results found for: MICROALBUMIN    Health Beliefs and Attitudes:   Patient Activation Measure Survey Score:  MELANI Score (Last Two) 4/21/2016   MELANI Raw Score 52   Activation Score 100   MELANI Level 4        Stage of Change: ACTION (Actively working towards change)    Progress toward meeting diabetes-related behavioral goals:    GOALS % Met Goal   Healthy Eating  75   Physical Activity  25   Monitoring  75   Medication Taking  100   Problem Solving     Healthy Coping     Risk Reduction           Diabetes knowledge and skills assessment:     Patient is knowledgeable in diabetes management concepts related to: Healthy Eating, Being Active, Monitoring and Taking Medication    Patient needs further education on the following diabetes management concepts: Healthy Eating, Being Active, Monitoring and Taking Medication    Barriers to Learning Assessment: No Barriers identified    Based on learning assessment above, most appropriate setting for further diabetes education would be: Individual setting.    INTERVENTION:    Education provided today on:  AADE Self-Care Behaviors:  Healthy Eating: carbohydrate counting, consistency in amount, composition, and timing of food intake, heart healthy diet, eating out and portion control  Being Active: relationship to blood glucose and describe appropriate activity program  Monitoring: purpose, proper technique, log and interpret results, individual blood glucose targets and frequency of monitoring  Taking Medication: action of prescribed medication, drawing up, administering and storing injectable diabetes medications, proper site selection and rotation for injections, side effects of prescribed medications and when to take medications    Opportunities for ongoing education and support in diabetes-self management were discussed.    Pt verbalized understanding of concepts discussed and recommendations provided today.       Education Materials Provided:  No new materials provided today    PLAN:  See Patient Instructions for co-developed, patient-stated behavior change goals.  AVS printed and provided to patient today.    FOLLOW-UP:  Follow-up appointment scheduled on July 24.  Chart  routed to referring provider.    Ongoing plan for education and support: Follow-up visit with diabetes educator in Angelaal Suero RN/[facility] Diabetes Educator    Time Spent: 30 minutes  Encounter Type: Individual    Any diabetes medication dose changes were made via the CDE Protocol and Collaborative Practice Agreement with the patient's primary care provider. A copy of this encounter was shared with the provider.

## 2018-05-22 NOTE — NURSING NOTE
Chief Complaint   Patient presents with     Diabetes       Initial /81  Pulse 75  Temp 97  F (36.1  C) (Oral)  Resp 18  Ht 6' (1.829 m)  Wt 303 lb (137.4 kg)  SpO2 97%  BMI 41.09 kg/m2 Estimated body mass index is 41.09 kg/(m^2) as calculated from the following:    Height as of this encounter: 6' (1.829 m).    Weight as of this encounter: 303 lb (137.4 kg).  Medication Reconciliation: complete  Luly Gomez M.A.

## 2018-05-22 NOTE — PROGRESS NOTES
SUBJECTIVE:   Norberto Doty is a 42 year old male who presents to clinic today for the following health issues:        Diabetes     Diabetes:     Frequency of checking blood sugars::  2 times a day    Diabetic concerns::  None    Hypoglycemia symptoms::  None    Paraesthesia present::  No    Eye Exam in the last year::  NO  History of Present Illness     Diabetes:     Frequency of checking blood sugars::  2 times a day    Diabetic concerns::  None    Hypoglycemia symptoms::  None    Paraesthesia present::  No    Eye Exam in the last year::  NO    Diabetes Follow-up          BP Readings from Last 2 Encounters:   04/17/18 123/82   08/07/17 130/80     Hemoglobin A1C (%)   Date Value   04/17/2018 12.0 (H)   07/31/2017 9.4 (H)     LDL Cholesterol Calculated (mg/dL)   Date Value   08/07/2017 78   04/21/2016 113 (H)     SUBJECTIVE:  Norberto Doty presents today for follow up of DIABETES MELLITUS .         Patient glucose self monitoring: two times daily, once daily.  Blood glucose averages: 130-150  Symptoms of low blood sugar (hypoglycemia)? n  Problems taking medications regularly? y   Side effects? diarrhea  What are you doing for exercise outside of work or your daily activities? work  Reviewed health maintenance  Patient Active Problem List   Diagnosis     Punctate keratitis, bilateral     Uncontrolled diabetes mellitus type 2 without complications (H)     Morbid obesity due to excess calories (H)     Morbid obesity (H)       Smokes n  PHQ-2  Over the last two weeks- Have you been bothered by little interest or pleasure in doing things?  No  Over the last two weeks- Have you been been feeling down, depressed, or hopeless?  No    BP:   BP Readings from Last 3 Encounters:   05/22/18 124/81   04/17/18 123/82   08/07/17 130/80       Lab Results   Component Value Date    A1C 12.0 04/17/2018    A1C 9.4 07/31/2017    A1C 6.8 06/27/2016       Recent Labs   Lab Test  08/07/17   0809  04/21/16   0908   CHOL  166  180   HDL   48  37*   LDL  78  113*   TRIG  202*  149       Wt Readings from Last 3 Encounters:   05/22/18 303 lb (137.4 kg)   04/17/18 303 lb 6.4 oz (137.6 kg)   08/07/17 (!) 330 lb (149.7 kg)     Asa is included in med list    Current Outpatient Prescriptions   Medication Sig Dispense Refill     aspirin  MG tablet Take 1 tablet (325 mg) by mouth daily 90 tablet 3     atorvastatin (LIPITOR) 40 MG tablet Take 1 tablet (40 mg) by mouth daily 90 tablet 3     Carboxymeth-Glycerin-Polysorb (REFRESH OPTIVE ADVANCED) 0.5-1-0.5 % SOLN Apply 1 drop to eye 2 times daily       dulaglutide (TRULICITY) 0.75 MG/0.5ML pen Inject 0.75 mg Subcutaneous every 7 days 2 mL 1     dulaglutide (TRULICITY) 0.75 MG/0.5ML pen Inject 0.75 mg Subcutaneous every 7 days 2 mL 1     insulin glargine U-300 (TOUJEO) 300 UNIT/ML injection Inject 30 Units Subcutaneous At Bedtime 13.5 mL 1     insulin pen needle (BD RAMILA U/F) 32G X 4 MM Use 1 daily as directed. 100 each 1     lisinopril (PRINIVIL/ZESTRIL) 2.5 MG tablet Take 1 tablet (2.5 mg) by mouth daily 90 tablet 0     metFORMIN (GLUCOPHAGE-XR) 500 MG 24 hr tablet Take 2 tablets (1,000 mg) by mouth 2 times daily (with meals) 360 tablet 0     [DISCONTINUED] insulin glargine U-300 (TOUJEO) 300 UNIT/ML injection Inject 25 Units Subcutaneous At Bedtime 13.5 mL 1       Histories reviewed and updated in Epic.    EXAM:  Vitals: /81  Pulse 75  Temp 97  F (36.1  C) (Oral)  Resp 18  Ht 6' (1.829 m)  Wt 303 lb (137.4 kg)  SpO2 97%  BMI 41.09 kg/m2  BMIE= Body mass index is 41.09 kg/(m^2).  GENERAL APPEARANCE: alert and no acute distress  PSYCH: mentation appears normal and affect normal/bright  RESP: lungs clear to auscultation - no rales, rhonchi or wheezes  CV: regular rate and rhythm, normal S1 S2, no S3 or S4 and no murmur, click or rub -  EXT: no cyanosis or edema in lower extremities  SKIN: no venous stasis changes    ICD-10-CM    1. Uncontrolled type 2 diabetes mellitus without complication,  without long-term current use of insulin (H) E11.65 dulaglutide (TRULICITY) 0.75 MG/0.5ML pen    PLAN:Follow up in 3 months

## 2018-05-24 ENCOUNTER — TELEPHONE (OUTPATIENT)
Dept: EDUCATION SERVICES | Facility: CLINIC | Age: 43
End: 2018-05-24

## 2018-05-24 NOTE — TELEPHONE ENCOUNTER
Pt called saying Trulicity will cost $600/month and that is too expensive. Pt was told to check with insurance to see what GLP-1 are covered.     Insurance said oral med is best at $12/month; Glipizide or Glimepiride. Please call pt with updated plan.    Called patient, will have him wait until after he is  and on his wife's insurance.    Mckenna Suero RN/SHERIDANE  [place] Diabetes Educator

## 2018-07-18 RX ORDER — LISINOPRIL 2.5 MG/1
TABLET ORAL
Qty: 90 TABLET | Refills: 0 | Status: SHIPPED | OUTPATIENT
Start: 2018-07-18 | End: 2018-07-24

## 2018-07-24 ENCOUNTER — OFFICE VISIT (OUTPATIENT)
Dept: FAMILY MEDICINE | Facility: CLINIC | Age: 43
End: 2018-07-24
Payer: COMMERCIAL

## 2018-07-24 VITALS
DIASTOLIC BLOOD PRESSURE: 79 MMHG | BODY MASS INDEX: 42.66 KG/M2 | HEIGHT: 72 IN | HEART RATE: 90 BPM | WEIGHT: 315 LBS | OXYGEN SATURATION: 97 % | RESPIRATION RATE: 18 BRPM | TEMPERATURE: 97 F | SYSTOLIC BLOOD PRESSURE: 128 MMHG

## 2018-07-24 DIAGNOSIS — Z23 NEED FOR PROPHYLACTIC VACCINATION AGAINST STREPTOCOCCUS PNEUMONIAE (PNEUMOCOCCUS): ICD-10-CM

## 2018-07-24 DIAGNOSIS — Z13.89 SCREENING FOR DIABETIC PERIPHERAL NEUROPATHY: ICD-10-CM

## 2018-07-24 DIAGNOSIS — E66.01 MORBID OBESITY DUE TO EXCESS CALORIES (H): ICD-10-CM

## 2018-07-24 DIAGNOSIS — Z11.4 SCREENING FOR HIV (HUMAN IMMUNODEFICIENCY VIRUS): ICD-10-CM

## 2018-07-24 LAB
ANION GAP SERPL CALCULATED.3IONS-SCNC: 9 MMOL/L (ref 3–14)
BUN SERPL-MCNC: 15 MG/DL (ref 7–30)
CALCIUM SERPL-MCNC: 9 MG/DL (ref 8.5–10.1)
CHLORIDE SERPL-SCNC: 100 MMOL/L (ref 94–109)
CHOLEST SERPL-MCNC: 140 MG/DL
CO2 SERPL-SCNC: 26 MMOL/L (ref 20–32)
CREAT SERPL-MCNC: 0.73 MG/DL (ref 0.66–1.25)
GFR SERPL CREATININE-BSD FRML MDRD: >90 ML/MIN/1.7M2
GLUCOSE SERPL-MCNC: 310 MG/DL (ref 70–99)
HBA1C MFR BLD: 9.6 % (ref 0–5.6)
HDLC SERPL-MCNC: 51 MG/DL
LDLC SERPL CALC-MCNC: 62 MG/DL
NONHDLC SERPL-MCNC: 89 MG/DL
POTASSIUM SERPL-SCNC: 4 MMOL/L (ref 3.4–5.3)
SODIUM SERPL-SCNC: 135 MMOL/L (ref 133–144)
TRIGL SERPL-MCNC: 136 MG/DL
TSH SERPL DL<=0.005 MIU/L-ACNC: 2.86 MU/L (ref 0.4–4)

## 2018-07-24 PROCEDURE — 80048 BASIC METABOLIC PNL TOTAL CA: CPT | Performed by: FAMILY MEDICINE

## 2018-07-24 PROCEDURE — 84443 ASSAY THYROID STIM HORMONE: CPT | Performed by: FAMILY MEDICINE

## 2018-07-24 PROCEDURE — 36415 COLL VENOUS BLD VENIPUNCTURE: CPT | Performed by: FAMILY MEDICINE

## 2018-07-24 PROCEDURE — 80061 LIPID PANEL: CPT | Performed by: FAMILY MEDICINE

## 2018-07-24 PROCEDURE — 83036 HEMOGLOBIN GLYCOSYLATED A1C: CPT | Performed by: FAMILY MEDICINE

## 2018-07-24 PROCEDURE — 99214 OFFICE O/P EST MOD 30 MIN: CPT | Performed by: FAMILY MEDICINE

## 2018-07-24 PROCEDURE — 99207 C FOOT EXAM  NO CHARGE: CPT | Mod: 25 | Performed by: FAMILY MEDICINE

## 2018-07-24 RX ORDER — METFORMIN HCL 500 MG
1000 TABLET, EXTENDED RELEASE 24 HR ORAL 2 TIMES DAILY WITH MEALS
Qty: 360 TABLET | Refills: 0 | Status: SHIPPED | OUTPATIENT
Start: 2018-07-24 | End: 2018-08-10

## 2018-07-24 RX ORDER — LISINOPRIL 2.5 MG/1
2.5 TABLET ORAL DAILY
Qty: 90 TABLET | Refills: 0 | Status: SHIPPED | OUTPATIENT
Start: 2018-07-24 | End: 2018-10-16

## 2018-07-24 ASSESSMENT — PAIN SCALES - GENERAL: PAINLEVEL: NO PAIN (0)

## 2018-07-24 NOTE — MR AVS SNAPSHOT
After Visit Summary   7/24/2018    Norberto Doty    MRN: [id number]           Patient Information     Date Of Birth          1975        Visit Information        Provider Department      7/24/2018 9:45 AM Triston Hills MD [facility]        Today's Diagnoses     Uncontrolled type 2 diabetes mellitus without complication, without long-term current use of insulin (H)    -  1    Screening for HIV (human immunodeficiency virus)        Screening for diabetic peripheral neuropathy        Need for prophylactic vaccination against Streptococcus pneumoniae (pneumococcus)        Morbid obesity due to excess calories (H)           Follow-ups after your visit        Who to contact     If you have questions or need follow up information about today's clinic visit or your schedule please contact [facility] directly at [phone].  Normal or non-critical lab and imaging results will be communicated to you by MyChart, letter or phone within 4 business days after the clinic has received the results. If you do not hear from us within 7 days, please contact the clinic through MyChart or phone. If you have a critical or abnormal lab result, we will notify you by phone as soon as possible.  Submit refill requests through [employer] or call your pharmacy and they will forward the refill request to us. Please allow 3 business days for your refill to be completed.          Additional Information About Your Visit        Care EveryWhere ID     This is your Care EveryWhere ID. This could be used by other organizations to access your [facility] medical records  [id number]        Your Vitals Were     Pulse Temperature Respirations Height Pulse Oximetry BMI (Body Mass Index)    90 97  F (36.1  C) (Oral) 18 6' (1.829 m) 97% 43.26 kg/m2       Blood Pressure from Last 3 Encounters:   07/24/18 128/79   05/22/18 124/81   04/17/18 123/82    Weight from Last 3 Encounters:   07/24/18 319 lb (144.7  kg)   05/22/18 303 lb (137.4 kg)   04/17/18 303 lb 6.4 oz (137.6 kg)              We Performed the Following     FOOT EXAM  NO CHARGE [37525.114]     TSH WITH FREE T4 REFLEX          Today's Medication Changes          These changes are accurate as of 7/24/18 10:13 AM.  If you have any questions, ask your nurse or doctor.               Stop taking these medicines if you haven't already. Please contact your care team if you have questions.     insulin glargine U-300 300 UNIT/ML injection   Commonly known as:  TOUJEO   Stopped by:  Triston Hills MD                    Primary Care Provider Office Phone # Fax #    Triston Hills [phone] [address] [facility] [address]        Equal Access to Services     BINU PELAYO : Hadii aad ku hadashgabo Sopradeep, waaxda luqadaha, qaybta kaalmada adeegyada, magy doyle . So [facility] [phone].    ATENCIÓN: Si habla español, tiene a hall disposición servicios gratuitos de asistencia lingüística. Llame al [phone].    We comply with applicable federal civil rights laws and [place] laws. We do not discriminate on the basis of race, color, national origin, age, disability, sex, sexual orientation, or gender identity.            Thank you!     Thank you for choosing [facility]  for your care. Our goal is always to provide you with excellent care. Hearing back from our patients is one way we can continue to improve our services. Please take a few minutes to complete the written survey that you may receive in the mail after your visit with us. Thank you!             Your Updated Medication List - Protect others around you: Learn how to safely use, store and throw away your medicines at www.disposemymeds.org.          This list is accurate as of 7/24/18 10:13 AM.  Always use your most recent med list.                   Brand Name Dispense Instructions for use Diagnosis    aspirin 325 MG EC tablet      90 tablet    Take 1 tablet (325 mg) by mouth daily    Uncontrolled diabetes mellitus type 2 without complications (H)       atorvastatin 40 MG tablet    LIPITOR    90 tablet    Take 1 tablet (40 mg) by mouth daily    Uncontrolled type 2 diabetes mellitus without complication, without long-term current use of insulin (H)       Carboxymeth-Glycerin-Polysorb 0.5-1-0.5 % Soln    REFRESH OPTIVE ADVANCED     Apply 1 drop to eye 2 times daily    Punctate keratitis, bilateral       dulaglutide 0.75 MG/0.5ML pen    TRULICITY    2 mL    Inject 0.75 mg Subcutaneous every 7 days    Uncontrolled type 2 diabetes mellitus without complication, without long-term current use of insulin (H)       insulin pen needle 32G X 4 MM    BD RAMILA U/F    100 each    Use 1 daily as directed.    Uncontrolled type 2 diabetes mellitus without complication, without long-term current use of insulin (H)       lisinopril 2.5 MG tablet    PRINIVIL/Zestril    90 tablet    TAKE 1 TABLET BY MOUTH ONCE DAILY    Uncontrolled type 2 diabetes mellitus without complication, without long-term current use of insulin (H)       metFORMIN 500 MG 24 hr tablet    GLUCOPHAGE-XR    360 tablet    Take 2 tablets (1,000 mg) by mouth 2 times daily (with meals)    Uncontrolled type 2 diabetes mellitus without complication, without long-term current use of insulin (H)

## 2018-07-24 NOTE — NURSING NOTE
Chief Complaint   Patient presents with     Diabetes       Initial /79  Pulse 90  Temp 97  F (36.1  C) (Oral)  Resp 18  Ht 6' (1.829 m)  Wt 319 lb (144.7 kg)  SpO2 97%  BMI 43.26 kg/m2 Estimated body mass index is 43.26 kg/(m^2) as calculated from the following:    Height as of this encounter: 6' (1.829 m).    Weight as of this encounter: 319 lb (144.7 kg).  Medication Reconciliation: complete  Luly Gomez M.A.

## 2018-07-24 NOTE — PROGRESS NOTES
SUBJECTIVE:  Norberto Doty presents today for follow up of DIABETES MELLITUS .       Patient glucose self monitoring: one time daily, once daily.  Blood glucose averages: 220-270  Symptoms of low blood sugar (hypoglycemia)? n  Problems taking medications regularly? Not taking Toujeo because of cost  Side effects? Sometimes diarrhea  What are you doing for exercise outside of work or your daily activities? Not a regular  Reviewed health maintenance  Patient Active Problem List   Diagnosis     Punctate keratitis, bilateral     Uncontrolled diabetes mellitus type 2 without complications (H)     Morbid obesity due to excess calories (H)     Morbid obesity (H)       Smokes n  PHQ-2  Over the last two weeks- Have you been bothered by little interest or pleasure in doing things?  No  Over the last two weeks- Have you been been feeling down, depressed, or hopeless?  No    BP:   BP Readings from Last 3 Encounters:   07/24/18 128/79   05/22/18 124/81   04/17/18 123/82       Lab Results   Component Value Date    A1C 9.6 07/24/2018    A1C 12.0 04/17/2018    A1C 9.4 07/31/2017       Recent Labs   Lab Test  08/07/17   0809  04/21/16   0908   CHOL  166  180   HDL  48  37*   LDL  78  113*   TRIG  202*  149       Wt Readings from Last 3 Encounters:   07/24/18 319 lb (144.7 kg)   05/22/18 303 lb (137.4 kg)   04/17/18 303 lb 6.4 oz (137.6 kg)     Asa is included in med list    Current Outpatient Prescriptions   Medication Sig Dispense Refill     aspirin  MG tablet Take 1 tablet (325 mg) by mouth daily 90 tablet 3     atorvastatin (LIPITOR) 40 MG tablet Take 1 tablet (40 mg) by mouth daily 90 tablet 3     Carboxymeth-Glycerin-Polysorb (REFRESH OPTIVE ADVANCED) 0.5-1-0.5 % SOLN Apply 1 drop to eye 2 times daily       dulaglutide (TRULICITY) 0.75 MG/0.5ML pen Inject 0.75 mg Subcutaneous every 7 days 2 mL 1     insulin pen needle (BD RAMILA U/F) 32G X 4 MM Use 1 daily as directed. 100 each 1     lisinopril (PRINIVIL/ZESTRIL) 2.5 MG  tablet Take 1 tablet (2.5 mg) by mouth daily 90 tablet 0     metFORMIN (GLUCOPHAGE-XR) 500 MG 24 hr tablet Take 2 tablets (1,000 mg) by mouth 2 times daily (with meals) 360 tablet 0     [DISCONTINUED] lisinopril (PRINIVIL/ZESTRIL) 2.5 MG tablet TAKE 1 TABLET BY MOUTH ONCE DAILY 90 tablet 0     [DISCONTINUED] metFORMIN (GLUCOPHAGE-XR) 500 MG 24 hr tablet Take 2 tablets (1,000 mg) by mouth 2 times daily (with meals) 360 tablet 0       Histories reviewed and updated in Epic.    EXAM:  Vitals: /79  Pulse 90  Temp 97  F (36.1  C) (Oral)  Resp 18  Ht 6' (1.829 m)  Wt 319 lb (144.7 kg)  SpO2 97%  BMI 43.26 kg/m2  BMIE= Body mass index is 43.26 kg/(m^2).  GENERAL APPEARANCE: alert and no acute distress  PSYCH: mentation appears normal and affect normal/bright  RESP: lungs clear to auscultation - no rales, rhonchi or wheezes  CV: regular rate and rhythm, normal S1 S2, no S3 or S4 and no murmur, click or rub -  EXT: no cyanosis or edema in lower extremities  SKIN: no venous stasis changes    ICD-10-CM    1. Uncontrolled type 2 diabetes mellitus without complication, without long-term current use of insulin (H) E11.65 FOOT EXAM  NO CHARGE [65569.114]     TSH WITH FREE T4 REFLEX     lisinopril (PRINIVIL/ZESTRIL) 2.5 MG tablet     metFORMIN (GLUCOPHAGE-XR) 500 MG 24 hr tablet     dulaglutide (TRULICITY) 0.75 MG/0.5ML pen   2. Screening for HIV (human immunodeficiency virus) Z11.4 FOOT EXAM  NO CHARGE [81671.114]     TSH WITH FREE T4 REFLEX   3. Screening for diabetic peripheral neuropathy Z13.89 FOOT EXAM  NO CHARGE [91578.114]     TSH WITH FREE T4 REFLEX   4. Need for prophylactic vaccination against Streptococcus pneumoniae (pneumococcus) Z23 FOOT EXAM  NO CHARGE [22292.114]     TSH WITH FREE T4 REFLEX   5. Morbid obesity due to excess calories (H) E66.01     PLAN:follow up with diabetic education and consider constant glucose monitor. Follow up with obesity clinic at [place]

## 2018-08-08 ENCOUNTER — OFFICE VISIT (OUTPATIENT)
Dept: OPTOMETRY | Facility: CLINIC | Age: 43
End: 2018-08-08
Payer: COMMERCIAL

## 2018-08-08 DIAGNOSIS — Z79.4 CONTROLLED TYPE 2 DIABETES MELLITUS WITH BOTH EYES AFFECTED BY MILD NONPROLIFERATIVE RETINOPATHY WITHOUT MACULAR EDEMA, WITH LONG-TERM CURRENT USE OF INSULIN (H): Primary | ICD-10-CM

## 2018-08-08 DIAGNOSIS — E11.3293 CONTROLLED TYPE 2 DIABETES MELLITUS WITH BOTH EYES AFFECTED BY MILD NONPROLIFERATIVE RETINOPATHY WITHOUT MACULAR EDEMA, WITH LONG-TERM CURRENT USE OF INSULIN (H): Primary | ICD-10-CM

## 2018-08-08 DIAGNOSIS — H52.13 MYOPIA OF BOTH EYES: ICD-10-CM

## 2018-08-08 DIAGNOSIS — H52.4 PRESBYOPIA: ICD-10-CM

## 2018-08-08 PROCEDURE — 92014 COMPRE OPH EXAM EST PT 1/>: CPT | Performed by: OPTOMETRIST

## 2018-08-08 PROCEDURE — 92015 DETERMINE REFRACTIVE STATE: CPT | Performed by: OPTOMETRIST

## 2018-08-08 PROCEDURE — 92310 CONTACT LENS FITTING OU: CPT | Mod: GA | Performed by: OPTOMETRIST

## 2018-08-08 ASSESSMENT — VISUAL ACUITY
OS_PH_CC: 20/30+1
OS_CC: 20/30-1
OS_CC+: -2
CORRECTION_TYPE: CONTACTS
METHOD: SNELLEN - LINEAR
OD_CC+: -1
OD_PH_CC: 20/40-3
OD_CC: 20/50
OS_CC: 20/30
OD_CC: 20/100-1

## 2018-08-08 ASSESSMENT — REFRACTION_CURRENTRX
OD_SPHERE: -9.00
OD_AXIS: 095
OS_DIAMETER: 9.3
OD_BASECURVE: 42.25
OS_SPHERE: -8.50
OD_BRAND: BOSTON XO
OS_BRAND: BOSTON XO
OD_SPHERE: -8.50
OS_BASECURVE: 42.25
OD_CYLINDER: -1.25
OD_DIAMETER: 9.3
OD_BASECURVE: 42.50
OD_DIAMETER: 9.5

## 2018-08-08 ASSESSMENT — TONOMETRY
OD_IOP_MMHG: 14
OS_IOP_MMHG: 9
IOP_METHOD: APPLANATION

## 2018-08-08 ASSESSMENT — CONF VISUAL FIELD
OD_NORMAL: 1
OS_NORMAL: 1
METHOD: COUNTING FINGERS

## 2018-08-08 ASSESSMENT — REFRACTION_WEARINGRX
OD_ADD: +1.50
OD_CYLINDER: SPHERE
OS_CYLINDER: +1.75
OD_SPHERE: -9.25
OS_ADD: +1.50
OS_AXIS: 160
OS_SPHERE: -11.00

## 2018-08-08 ASSESSMENT — REFRACTION_MANIFEST
OS_AXIS: 170
OD_AXIS: 001
OD_CYLINDER: +2.00
OD_SPHERE: -10.50
OS_AXIS: 164
OD_SPHERE: -11.75
OS_SPHERE: -11.00
METHOD_AUTOREFRACTION: 1
OD_ADD: +2.00
OS_ADD: +2.00
OS_CYLINDER: +1.50
OS_SPHERE: -11.00
OS_CYLINDER: +1.25
OD_CYLINDER: +2.25
OD_AXIS: 010

## 2018-08-08 ASSESSMENT — KERATOMETRY
OD_K2POWER_DIOPTERS: 43.00
OS_K2POWER_DIOPTERS: 40.75
OD_AXISANGLE2_DEGREES: 168
OS_K1POWER_DIOPTERS: 43.50
OS_AXISANGLE2_DEGREES: 9
OD_K1POWER_DIOPTERS: 42.50

## 2018-08-08 ASSESSMENT — SLIT LAMP EXAM - LIDS
COMMENTS: 2+ PTOSIS
COMMENTS: 1+ PTOSIS

## 2018-08-08 ASSESSMENT — CUP TO DISC RATIO
OD_RATIO: 0.2
OS_RATIO: 0.2

## 2018-08-08 ASSESSMENT — EXTERNAL EXAM - LEFT EYE: OS_EXAM: NORMAL

## 2018-08-08 ASSESSMENT — EXTERNAL EXAM - RIGHT EYE: OD_EXAM: NORMAL

## 2018-08-08 NOTE — PROGRESS NOTES
"Chief Complaint   Patient presents with     Diabetic Eye Exam     Contact Lens Fitting     fee, waiver       \"takes out RGP's most nights\"  Falls asleep with them in by mistake     Lab Results   Component Value Date    A1C 9.6 07/24/2018    A1C 12.0 04/17/2018    A1C 9.4 07/31/2017    A1C 6.8 06/27/2016    A1C 14.6 04/21/2016       Previous contact lens wearer? Yes: Wearing RGP Lenses   Comfort of contact lenses :Good   Satisfied with current lenses: Thinks that the Rx has changed         Last Eye Exam: 4/21/2016  Dilated Previously: Yes    What are you currently using to see?  Contacts only , has no back up glasses     Distance Vision Acuity: Noticed gradual change in both eyes, having to work really hard to see id     Near Vision Acuity: Satisfied with vision while reading and using computer with the contacts     Eye Comfort: good  Do you use eye drops? : Yes: As needed, uses Blink for contact lens wearer - with drier   Occupation or Hobbies:        [employer]      Medical, surgical and family histories reviewed and updated 8/8/2018.       OBJECTIVE: See Ophthalmology exam    ASSESSMENT:    ICD-10-CM    1. 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 Myopia of both eyes H52.13 EYE EXAM (SIMPLE-NONBILLABLE)     REFRACTION     CONTACT LENS FITTING,BILAT   3. Presbyopia H52.4 EYE EXAM (SIMPLE-NONBILLABLE)     REFRACTION     CONTACT LENS FITTING,BILAT      PLAN:   Patient aware diabetes eye exam results will be sent to Dr Hills  Advised to take out RGP when he gets tired to prevent sleeping in contact lenses due to health risk  Patient Instructions   Patient was advised of today's exam findings.  Fill glasses prescription  Order RGP,  and wear at least 2 " hours before contact check appointment in 1-2 weeks  Work on improving blood sugar control  Return in 1 year for diabetic eye exam      Diabetes weakens the blood vessels all over the body, including the eyes. Damage to the blood vessels in the eyes can cause swelling or bleeding into part of the eye (called the retina). This is called diabetic retinopathy (CRISTINO-tin--puh-thee). If not treated, this disease can cause vision loss or blindness.   Symptoms may include blurred or distorted vision, but many people have no symptoms. It's important to see your eye doctor regularly to check for problems.   Early treatment and good control can help protect your vision. Here are the things you can do to help prevent vision loss:      1. Keep your blood sugar levels under tight control.      2. Bring high blood pressure under control.      3. No smoking.      4. Have yearly dilated eye exams.      Khadijah Rose O.D.  [facility]   [address] [place], [place] [id number] [phone]

## 2018-08-08 NOTE — PATIENT INSTRUCTIONS
Patient was advised of today's exam findings.  Fill glasses prescription  Order RGP,  and wear at least 2 hours before contact check appointment in 1-2 weeks  Work on improving blood sugar control  Return in 1 year for diabetic eye exam      Diabetes weakens the blood vessels all over the body, including the eyes. Damage to the blood vessels in the eyes can cause swelling or bleeding into part of the eye (called the retina). This is called diabetic retinopathy (CRISTINO-tin--pu-thee). If not treated, this disease can cause vision loss or blindness.   Symptoms may include blurred or distorted vision, but many people have no symptoms. It's important to see your eye doctor regularly to check for problems.   Early treatment and good control can help protect your vision. Here are the things you can do to help prevent vision loss:      1. Keep your blood sugar levels under tight control.      2. Bring high blood pressure under control.      3. No smoking.      4. Have yearly dilated eye exams.      Khadijah Rose O.D.  [facility]   [address] [place], [place] [id number] [phone]

## 2018-08-08 NOTE — MR AVS SNAPSHOT
After Visit Summary   8/8/2018    Norberto Doty    MRN: [id number]           Patient Information     Date Of Birth          1975        Visit Information        Provider Department      8/8/2018 8:40 AM Khadijah Rose OD [facility]        Today's Diagnoses     Myopia of both eyes    -  1    Presbyopia          Care Instructions    Patient was advised of today's exam findings.  Fill glasses prescription  Order RGP,  and wear at least 2 hours before contact check appointment in 1-2 weeks  Work on improving blood sugar control  Return in 1 year for diabetic eye exam      Diabetes weakens the blood vessels all over the body, including the eyes. Damage to the blood vessels in the eyes can cause swelling or bleeding into part of the eye (called the retina). This is called diabetic retinopathy (CRISTINO-tin-AH-puh-thee). If not treated, this disease can cause vision loss or blindness.   Symptoms may include blurred or distorted vision, but many people have no symptoms. It's important to see your eye doctor regularly to check for problems.   Early treatment and good control can help protect your vision. Here are the things you can do to help prevent vision loss:      1. Keep your blood sugar levels under tight control.      2. Bring high blood pressure under control.      3. No smoking.      4. Have yearly dilated eye exams.      Khadijah Rose O.D.  [facility]   [address] [place] [address]  [phone]             Follow-ups after your visit        Your next 10 appointments already scheduled     Aug 10, 2018 10:30 AM CDT   Diabetic Education with AN DIABETIC ED RESOURCE   [facility] ([facility])    [address] [facility] [address]   [phone]            Oct 16, 2018  8:00 AM CDT   SHORT with Triston Hills MD   [facility]    [address] [facility]  [id number] [phone]              Who to contact     If you have questions or need follow up information about today's clinic visit or your schedule please contact [facility] directly at [phone].  Normal or non-critical lab and imaging results will be communicated to you by MyChart, letter or phone within 4 business days after the clinic has received the results. If you do not hear from us within 7 days, please contact the clinic through MyChart or phone. If you have a critical or abnormal lab result, we will notify you by phone as soon as possible.  Submit refill requests through [employer] or call your pharmacy and they will forward the refill request to us. Please allow 3 business days for your refill to be completed.          Additional Information About Your Visit        Care EveryWhere ID     This is your Care EveryWhere ID. This could be used by other organizations to access your [place] medical records  [id number]         Blood Pressure from Last 3 Encounters:   07/24/18 128/79   05/22/18 124/81   04/17/18 123/82    Weight from Last 3 Encounters:   07/24/18 144.7 kg (319 lb)   05/22/18 137.4 kg (303 lb)   04/17/18 137.6 kg (303 lb 6.4 oz)              Today, you had the following     No orders found for display       Primary Care Provider Office Phone # Fax #    [place] Hills [phone] [address] [facility] [address]        Equal Access to Services     MARIA ELENA PELAYO AH: Hadii kimberlyn ku hadasho Soannyali, waaxda luqadaha, qaybta kaalmada adeegyada, magy weston. So [facility] [phone].    ATENCIÓN: Si habla español, tiene a hall disposición servicios gratuitos de asistencia lingüística. Llame al [phone].    We comply with applicable federal civil rights laws and [place] laws. We do not discriminate on the basis of race, color, national origin, age, disability, sex, sexual orientation, or gender identity.            Thank  you!     Thank you for choosing [facility]  for your care. Our goal is always to provide you with excellent care. Hearing back from our patients is one way we can continue to improve our services. Please take a few minutes to complete the written survey that you may receive in the mail after your visit with us. Thank you!             Your Updated Medication List - Protect others around you: Learn how to safely use, store and throw away your medicines at www.disposemymeds.org.          This list is accurate as of 8/8/18 10:36 AM.  Always use your most recent med list.                   Brand Name Dispense Instructions for use Diagnosis    aspirin 325 MG EC tablet     90 tablet    Take 1 tablet (325 mg) by mouth daily    Uncontrolled diabetes mellitus type 2 without complications (H)       atorvastatin 40 MG tablet    LIPITOR    90 tablet    Take 1 tablet (40 mg) by mouth daily    Uncontrolled type 2 diabetes mellitus without complication, without long-term current use of insulin (H)       Carboxymeth-Glycerin-Polysorb 0.5-1-0.5 % Soln    REFRESH OPTIVE ADVANCED     Apply 1 drop to eye 2 times daily    Punctate keratitis, bilateral       dulaglutide 0.75 MG/0.5ML pen    TRULICITY    2 mL    Inject 0.75 mg Subcutaneous every 7 days    Uncontrolled type 2 diabetes mellitus without complication, without long-term current use of insulin (H)       insulin pen needle 32G X 4 MM    BD RAMILA U/F    100 each    Use 1 daily as directed.    Uncontrolled type 2 diabetes mellitus without complication, without long-term current use of insulin (H)       lisinopril 2.5 MG tablet    PRINIVIL/Zestril    90 tablet    Take 1 tablet (2.5 mg) by mouth daily    Uncontrolled type 2 diabetes mellitus without complication, without long-term current use of insulin (H)       metFORMIN 500 MG 24 hr tablet    GLUCOPHAGE-XR    360 tablet    Take 2 tablets (1,000 mg) by mouth 2 times daily (with meals)    Uncontrolled type 2 diabetes  mellitus without complication, without long-term current use of insulin (H)

## 2018-08-08 NOTE — LETTER
"    8/8/2018         RE: Norberto Doty  [address]  [place] [address]        Dear Colleague,    Thank you for referring your patient, Norberto Doty, to the [facility]. Mild diabetic retinopathy was found in both eyes.  I advised him to return to clinic for dilated eye edam in 1 yearPlease see a copy of my visit note below.    Chief Complaint   Patient presents with     Diabetic Eye Exam     Contact Lens Fitting     fee, waiver       \"takes out RGP's most nights\"  Falls asleep with them in by mistake     Lab Results   Component Value Date    A1C 9.6 07/24/2018    A1C 12.0 04/17/2018    A1C 9.4 07/31/2017    A1C 6.8 06/27/2016    A1C 14.6 04/21/2016       Previous contact lens wearer? Yes: Wearing RGP Lenses   Comfort of contact lenses :Good   Satisfied with current lenses: Thinks that the Rx has changed         Last Eye Exam: 4/21/2016  Dilated Previously: Yes    What are you currently using to see?  Contacts only , has no back up glasses     Distance Vision Acuity: Noticed gradual change in both eyes, having to work really hard to see id     Near Vision Acuity: Satisfied with vision while reading and using computer with the contacts     Eye Comfort: good  Do you use eye drops? 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       OBJECTIVE: See Ophthalmology exam    ASSESSMENT:    ICD-10-CM    1. 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 Myopia of both eyes H52.13 EYE EXAM (SIMPLE-NONBILLABLE)     REFRACTION     CONTACT LENS FITTING,BILAT   3. Presbyopia H52.4 EYE EXAM (SIMPLE-NONBILLABLE)     " REFRACTION     CONTACT LENS FITTING,BILAT      PLAN:   Patient aware diabetes eye exam results will be sent to Dr Hills  Patient Instructions   Patient was advised of today's exam findings.  Fill glasses prescription  Order RGP,  and wear at least 2 hours before contact check appointment in 1-2 weeks  Work on improving blood sugar control  Return in 1 year for diabetic eye exam      Diabetes weakens the blood vessels all over the body, including the eyes. Damage to the blood vessels in the eyes can cause swelling or bleeding into part of the eye (called the retina). This is called diabetic retinopathy (CRISTINO-tin-AH-puh-thee). If not treated, this disease can cause vision loss or blindness.   Symptoms may include blurred or distorted vision, but many people have no symptoms. It's important to see your eye doctor regularly to check for problems.   Early treatment and good control can help protect your vision. Here are the things you can do to help prevent vision loss:      1. Keep your blood sugar levels under tight control.      2. Bring high blood pressure under control.      3. No smoking.      4. Have yearly dilated eye exams.      Khadijah Rose O.D.  [facility]   [id number] [place] [address] [phone]                          Again, thank you for allowing me to participate in the care of your patient.        Sincerely,        Khadijah Rose OD

## 2018-08-10 ENCOUNTER — ALLIED HEALTH/NURSE VISIT (OUTPATIENT)
Dept: EDUCATION SERVICES | Facility: CLINIC | Age: 43
End: 2018-08-10
Payer: COMMERCIAL

## 2018-08-10 DIAGNOSIS — E11.9 DIABETES MELLITUS WITHOUT COMPLICATION (H): ICD-10-CM

## 2018-08-10 PROCEDURE — G0108 DIAB MANAGE TRN  PER INDIV: HCPCS

## 2018-08-10 PROCEDURE — 99207 ZZC DROP WITH A PROCEDURE: CPT

## 2018-08-10 RX ORDER — LANCETS
EACH MISCELLANEOUS
Qty: 102 EACH | Refills: 11 | Status: SHIPPED | OUTPATIENT
Start: 2018-08-10 | End: 2023-02-22

## 2018-08-10 RX ORDER — METFORMIN HCL 500 MG
1000 TABLET, EXTENDED RELEASE 24 HR ORAL 2 TIMES DAILY WITH MEALS
Qty: 360 TABLET | Refills: 1 | Status: SHIPPED | OUTPATIENT
Start: 2018-08-10 | End: 2019-07-02

## 2018-08-10 NOTE — PROGRESS NOTES
Diabetes Self Management Training: Follow-up Visit    Norberto Doty presents today for education, evaluation of glucose control and modification of medication(s) Type 2 diabetes.    He is accompanied by self    Patient's diabetes management related comments/concerns: his eyes have improved.     Patient would like this visit to be focused around the following diabetes-related behaviors and goals: check out BG and keep them under control    ASSESSMENT:  Patient Problem List reviewed for relevant medical history and current medical status. Norberto is now on his wifes insurance and will help with medication costs as well.    Current Diabetes Management per Patient:  Taking diabetes medications?   yes:     Diabetes Medication(s)     Biguanides Sig    metFORMIN (GLUCOPHAGE-XR) 500 MG 24 hr tablet Take 2 tablets (1,000 mg) by mouth 2 times daily (with meals)    Incretin Mimetic Agents (GLP-1 Receptor Agonists) Sig    dulaglutide (TRULICITY) 0.75 MG/0.5ML pen Inject 0.75 mg Subcutaneous every 7 days      , Oral Medications: Metformin - Dose:  mg takes 1000 mg , Time: breakfast and supper, Injectable Medications: Trulicity 0.75mg once weekly    Do you have any difficulty affording your medications or glucose monitoring supplies?  No      *Abbreviated insulin dose documentation key: Insulin trade name (xsqhjxxpb-yuluw-wrczvj-bedtime) - i.e. Humalog 5-5-5-0 (Humalog 5 units at breakfast, 5 units at lunch, and 5 units at dinner).    Patient glucose self monitoring as follows: two times daily.   BG meter: Akray meter  BG results: see blood glucose log attached to this encounter       BG values are: Not in goal  Patient's most recent   Lab Results   Component Value Date    A1C 9.6 07/24/2018    is not meeting goal of <7.0    Nutrition:  Patient currently eats 3 meals per day    Breakfast - banana and breakfast sandwich at [employer] and ice coffee or [employer] breakfast bars.    Lunch - subway, goes to fast food , 1/4 lb  hamburger with cheese and yogurt parfait.     Dinner - grilled steak or chicken or pork and steamed veggies.     Snacks -     Beverages: water, coffee, iced, diet coke or doctor. Pepper    Cultural/Scientologist diet restrictions: No     Biggest Challenge to Healthy Eating: eating out    Physical Activity:    Type: walking while at work.     Diabetes Complications:  Chronic Complication Prevention: Eyes: exam within in the last year? Yes  Nerve/Circulation: foot exam within the last year Yes  Dental Health: brushing/flossing regularly Yes, dental exam within last year No    Recent health service and resource utilization related to diabetes (hyperglycemia, hypoglycemia, etc.):  None    Vitals:  There were no vitals taken for this visit.  Estimated body mass index is 43.26 kg/(m^2) as calculated from the following:    Height as of 7/24/18: 1.829 m (6').    Weight as of 7/24/18: 144.7 kg (319 lb).   Last 3 BP:   BP Readings from Last 3 Encounters:   07/24/18 128/79   05/22/18 124/81   04/17/18 123/82       History   Smoking Status     Never Smoker   Smokeless Tobacco     Never Used       Labs:  Lab Results   Component Value Date    A1C 9.6 07/24/2018     Lab Results   Component Value Date     07/24/2018     Lab Results   Component Value Date    LDL 62 07/24/2018     HDL Cholesterol   Date Value Ref Range Status   07/24/2018 51 >39 mg/dL Final   ]  GFR Estimate   Date Value Ref Range Status   07/24/2018 >90 >60 mL/min/1.7m2 Final     Comment:     Non  GFR Calc     GFR Estimate If Black   Date Value Ref Range Status   07/24/2018 >90 >60 mL/min/1.7m2 Final     Comment:      GFR Calc     Lab Results   Component Value Date    CR 0.73 07/24/2018     No results found for: MICROALBUMIN    Health Beliefs and Attitudes:   Patient Activation Measure Survey Score:  MELANI Score (Last Two) 4/21/2016   MELANI Raw Score 52   Activation Score 100   MELANI Level 4       Stage of Change: MAINTENANCE (Working to  maintain change, with risk of relapse)    Progress toward meeting diabetes-related behavioral goals:    GOALS % Met Goal   Healthy Eating  50   Physical Activity  50   Monitoring  50   Medication Taking  100   Problem Solving     Healthy Coping     Risk Reduction           Diabetes knowledge and skills assessment:     Patient is knowledgeable in diabetes management concepts related to: Healthy Eating, Being Active, Monitoring and Taking Medication    Patient needs further education on the following diabetes management concepts: Healthy Eating, Being Active and Taking Medication    Barriers to Learning Assessment: No Barriers identified    Based on learning assessment above, most appropriate setting for further diabetes education would be: Individual setting.    INTERVENTION:    Education provided today on:  AADE Self-Care Behaviors:  Healthy Eating: carbohydrate counting, consistency in amount, composition, and timing of food intake, weight reduction, heart healthy diet, eating out, portion control, plate planning method and label reading  Being Active: relationship to blood glucose and describe appropriate activity program  Monitoring: purpose, proper technique, log and interpret results, individual blood glucose targets and frequency of monitoring  Taking Medication: action of prescribed medication, drawing up, administering and storing injectable diabetes medications, proper site selection and rotation for injections, side effects of prescribed medications and when to take medications    Opportunities for ongoing education and support in diabetes-self management were discussed.    Pt verbalized understanding of concepts discussed and recommendations provided today.       Education Materials Provided:  Carbohydrate Counting and My Plate Planner    PLAN:  See Patient Instructions for co-developed, patient-stated behavior change goals.  AVS printed and provided to patient today.    FOLLOW-UP:  Follow-up appointment  scheduled on Friday Aug. 17 with Estrellita in [place].  Chart routed to referring provider.    Ongoing plan for education and support: Weight loss program, Follow-up visit with diabetes educator in [place] and Follow-up with primary care provider    Mckenna Suero RN/WALDO Sharma Diabetes Educator    Time Spent: 60 minutes  Encounter Type: Individual    Any diabetes medication dose changes were made via the CDE Protocol and Collaborative Practice Agreement with the patient's primary care provider. A copy of this encounter was shared with the provider.

## 2018-08-10 NOTE — MR AVS SNAPSHOT
After Visit Summary   8/10/2018    Norberto Doty    MRN: [id number]           Patient Information     Date Of Birth          1975        Visit Information        Provider Department      8/10/2018 10:30 AM AN DIABETIC ED RESOURCE [facility]        Today's Diagnoses     Uncontrolled type 2 diabetes mellitus without complication, without long-term current use of insulin (H)    -  1      Care Instructions    My Diabetes Care Goals:    Healthy Eating: I will eat less fast foods , try to bring your own meals in a lunch container.  More fruits during the daytime.    Being Active: I will try to walk more at night or daily with your wife.    Monitoring: I will continue to check twice per day.    Taking Medication: I will take Metformin  mg take 2 tablets twice daily.   Trulicity increase to 1.5 mg weekly    Follow up:  Follow-up diabetes education appointment scheduled on Friday Aug 17 @ 10:00 in [place] with Estrellita Perez RD/CDE.     Follow up with Dr. Chang in [place]  Make a dental appointment.     Diabetes Support Resources:  Weight Management: [facility] Providers - talk to your doctor about a referral to a Weight      Bring blood glucose meter and logbook with you to all doctor and follow-up appointments.     [facility] for the [facility]:  For Your Diabetes Education and Nutrition Appointments Call:  [phone]   For Diabetes Education or Nutrition Related Questions:   Phone: [phone]  E-mail: [email or web address]  Fax: [phone]   If you need a medication refill please contact your pharmacy. Please allow 3 business days for your refills to be completed.    Instructions for emailing the Diabetes Educators    If you need to communicate a non-urgent message to a Diabetes Educator via email, please send to [email or web address].    Please follow the  following email guidelines:    Subject line: Secure: your clinic name (example: Secure: [facility])  In the email please include: First name, middle initial, last name and date of birth.    We will be in touch with you within one (1) business day.             Follow-ups after your visit        Additional Services     NUTRITION REFERRAL       Your provider has referred you to: [facility]: [facility] [place] [phone]   [email or web address]    Please be aware that coverage of these services is subject to the terms and limitations of your health insurance plan.  Call member services at your health plan with any benefit or coverage questions.      Please bring the following with you to your appointment:    (1) This referral request  (2) Any documents given to you regarding this referral  (3) Any specific questions you have about diet and/or food choices                  Your next 10 appointments already scheduled     Aug 17, 2018 10:00 AM CDT   Diabetic Education with BE DIABETIC ED RESOURCE   [facility]    [address]  [place] [address] [phone]            Oct 16, 2018  8:00 AM CDT   [facility] [place] [facility]   [facility]    [address] [facility] [address] [phone]              Future tests that were ordered for you today     Open Future Orders        Priority Expected Expires Ordered    NUTRITION REFERRAL Routine 8/10/2018 8/10/2019 8/10/2018            Who to contact     If you have questions or need follow up information about today's clinic visit or your schedule please contact [facility] directly at [phone].  Normal or non-critical lab and imaging results will be communicated to you by MyChart, letter or phone within 4 business days after the clinic has received the results. If you do not hear from us within 7 days,  please contact the clinic through [employer] or phone. If you have a critical or abnormal lab result, we will notify you by phone as soon as possible.  Submit refill requests through [employer] or call your pharmacy and they will forward the refill request to us. Please allow 3 business days for your refill to be completed.          Additional Information About Your Visit        Care EveryWhere ID     This is your Care EveryWhere ID. This could be used by other organizations to access your [facility] medical records  [id number]         Blood Pressure from Last 3 Encounters:   07/24/18 128/79   05/22/18 124/81   04/17/18 123/82    Weight from Last 3 Encounters:   07/24/18 144.7 kg (319 lb)   05/22/18 137.4 kg (303 lb)   04/17/18 137.6 kg (303 lb 6.4 oz)                 Today's Medication Changes          These changes are accurate as of 8/10/18 11:29 AM.  If you have any questions, ask your nurse or doctor.               These medicines have changed or have updated prescriptions.        Dose/Directions    * dulaglutide 0.75 MG/0.5ML pen   Commonly known as:  TRULICITY   This may have changed:  Another medication with the same name was added. Make sure you understand how and when to take each.   Used for:  Uncontrolled type 2 diabetes mellitus without complication, without long-term current use of insulin (H)        Dose:  0.75 mg   Inject 0.75 mg Subcutaneous every 7 days   Quantity:  2 mL   Refills:  1       * dulaglutide 1.5 MG/0.5ML pen   Commonly known as:  TRULICITY   This may have changed:  You were already taking a medication with the same name, and this prescription was added. Make sure you understand how and when to take each.   Used for:  Uncontrolled type 2 diabetes mellitus without complication, without long-term current use of insulin (H)        Dose:  1.5 mg   Inject 1.5 mg Subcutaneous every 7 days   Quantity:  2 mL   Refills:  3       * Notice:  This list has 2 medication(s) that are the same as other  medications prescribed for you. Read the directions carefully, and ask your doctor or other care provider to review them with you.         Where to get your medicines      These medications were sent to [facility] [address] [place] [address] [address] [facility]  [address] [facility] [address]     Phone:  [phone]     dulaglutide 1.5 MG/0.5ML pen    metFORMIN 500 MG 24 hr tablet                Primary Care Provider Office Phone # Fax #    [place] [phone] [address] [facility] [address]        Equal Access to Services     [facility]: Hadii aad ku hadasho Soomaali, waaxda luqadaha, qaybta kaalmada adeegyada, waxdani ballesterosin hayyifann adeflorin doyle . So [facility] [phone].    ATENCIÓN: Si habla español, tiene a hall disposición servicios gratuitos de asistencia lingüística. [facility] [phone].    We comply with applicable federal civil rights laws and [place] laws. We do not discriminate on the basis of race, color, national origin, age, disability, sex, sexual orientation, or gender identity.            Thank you!     Thank you for choosing [facility]  for your care. Our goal is always to provide you with excellent care. Hearing back from our patients is one way we can continue to improve our services. Please take a few minutes to complete the written survey that you may receive in the mail after your visit with us. Thank you!             Your Updated Medication List - Protect others around you: Learn how to safely use, store and throw away your medicines at www.disposemymeds.org.          This list is accurate as of 8/10/18 11:29 AM.  Always use your most recent med list.                   Brand Name Dispense Instructions for use Diagnosis    aspirin 325 MG EC tablet     90 tablet    Take 1 tablet (325 mg) by mouth daily    Uncontrolled diabetes mellitus type 2 without complications (H)       atorvastatin 40 MG tablet    LIPITOR    90  tablet    Take 1 tablet (40 mg) by mouth daily    Uncontrolled type 2 diabetes mellitus without complication, without long-term current use of insulin (H)       Carboxymeth-Glycerin-Polysorb 0.5-1-0.5 % Soln    REFRESH OPTIVE ADVANCED     Apply 1 drop to eye 2 times daily    Punctate keratitis, bilateral       * dulaglutide 0.75 MG/0.5ML pen    TRULICITY    2 mL    Inject 0.75 mg Subcutaneous every 7 days    Uncontrolled type 2 diabetes mellitus without complication, without long-term current use of insulin (H)       * dulaglutide 1.5 MG/0.5ML pen    TRULICITY    2 mL    Inject 1.5 mg Subcutaneous every 7 days    Uncontrolled type 2 diabetes mellitus without complication, without long-term current use of insulin (H)       insulin pen needle 32G X 4 MM    BD RAMILA U/F    100 each    Use 1 daily as directed.    Uncontrolled type 2 diabetes mellitus without complication, without long-term current use of insulin (H)       lisinopril 2.5 MG tablet    PRINIVIL/Zestril    90 tablet    Take 1 tablet (2.5 mg) by mouth daily    Uncontrolled type 2 diabetes mellitus without complication, without long-term current use of insulin (H)       metFORMIN 500 MG 24 hr tablet    GLUCOPHAGE-XR    360 tablet    Take 2 tablets (1,000 mg) by mouth 2 times daily (with meals)    Uncontrolled type 2 diabetes mellitus without complication, without long-term current use of insulin (H)       * Notice:  This list has 2 medication(s) that are the same as other medications prescribed for you. Read the directions carefully, and ask your doctor or other care provider to review them with you.

## 2018-08-10 NOTE — PATIENT INSTRUCTIONS
My Diabetes Care Goals:    Healthy Eating: I will eat less fast foods , try to bring your own meals in a lunch container.  More fruits during the daytime.    Being Active: I will try to walk more at night or daily with your wife.    Monitoring: I will continue to check twice per day.    Taking Medication: I will take Metformin  mg take 2 tablets twice daily.   Trulicity increase to 1.5 mg weekly    Follow up:  Follow-up diabetes education appointment scheduled on Friday Aug 17 @ 10:00 in [place] with Estrellita Perez RD/SHERIDANE.     Follow up with Dr. Chang in [place]  Make a dental appointment.     Diabetes Support Resources:  Weight Management: [facility] Providers - talk to your doctor about a referral to a Weight      Bring blood glucose meter and logbook with you to all doctor and follow-up appointments.     [facility] for the [facility]:  For Your Diabetes Education and Nutrition Appointments Call:  [phone]   For Diabetes Education or Nutrition Related Questions:   Phone: [phone]  E-mail: [email or web address]  Fax: [phone]   If you need a medication refill please contact your pharmacy. Please allow 3 business days for your refills to be completed.    Instructions for emailing the Diabetes Educators    If you need to communicate a non-urgent message to a Diabetes Educator via email, please send to [email or web address].    Please follow the following email guidelines:    Subject line: Secure: your clinic name (example: Secure: [facility])  In the email please include: First name, middle initial, last name and date of birth.    We will be in touch with you within one (1) business day.

## 2018-09-17 ENCOUNTER — OFFICE VISIT (OUTPATIENT)
Dept: OPTOMETRY | Facility: CLINIC | Age: 43
End: 2018-09-17
Payer: COMMERCIAL

## 2018-09-17 DIAGNOSIS — H52.13 MYOPIA OF BOTH EYES: Primary | ICD-10-CM

## 2018-09-17 DIAGNOSIS — H52.223 REGULAR ASTIGMATISM OF BOTH EYES: ICD-10-CM

## 2018-09-17 PROCEDURE — 99207 ZZC NO BILLABLE SERVICE THIS VISIT: CPT | Performed by: OPTOMETRIST

## 2018-09-17 ASSESSMENT — REFRACTION_CURRENTRX
OD_AXIS: 095
OD_DIAMETER: 9.5
OS_AXIS: 065
OS_SPHERE: -8.50
OD_SPHERE: -9.25
OD_DIAMETER: 9.5
OD_AXIS: 085
OS_BASECURVE: 42.25
OS_DIAMETER: 9.3
OS_SPHERE: -9.75
OD_CYLINDER: -1.25
OD_CYLINDER: -1.25
OS_CYLINDER: -1.25
OD_SPHERE: -9.00
OS_DIAMETER: 9.5
OD_BASECURVE: 42.50

## 2018-09-17 ASSESSMENT — VISUAL ACUITY
OD_CC: 20/40
OD_CC: 20/100
OS_CC: 20/30
METHOD: SNELLEN - LINEAR
OS_CC+: -3
CORRECTION_TYPE: CONTACTS
OS_CC: 20/30

## 2018-09-17 ASSESSMENT — REFRACTION_MANIFEST
METHOD_AUTOREFRACTION: 1
OD_CYLINDER: +0.50
OS_CYLINDER: +1.00
OS_CYLINDER: +1.25
OS_SPHERE: -1.50
OD_CYLINDER: +0.50
OD_SPHERE: -0.50
OD_AXIS: 018
OD_AXIS: 135
OS_SPHERE: -1.50
OD_SPHERE: +0.75
OS_AXIS: 135
OS_AXIS: 155

## 2018-09-17 NOTE — PATIENT INSTRUCTIONS
Patient was advised of today's exam findings.    Order RGP , pick them up and wear to contact lenses check appointment 1.5 - 2 weeks .      Khadijah Rose O.D.  [facility]   [address] [place] [address] [phone]

## 2018-09-17 NOTE — PROGRESS NOTES
Chief Complaint   Patient presents with     Contact Lens Check     picked up lenses 8/28, started wearing them the next day      Satisfied with contacts:  No , can't see far with them    Good comfort:  Yes  Clear vision:     Not good. Near vision is ok, not good in the distance. Signs not clear     Haley Miller Optometric Assistant           Medical, surgical and family histories reviewed and updated 9/17/2018.       OBJECTIVE: See Ophthalmology exam    ASSESSMENT:    ICD-10-CM    1. Myopia of both eyes H52.13    2. Regular astigmatism of both eyes H52.223       PLAN:    Patient Instructions   Patient was advised of today's exam findings.    Order RGP , pick them up and wear to contact lenses check appointment 1.5 - 2 weeks .      Khadijah Rose O.D.  [facility]   [address] [place], [place] [id number] [phone]

## 2018-09-17 NOTE — LETTER
9/17/2018         RE: Norberto Doty  [address]  [place] [address]        Dear Colleague,    Thank you for referring your patient, Norberto Doty, to the [facility]. Please see a copy of my visit note below.    Chief Complaint   Patient presents with     Contact Lens Check     picked up lenses 8/28, started wearing them the next day      Satisfied with contacts:  No , can't see far with them    Good comfort:  Yes  Clear vision:     Not good. Near vision is ok, not good in the distance. Signs not clear     Haleyjoce Miller Optometric Assistant           Medical, surgical and family histories reviewed and updated 9/17/2018.       OBJECTIVE: See Ophthalmology exam    ASSESSMENT:    ICD-10-CM    1. Myopia of both eyes H52.13    2. Regular astigmatism of both eyes H52.223       PLAN:    Patient Instructions   Patient was advised of today's exam findings.    Order RGP , pick them up and wear to contact lenses check appointment 1.5 - 2 weeks .      Khadijah Rose O.D.  [facility]   [address] [place], [place] [id number] [phone]                         Again, thank you for allowing me to participate in the care of your patient.        Sincerely,        Khadijah Rose, OD

## 2018-09-17 NOTE — MR AVS SNAPSHOT
After Visit Summary   9/17/2018    Norberto Doty    MRN: [id number]           Patient Information     Date Of Birth          1975        Visit Information        Provider Department      9/17/2018 2:20 PM Khadijah Rose OD [facility]        Care Instructions    Patient was advised of today's exam findings.    Order RGP , pick them up and wear to contact lenses check appointment 1.5 - 2 weeks .      Khadijah Rose O.D.  [facility]   [address] [place] [address]  [phone]              Follow-ups after your visit        Your next 10 appointments already scheduled     Sep 28, 2018  8:00 AM CDT   Diabetes Education with BE DIABETIC ED RESOURCE   [facility] ([facility])    [address] [facility] [address] [phone]            Oct 16, 2018  8:00 AM CDT   SHORT with [place] MD   [facility]    [address] [facility] [address] [phone]              Who to contact     If you have questions or need follow up information about today's clinic visit or your schedule please contact [facility] directly at [phone].  Normal or non-critical lab and imaging results will be communicated to you by MyChart, letter or phone within 4 business days after the clinic has received the results. If you do not hear from us within 7 days, please contact the clinic through MyChart or phone. If you have a critical or abnormal lab result, we will notify you by phone as soon as possible.  Submit refill requests through [employer] or call your pharmacy and they will forward the refill request to us. Please allow 3 business days for your refill to be completed.          Additional Information About Your Visit        Care EveryWhere ID     This is your Care EveryWhere ID. This could be used by other organizations to access your [facility]  records  [id number]         Blood Pressure from Last 3 Encounters:   07/24/18 128/79   05/22/18 124/81   04/17/18 123/82    Weight from Last 3 Encounters:   07/24/18 144.7 kg (319 lb)   05/22/18 137.4 kg (303 lb)   04/17/18 137.6 kg (303 lb 6.4 oz)              Today, you had the following     No orders found for display       Primary Care Provider Office Phone # Fax #    [place] [phone] [address] [facility] [address]        Equal Access to Services     [facility]: Hadii kimberlyn alexander hadasho Sopradeep, waaxda luqadaha, qaybta kaalmada adeflorinyada, magy doyle . So [facility] [phone].    ATENCIÓN: Si habla español, tiene a hall disposición servicios gratuitos de asistencia lingüística. Llame al [phone].    We comply with applicable federal civil rights laws and [place] laws. We do not discriminate on the basis of race, color, national origin, age, disability, sex, sexual orientation, or gender identity.            Thank you!     Thank you for choosing [facility]  for your care. Our goal is always to provide you with excellent care. Hearing back from our patients is one way we can continue to improve our services. Please take a few minutes to complete the written survey that you may receive in the mail after your visit with us. Thank you!             Your Updated Medication List - Protect others around you: Learn how to safely use, store and throw away your medicines at www.disposemymeds.org.          This list is accurate as of 9/17/18  3:16 PM.  Always use your most recent med list.                   Brand Name Dispense Instructions for use Diagnosis    aspirin 325 MG EC tablet     90 tablet    Take 1 tablet (325 mg) by mouth daily    Uncontrolled diabetes mellitus type 2 without complications (H)       atorvastatin 40 MG tablet    LIPITOR    90 tablet    Take 1 tablet (40 mg) by mouth daily    Uncontrolled type 2 diabetes  mellitus without complication, without long-term current use of insulin (H)       blood glucose monitoring lancets     102 each    Use to test blood sugar 3 times daily or as directed.  Ok to substitute alternative if insurance prefers.    Uncontrolled type 2 diabetes mellitus without complication, without long-term current use of insulin (H)       blood glucose monitoring test strip    ACCU-CHEK GUIDE    200 strip    Use to test blood sugar 3 times daily or as directed.    Uncontrolled type 2 diabetes mellitus without complication, without long-term current use of insulin (H)       Carboxymeth-Glycerin-Polysorb 0.5-1-0.5 % Soln    REFRESH OPTIVE ADVANCED     Apply 1 drop to eye 2 times daily    Punctate keratitis, bilateral       * dulaglutide 0.75 MG/0.5ML pen    TRULICITY    2 mL    Inject 0.75 mg Subcutaneous every 7 days    Uncontrolled type 2 diabetes mellitus without complication, without long-term current use of insulin (H)       * dulaglutide 1.5 MG/0.5ML pen    TRULICITY    2 mL    Inject 1.5 mg Subcutaneous every 7 days    Uncontrolled type 2 diabetes mellitus without complication, without long-term current use of insulin (H)       insulin pen needle 32G X 4 MM    BD RAMILA U/F    100 each    Use 1 daily as directed.    Uncontrolled type 2 diabetes mellitus without complication, without long-term current use of insulin (H)       lisinopril 2.5 MG tablet    PRINIVIL/Zestril    90 tablet    Take 1 tablet (2.5 mg) by mouth daily    Uncontrolled type 2 diabetes mellitus without complication, without long-term current use of insulin (H)       metFORMIN 500 MG 24 hr tablet    GLUCOPHAGE-XR    360 tablet    Take 2 tablets (1,000 mg) by mouth 2 times daily (with meals)    Uncontrolled type 2 diabetes mellitus without complication, without long-term current use of insulin (H)       * Notice:  This list has 2 medication(s) that are the same as other medications prescribed for you. Read the directions carefully, and  ask your doctor or other care provider to review them with you.

## 2018-10-11 ENCOUNTER — OFFICE VISIT (OUTPATIENT)
Dept: OPTOMETRY | Facility: CLINIC | Age: 43
End: 2018-10-11
Payer: COMMERCIAL

## 2018-10-11 DIAGNOSIS — H52.223 REGULAR ASTIGMATISM OF BOTH EYES: ICD-10-CM

## 2018-10-11 DIAGNOSIS — H52.4 PRESBYOPIA: ICD-10-CM

## 2018-10-11 DIAGNOSIS — H52.13 MYOPIA OF BOTH EYES: Primary | ICD-10-CM

## 2018-10-11 PROCEDURE — 99207 ZZC NO BILLABLE SERVICE THIS VISIT: CPT | Performed by: OPTOMETRIST

## 2018-10-11 ASSESSMENT — REFRACTION_CURRENTRX
OD_DIAMETER: 9.5
OD_CYLINDER: -1.25
OD_CYLINDER: -1.25
OD_DIAMETER: 9.5

## 2018-10-11 ASSESSMENT — VISUAL ACUITY
CORRECTION_TYPE: CONTACTS
OD_CC: 20/40
OS_CC+: -1
METHOD: SNELLEN - LINEAR
OD_CC+: -1
OS_CC: 20/20
OD_CC: 20/70-1
OS_CC: 20/30

## 2018-10-11 NOTE — MR AVS SNAPSHOT
After Visit Summary   10/11/2018    Norberto Doty    MRN: [id number]           Patient Information     Date Of Birth          1975        Visit Information        Provider Department      10/11/2018 3:40 PM Khadijah Rose, [facility]        Today's Diagnoses     Myopia of both eyes    -  1    Regular astigmatism of both eyes        Presbyopia          Care Instructions    Patient was advised of today's exam findings.  Made changes to both RGP's to minimize glare  Order RGP,  and wear at least 2 hours before contact check appointment in 1-2 weeks  Clinic needs to return RGP's for credit.      Khadijah Rose O.D.  [facility]   [address] [place], [place] [id number] [phone]            Follow-ups after your visit        Your next 10 appointments already scheduled     Oct 16, 2018  8:00 AM CDT   SHORT with Triston Hills MD   [facility]    [address] [facility] [address] [phone]              Who to contact     If you have questions or need follow up information about today's clinic visit or your schedule please contact [facility] directly at [phone].  Normal or non-critical lab and imaging results will be communicated to you by MyChart, letter or phone within 4 business days after the clinic has received the results. If you do not hear from us within 7 days, please contact the clinic through MyChart or phone. If you have a critical or abnormal lab result, we will notify you by phone as soon as possible.  Submit refill requests through copygram or call your pharmacy and they will forward the refill request to us. Please allow 3 business days for your refill to be completed.          Additional Information About Your Visit        Care EveryWhere ID     This is your Care EveryWhere ID. This could be used by other organizations to access your [facility]  records  [id number]         Blood Pressure from Last 3 Encounters:   07/24/18 128/79   05/22/18 124/81   04/17/18 123/82    Weight from Last 3 Encounters:   07/24/18 144.7 kg (319 lb)   05/22/18 137.4 kg (303 lb)   04/17/18 137.6 kg (303 lb 6.4 oz)              Today, you had the following     No orders found for display       Primary Care Provider Office Phone # Fax #    Triston Nitin Hills [phone] [address] [facility] [address]        Equal Access to Services     [facility]: Hadii kimberlyn alexander hadasho Sopradeep, waaxda luqadaha, qaybta kaalmada adeflorinyada, magy doyle . So [facility] [phone].    ATENCIÓN: Si habla español, tiene a hall disposición servicios gratuitos de asistencia lingüística. Llame al [phone].    We comply with applicable federal civil rights laws and [place] laws. We do not discriminate on the basis of race, color, national origin, age, disability, sex, sexual orientation, or gender identity.            Thank you!     Thank you for choosing [facility]  for your care. Our goal is always to provide you with excellent care. Hearing back from our patients is one way we can continue to improve our services. Please take a few minutes to complete the written survey that you may receive in the mail after your visit with us. Thank you!             Your Updated Medication List - Protect others around you: Learn how to safely use, store and throw away your medicines at www.disposemymeds.org.          This list is accurate as of 10/11/18 11:59 PM.  Always use your most recent med list.                   Brand Name Dispense Instructions for use Diagnosis    aspirin 325 MG EC tablet     90 tablet    Take 1 tablet (325 mg) by mouth daily    Uncontrolled diabetes mellitus type 2 without complications (H)       atorvastatin 40 MG tablet    LIPITOR    90 tablet    Take 1 tablet (40 mg) by mouth daily    Uncontrolled type 2 diabetes  mellitus without complication, without long-term current use of insulin (H)       blood glucose monitoring lancets     102 each    Use to test blood sugar 3 times daily or as directed.  Ok to substitute alternative if insurance prefers.    Uncontrolled type 2 diabetes mellitus without complication, without long-term current use of insulin (H)       blood glucose monitoring test strip    ACCU-CHEK GUIDE    200 strip    Use to test blood sugar 3 times daily or as directed.    Uncontrolled type 2 diabetes mellitus without complication, without long-term current use of insulin (H)       Carboxymeth-Glycerin-Polysorb 0.5-1-0.5 % Soln    REFRESH OPTIVE ADVANCED     Apply 1 drop to eye 2 times daily    Punctate keratitis, bilateral       * dulaglutide 0.75 MG/0.5ML pen    TRULICITY    2 mL    Inject 0.75 mg Subcutaneous every 7 days    Uncontrolled type 2 diabetes mellitus without complication, without long-term current use of insulin (H)       * dulaglutide 1.5 MG/0.5ML pen    TRULICITY    2 mL    Inject 1.5 mg Subcutaneous every 7 days    Uncontrolled type 2 diabetes mellitus without complication, without long-term current use of insulin (H)       insulin pen needle 32G X 4 MM    BD RAMILA U/F    100 each    Use 1 daily as directed.    Uncontrolled type 2 diabetes mellitus without complication, without long-term current use of insulin (H)       lisinopril 2.5 MG tablet    PRINIVIL/Zestril    90 tablet    Take 1 tablet (2.5 mg) by mouth daily    Uncontrolled type 2 diabetes mellitus without complication, without long-term current use of insulin (H)       metFORMIN 500 MG 24 hr tablet    GLUCOPHAGE-XR    360 tablet    Take 2 tablets (1,000 mg) by mouth 2 times daily (with meals)    Uncontrolled type 2 diabetes mellitus without complication, without long-term current use of insulin (H)       * Notice:  This list has 2 medication(s) that are the same as other medications prescribed for you. Read the directions carefully, and  ask your doctor or other care provider to review them with you.

## 2018-10-11 NOTE — PROGRESS NOTES
Chief Complaint   Patient presents with     Contact Lens Check     Checking new set of RGP lenses     Satisfied with contacts:  They're better than the others.     Good comfort:  They took a bit to get used to, but it's gotten better  Clear vision:     It's ok, there is glare in left > right  at night and on electronics. Seems to notice it more with his left eye    Haley Miller Optometric Assistant           Medical, surgical and family histories reviewed and updated 10/11/2018.       OBJECTIVE: See Ophthalmology exam    ASSESSMENT:    ICD-10-CM    1. Myopia of both eyes H52.13    2. Regular astigmatism of both eyes H52.223    3. Presbyopia H52.4       PLAN:    Patient Instructions   Patient was advised of today's exam findings.  Made changes to both RGP's to minimize glare  Order RGP,  and wear at least 2 hours before contact check appointment in 1-2 weeks  Clinic needs to return RGP's for credit.      Khadijah Rose O.D.  [facility]   [address] [place], [place] [id number] [phone]

## 2018-10-11 NOTE — LETTER
10/11/2018         RE: Norberto Doty  [address]  [place] [address]        Dear Colleague,    Thank you for referring your patient, Norberto Doty, to the [facility]. Please see a copy of my visit note below.    Chief Complaint   Patient presents with     Contact Lens Check     Checking new set of RGP lenses     Satisfied with contacts:  They're better than the others.     Good comfort:  They took a bit to get used to, but it's gotten better  Clear vision:     It's ok, there is glare in left > right  at night and on electronics. Seems to notice it more with his left eye    Haley Miller Optometric Assistant           Medical, surgical and family histories reviewed and updated 10/11/2018.       OBJECTIVE: See Ophthalmology exam    ASSESSMENT:    ICD-10-CM    1. Myopia of both eyes H52.13    2. Regular astigmatism of both eyes H52.223    3. Presbyopia H52.4       PLAN:    Patient Instructions   Patient was advised of today's exam findings.  Made changes to both RGP's to minimize glare  Order RGP,  and wear at least 2 hours before contact check appointment in 1-2 weeks  Clinic needs to return RGP's for credit.      Khadijah Rose O.D.  [facility]   [address] [place] [address] [phone]                       Again, thank you for allowing me to participate in the care of your patient.        Sincerely,        Khadijah Rose, OD

## 2018-10-12 ASSESSMENT — REFRACTION_CURRENTRX
OD_BASECURVE: 7.85
OD_AXIS: 085
OS_AXIS: 065
OS_SPHERE: -9.75
OD_SPHERE: -10.00
OD_AXIS: 085
OD_SPHERE: -9.25
OS_AXIS: 065
OS_CYLINDER: -1.25
OS_DIAMETER: 9.5
OS_SPHERE: -10.12
OS_CYLINDER: -1.25
OS_DIAMETER: 9.5
OS_BASECURVE: 7.74

## 2018-10-12 ASSESSMENT — VISUAL ACUITY
VA_OR_OS_CURRENT_RX: 20-2
VA_OR_OD_CURRENT_RX: 20/50

## 2018-10-12 NOTE — PATIENT INSTRUCTIONS
Patient was advised of today's exam findings.  Made changes to both RGP's to minimize glare  Order RGP,  and wear at least 2 hours before contact check appointment in 1-2 weeks  Clinic needs to return RGP's for credit.      Khadijah Rose O.D.  [facility]   [address] [place], [place] [id number] [phone]

## 2018-10-16 ENCOUNTER — OFFICE VISIT (OUTPATIENT)
Dept: FAMILY MEDICINE | Facility: CLINIC | Age: 43
End: 2018-10-16
Payer: COMMERCIAL

## 2018-10-16 VITALS
TEMPERATURE: 97.9 F | HEART RATE: 86 BPM | SYSTOLIC BLOOD PRESSURE: 120 MMHG | DIASTOLIC BLOOD PRESSURE: 85 MMHG | HEIGHT: 72 IN | WEIGHT: 315 LBS | RESPIRATION RATE: 16 BRPM | BODY MASS INDEX: 42.66 KG/M2 | OXYGEN SATURATION: 97 %

## 2018-10-16 DIAGNOSIS — R06.83 SNORING: ICD-10-CM

## 2018-10-16 LAB — HBA1C MFR BLD: 8 % (ref 0–5.6)

## 2018-10-16 PROCEDURE — 36415 COLL VENOUS BLD VENIPUNCTURE: CPT | Performed by: FAMILY MEDICINE

## 2018-10-16 PROCEDURE — 83036 HEMOGLOBIN GLYCOSYLATED A1C: CPT | Performed by: FAMILY MEDICINE

## 2018-10-16 PROCEDURE — 99207 C PAF COMPLETED  NO CHARGE: CPT | Mod: 25 | Performed by: FAMILY MEDICINE

## 2018-10-16 PROCEDURE — 99214 OFFICE O/P EST MOD 30 MIN: CPT | Performed by: FAMILY MEDICINE

## 2018-10-16 RX ORDER — LISINOPRIL 2.5 MG/1
2.5 TABLET ORAL DAILY
Qty: 90 TABLET | Refills: 0 | Status: SHIPPED | OUTPATIENT
Start: 2018-10-16 | End: 2019-04-01

## 2018-10-16 ASSESSMENT — PAIN SCALES - GENERAL: PAINLEVEL: NO PAIN (0)

## 2018-10-16 NOTE — MR AVS SNAPSHOT
After Visit Summary   10/16/2018    Norberto Doty    MRN: [id number]           Patient Information     Date Of Birth          1975        Visit Information        Provider Department      10/16/2018 8:00 AM Triston Hills MD [facility]        Today's Diagnoses     Uncontrolled diabetes mellitus type 2 without complications (H)    -  1    Uncontrolled type 2 diabetes mellitus without complication, without long-term current use of insulin (H)        Snoring           Follow-ups after your visit        Additional Services     SLEEP EVALUATION & MANAGEMENT REFERRAL - [facility]  [phone] (Age 15 and up)       Please be aware that coverage of these services is subject to the terms and limitations of your health insurance plan.  Call member services at your health plan with any benefit or coverage questions.      Please bring the following to your appointment:    >>   List of current medications   >>   This referral request   >>   Any documents/labs given to you for this referral                      Follow-up notes from your care team     Return in about 3 months (around 1/16/2019).      Future tests that were ordered for you today     Open Future Orders        Priority Expected Expires Ordered    SLEEP EVALUATION & MANAGEMENT REFERRAL - [facility]  [phone] (Age 15 and up) Routine  10/16/2019 10/16/2018            Who to contact     If you have questions or need follow up information about today's clinic visit or your schedule please contact [facility] directly at [phone].  Normal or non-critical lab and imaging results will be communicated to you by MyChart, letter or phone within 4 business days after the clinic has received the results. If you do not hear from us within 7 days, please contact the clinic through MyChart or phone. If you have a critical or abnormal lab result, we  will notify you by phone as soon as possible.  Submit refill requests through [employer] or call your pharmacy and they will forward the refill request to us. Please allow 3 business days for your refill to be completed.          Additional Information About Your Visit        Care EveryWhere ID     This is your Care EveryWhere ID. This could be used by other organizations to access your [facility] medical records  [id number]        Your Vitals Were     Pulse Temperature Respirations Height Pulse Oximetry BMI (Body Mass Index)    86 97.9  F (36.6  C) (Oral) 16 6' (1.829 m) 97% 43.54 kg/m2       Blood Pressure from Last 3 Encounters:   10/16/18 120/85   07/24/18 128/79   05/22/18 124/81    Weight from Last 3 Encounters:   10/16/18 321 lb (145.6 kg)   07/24/18 319 lb (144.7 kg)   05/22/18 303 lb (137.4 kg)              We Performed the Following     Hemoglobin A1c     PAF COMPLETED          Today's Medication Changes          These changes are accurate as of 10/16/18  8:49 AM.  If you have any questions, ask your nurse or doctor.               These medicines have changed or have updated prescriptions.        Dose/Directions    dulaglutide 1.5 MG/0.5ML pen   Commonly known as:  TRULICITY   This may have changed:  Another medication with the same name was removed. Continue taking this medication, and follow the directions you see here.   Used for:  Uncontrolled type 2 diabetes mellitus without complication, without long-term current use of insulin (H)        Dose:  1.5 mg   Inject 1.5 mg Subcutaneous every 7 days   Quantity:  2 mL   Refills:  3            Where to get your medicines      These medications were sent to [facility] [address] [place] [address] [address] [facility]  [address] [facility] [address]     Phone:  [phone]     lisinopril 2.5 MG tablet                Primary Care Provider Office Phone # Fax #    Triston Hills [phone] [address] [facility] [address]         Equal Access to Services     [facility] : Hadii aad ku hademilianogabo Cortes, watatoda luqadaha, qaybta nasbillymagy crawford. So [facility] [phone].    ATENCIÓN: Si habla español, tiene a hall disposición servicios gratuitos de asistencia lingüística. Llame al [phone].    We comply with applicable federal civil rights laws and [place] laws. We do not discriminate on the basis of race, color, national origin, age, disability, sex, sexual orientation, or gender identity.            Thank you!     [facility] you for [facility]  [facility]. Our goal is always to provide you with excellent care. Hearing back from our patients is one way we can continue to improve our services. Please take a few minutes to complete the written survey that you may receive in the mail after your visit with us. Thank you!             Your Updated Medication List - Protect others around you: Learn how to safely use, store and throw away your medicines at www.disposemymeds.org.          This list is accurate as of 10/16/18  8:49 AM.  Always use your most recent med list.                   Brand Name Dispense Instructions for use Diagnosis    aspirin 325 MG EC tablet     90 tablet    Take 1 tablet (325 mg) by mouth daily    Uncontrolled diabetes mellitus type 2 without complications (H)       atorvastatin 40 MG tablet    LIPITOR    90 tablet    Take 1 tablet (40 mg) by mouth daily    Uncontrolled type 2 diabetes mellitus without complication, without long-term current use of insulin (H)       blood glucose monitoring lancets     102 each    Use to test blood sugar 3 times daily or as directed.  Ok to substitute alternative if insurance prefers.    Uncontrolled type 2 diabetes mellitus without complication, without long-term current use of insulin (H)       blood glucose monitoring test strip    ACCU-CHEK GUIDE    200 strip    Use to test blood sugar 3 times daily or as directed.     Uncontrolled type 2 diabetes mellitus without complication, without long-term current use of insulin (H)       Carboxymeth-Glycerin-Polysorb 0.5-1-0.5 % Soln    REFRESH OPTIVE ADVANCED     Apply 1 drop to eye 2 times daily    Punctate keratitis, bilateral       dulaglutide 1.5 MG/0.5ML pen    TRULICITY    2 mL    Inject 1.5 mg Subcutaneous every 7 days    Uncontrolled type 2 diabetes mellitus without complication, without long-term current use of insulin (H)       insulin pen needle 32G X 4 MM    BD RAMILA U/F    100 each    Use 1 daily as directed.    Uncontrolled type 2 diabetes mellitus without complication, without long-term current use of insulin (H)       lisinopril 2.5 MG tablet    PRINIVIL/Zestril    90 tablet    Take 1 tablet (2.5 mg) by mouth daily    Uncontrolled type 2 diabetes mellitus without complication, without long-term current use of insulin (H)       metFORMIN 500 MG 24 hr tablet    GLUCOPHAGE-XR    360 tablet    Take 2 tablets (1,000 mg) by mouth 2 times daily (with meals)    Uncontrolled type 2 diabetes mellitus without complication, without long-term current use of insulin (H)

## 2018-10-16 NOTE — NURSING NOTE
Chief Complaint   Patient presents with     Diabetes       Initial BP (!) 146/92  Pulse 86  Temp 97.9  F (36.6  C) (Oral)  Resp 16  Ht 6' (1.829 m)  Wt 321 lb (145.6 kg)  SpO2 97%  BMI 43.54 kg/m2 Estimated body mass index is 43.54 kg/(m^2) as calculated from the following:    Height as of this encounter: 6' (1.829 m).    Weight as of this encounter: 321 lb (145.6 kg).  Medication Reconciliation: complete  Luly Gomez M.A.

## 2018-10-16 NOTE — PROGRESS NOTES
SUBJECTIVE:  Norberto Doty presents today for follow up of DIABETES MELLITUS .           Patient glucose self monitoring: one time daily, once daily.  Blood glucose averages: 160  Symptoms of low blood sugar (hypoglycemia)? n  Problems taking medications regularly? n   Side effects? n  What are you doing for exercise outside of work or your daily activities? work  Reviewed health maintenance  Patient Active Problem List   Diagnosis     Punctate keratitis, bilateral     Uncontrolled diabetes mellitus type 2 without complications (H)     Morbid obesity due to excess calories (H)     Morbid obesity (H)       Smokes n  PHQ-2  Over the last two weeks- Have you been bothered by little interest or pleasure in doing things?  No  Over the last two weeks- Have you been been feeling down, depressed, or hopeless?  No    BP:   BP Readings from Last 3 Encounters:   10/16/18 120/85   07/24/18 128/79   05/22/18 124/81       Lab Results   Component Value Date    A1C 8.0 10/16/2018    A1C 9.6 07/24/2018    A1C 12.0 04/17/2018       Recent Labs   Lab Test  07/24/18   0805  08/07/17   0809   CHOL  140  166   HDL  51  48   LDL  62  78   TRIG  136  202*       Wt Readings from Last 3 Encounters:   10/16/18 321 lb (145.6 kg)   07/24/18 319 lb (144.7 kg)   05/22/18 303 lb (137.4 kg)     Asa is included in med list    Current Outpatient Prescriptions   Medication Sig Dispense Refill     aspirin  MG tablet Take 1 tablet (325 mg) by mouth daily 90 tablet 3     atorvastatin (LIPITOR) 40 MG tablet Take 1 tablet (40 mg) by mouth daily 90 tablet 3     blood glucose monitoring (ACCU-CHEK FASTCLIX) lancets Use to test blood sugar 3 times daily or as directed.  Ok to substitute alternative if insurance prefers. 102 each 11     blood glucose monitoring (ACCU-CHEK GUIDE) test strip Use to test blood sugar 3 times daily or as directed. 200 strip 11     Carboxymeth-Glycerin-Polysorb (REFRESH OPTIVE ADVANCED) 0.5-1-0.5 % SOLN Apply 1 drop to eye 2  times daily       dulaglutide (TRULICITY) 1.5 MG/0.5ML pen Inject 1.5 mg Subcutaneous every 7 days 2 mL 3     insulin pen needle (BD RAMILA U/F) 32G X 4 MM Use 1 daily as directed. 100 each 1     lisinopril (PRINIVIL/ZESTRIL) 2.5 MG tablet Take 1 tablet (2.5 mg) by mouth daily 90 tablet 0     metFORMIN (GLUCOPHAGE-XR) 500 MG 24 hr tablet Take 2 tablets (1,000 mg) by mouth 2 times daily (with meals) 360 tablet 1     [DISCONTINUED] lisinopril (PRINIVIL/ZESTRIL) 2.5 MG tablet Take 1 tablet (2.5 mg) by mouth daily 90 tablet 0       Histories reviewed and updated in Epic.        EXAM:  Vitals: /85  Pulse 86  Temp 97.9  F (36.6  C) (Oral)  Resp 16  Ht 6' (1.829 m)  Wt 321 lb (145.6 kg)  SpO2 97%  BMI 43.54 kg/m2  BMIE= Body mass index is 43.54 kg/(m^2).  GENERAL APPEARANCE: alert and no acute distress  PSYCH: mentation appears normal and affect normal/bright  RESP: lungs clear to auscultation - no rales, rhonchi or wheezes  CV: regular rate and rhythm, normal S1 S2, no S3 or S4 and no murmur, click or rub -  EXT: no cyanosis or edema in lower extremities  SKIN: no venous stasis changes    ICD-10-CM    1. Uncontrolled diabetes mellitus type 2 without complications (H) E11.65 PAF COMPLETED     Hemoglobin A1c   2. Uncontrolled type 2 diabetes mellitus without complication, without long-term current use of insulin (H) E11.65 lisinopril (PRINIVIL/ZESTRIL) 2.5 MG tablet   3. Snoring R06.83 SLEEP EVALUATION & MANAGEMENT REFERRAL - [facility]  [phone] (Age 15 and up)    PLAN:Follow up in 3 months diaetician     SUBJECTIVE:  43 year old.The patient has a complaint of snoring.  This started years ago.  quality sometimes stops breathing. ROS no daytime fatigue and no naps      Reviewed health maintenance  Patient Active Problem List   Diagnosis     Punctate keratitis, bilateral     Uncontrolled diabetes mellitus type 2 without complications (H)     Morbid obesity due to excess  calories (H)     Morbid obesity (H)     Past Medical History:   Diagnosis Date     Diabetes (H)        OBJECTIVE:  no apparent distress  /85  Pulse 86  Temp 97.9  F (36.6  C) (Oral)  Resp 16  Ht 6' (1.829 m)  Wt 321 lb (145.6 kg)  SpO2 97%  BMI 43.54 kg/m2    LUNGS:  CTA B/L, no wheezing or crackles.   Cardiovascular: negative, PMI normal. No lifts, heaves, or thrills. RRR. No murmurs, clicks gallops or rub   Gastrointestinal: Abdomen soft, non-tender. BS normal. No masses, organomegaly       ICD-10-CM    1. Uncontrolled diabetes mellitus type 2 without complications (H) E11.65 PAF COMPLETED     Hemoglobin A1c   2. Uncontrolled type 2 diabetes mellitus without complication, without long-term current use of insulin (H) E11.65 lisinopril (PRINIVIL/ZESTRIL) 2.5 MG tablet   3. Snoring R06.83 SLEEP EVALUATION & MANAGEMENT REFERRAL - On license of [facility]  [phone] (Age 15 and up)    PLAN: per sleep

## 2018-11-21 DIAGNOSIS — Z31.41 FERTILITY TESTING: Primary | ICD-10-CM

## 2018-12-11 RX ORDER — DULAGLUTIDE 1.5 MG/.5ML
INJECTION, SOLUTION SUBCUTANEOUS
Qty: 2 ML | Refills: 0 | Status: SHIPPED | OUTPATIENT
Start: 2018-12-11 | End: 2019-01-03

## 2019-01-03 ENCOUNTER — OFFICE VISIT (OUTPATIENT)
Dept: FAMILY MEDICINE | Facility: CLINIC | Age: 44
End: 2019-01-03
Payer: COMMERCIAL

## 2019-01-03 VITALS
SYSTOLIC BLOOD PRESSURE: 124 MMHG | BODY MASS INDEX: 43.67 KG/M2 | OXYGEN SATURATION: 97 % | HEART RATE: 77 BPM | WEIGHT: 315 LBS | DIASTOLIC BLOOD PRESSURE: 80 MMHG | TEMPERATURE: 97.9 F | RESPIRATION RATE: 18 BRPM

## 2019-01-03 DIAGNOSIS — E66.01 MORBID OBESITY DUE TO EXCESS CALORIES (H): ICD-10-CM

## 2019-01-03 LAB — HBA1C MFR BLD: 6.9 % (ref 0–5.6)

## 2019-01-03 PROCEDURE — 36415 COLL VENOUS BLD VENIPUNCTURE: CPT | Performed by: FAMILY MEDICINE

## 2019-01-03 PROCEDURE — 99214 OFFICE O/P EST MOD 30 MIN: CPT | Performed by: FAMILY MEDICINE

## 2019-01-03 PROCEDURE — 83036 HEMOGLOBIN GLYCOSYLATED A1C: CPT | Performed by: FAMILY MEDICINE

## 2019-01-03 RX ORDER — SILDENAFIL CITRATE 20 MG/1
TABLET ORAL
Qty: 50 TABLET | Refills: 3 | Status: SHIPPED | OUTPATIENT
Start: 2019-01-03 | End: 2022-05-12

## 2019-01-03 ASSESSMENT — PAIN SCALES - GENERAL: PAINLEVEL: NO PAIN (0)

## 2019-01-03 NOTE — PROGRESS NOTES
SUBJECTIVE:  Norberto Doty presents today for follow up of DIABETES MELLITUS .       Patient is also trying to get wife pregnant.  He has problems with achieving a full erection and does not always have ejaculation.  He has not tried ED medication and is considering this  Patient glucose self monitoring: one time daily, once daily.  Blood glucose averages: 130-160  Symptoms of low blood sugar (hypoglycemia)? n  Problems taking medications regularly? n   Side effects? n  What are you doing for exercise outside of work or your daily activities? Working out  Reviewed health maintenance  Patient Active Problem List   Diagnosis     Punctate keratitis, bilateral     Uncontrolled diabetes mellitus type 2 without complications (H)     Morbid obesity due to excess calories (H)     Morbid obesity (H)       Smokes n  PHQ-2  Over the last two weeks- Have you been bothered by little interest or pleasure in doing things?  No  Over the last two weeks- Have you been been feeling down, depressed, or hopeless?  No    BP:   BP Readings from Last 3 Encounters:   01/03/19 124/80   10/16/18 120/85   07/24/18 128/79       Lab Results   Component Value Date    A1C 8.0 10/16/2018    A1C 9.6 07/24/2018    A1C 12.0 04/17/2018       Recent Labs   Lab Test 07/24/18  0805 08/07/17  0809   CHOL 140 166   HDL 51 48   LDL 62 78   TRIG 136 202*       Wt Readings from Last 3 Encounters:   01/03/19 146.1 kg (322 lb)   10/16/18 145.6 kg (321 lb)   07/24/18 144.7 kg (319 lb)     Asa is included in med list    Current Outpatient Medications   Medication Sig Dispense Refill     aspirin  MG tablet Take 1 tablet (325 mg) by mouth daily 90 tablet 3     atorvastatin (LIPITOR) 40 MG tablet Take 1 tablet (40 mg) by mouth daily 90 tablet 3     blood glucose monitoring (ACCU-CHEK FASTCLIX) lancets Use to test blood sugar 3 times daily or as directed.  Ok to substitute alternative if insurance prefers. 102 each 11     blood glucose monitoring (ACCU-CHEK  GUIDE) test strip Use to test blood sugar 3 times daily or as directed. 200 strip 11     Carboxymeth-Glycerin-Polysorb (REFRESH OPTIVE ADVANCED) 0.5-1-0.5 % SOLN Apply 1 drop to eye 2 times daily       dulaglutide (TRULICITY) 1.5 MG/0.5ML pen Inject 1.5 mg Subcutaneous every 7 days 2 mL 0     insulin pen needle (BD RAMILA U/F) 32G X 4 MM Use 1 daily as directed. 100 each 1     lisinopril (PRINIVIL/ZESTRIL) 2.5 MG tablet Take 1 tablet (2.5 mg) by mouth daily 90 tablet 0     metFORMIN (GLUCOPHAGE-XR) 500 MG 24 hr tablet Take 2 tablets (1,000 mg) by mouth 2 times daily (with meals) 360 tablet 1     sildenafil (REVATIO) 20 MG tablet May take 1-5 pills one hour before intercourse 50 tablet 3       Histories reviewed and updated in Epic.      EXAM:  Vitals: /80   Pulse 77   Temp 97.9  F (36.6  C) (Oral)   Resp 18   Wt 146.1 kg (322 lb)   SpO2 97%   BMI 43.67 kg/m    BMIE= Body mass index is 43.67 kg/m .  GENERAL APPEARANCE: alert and no acute distress  PSYCH: mentation appears normal and affect normal/bright  RESP: lungs clear to auscultation - no rales, rhonchi or wheezes  CV: regular rate and rhythm, normal S1 S2, no S3 or S4 and no murmur, click or rub -  EXT: no cyanosis or edema in lower extremities  SKIN: no venous stasis changes    ICD-10-CM    1. Uncontrolled diabetes mellitus type 2 without complications (H) E11.65    2. Morbid obesity due to excess calories (H) E66.01    3. Uncontrolled type 2 diabetes mellitus without complication, without long-term current use of insulin (H) E11.65 Hemoglobin A1c     dulaglutide (TRULICITY) 1.5 MG/0.5ML pen     sildenafil (REVATIO) 20 MG tablet    PLAN:discussed generic Viagra    Follow up in 6 months

## 2019-01-03 NOTE — NURSING NOTE
Chief Complaint   Patient presents with     Erectile Dysfunction     Diabetes       Initial /80   Pulse 77   Temp 97.9  F (36.6  C) (Oral)   Resp 18   Wt 146.1 kg (322 lb)   SpO2 97%   BMI 43.67 kg/m   Estimated body mass index is 43.67 kg/m  as calculated from the following:    Height as of 10/16/18: 1.829 m (6').    Weight as of this encounter: 146.1 kg (322 lb).  Medication Reconciliation: complete  Luly Gomez M.A.

## 2019-01-15 ENCOUNTER — TELEPHONE (OUTPATIENT)
Dept: FAMILY MEDICINE | Facility: CLINIC | Age: 44
End: 2019-01-15

## 2019-01-15 NOTE — TELEPHONE ENCOUNTER
"  Norberto Doty is a 43 year old male  who calls with abdominal pain.  5:30am loose stools began and pt reports diarrhea every 30 minutes since that time.  6:30am abdominal pain started. Pain is described as varying between #7-10/10.   Pt has chills but has not checked temperature, pt encouraged to check temp and states at this time it is 98.1.  Pt states he left work early due to severity of sx and frequency of BM.  No out of the country travel recently, [place] via airplane for [employer], and travel to [place] recently.    NURSING ASSESSMENT:  The pain began today.  Pain scale (0-10): 7-10/10  The pain is described as \"severe\"  Symptom associated with the abdominal pain: nausea, diarrhea, chills and sweats.  Pain is aggravated by standing and sitting up, and relieved by laying down.  Allergies: No Known Allergies    NURSING PLAN: Nursing advice to patient needs to be seen at ED for evaluation of \"severe\" abdominal pain, and if pain is not severe then pt needs to be seen in clinic today based on diarrhea every 30-60 min for > 6 hours.    RECOMMENDED DISPOSITION:  To ED/UC for evaluation, another person to drive - ED if pain is severe.  Will comply with recommendation: Yes and Pt states he would consider pain severe and will go to ED.    Guideline used:  Telephone Triage Protocols for Nurses, Fifth Edition, Kenyatta Vaughn (Diarrhea, Adult).    Ana Weaver RN      "

## 2019-01-29 RX ORDER — DULAGLUTIDE 1.5 MG/.5ML
INJECTION, SOLUTION SUBCUTANEOUS
Qty: 4 ML | Refills: 1 | Status: SHIPPED | OUTPATIENT
Start: 2019-01-29 | End: 2019-05-14

## 2019-02-20 ENCOUNTER — TELEPHONE (OUTPATIENT)
Dept: FAMILY MEDICINE | Facility: CLINIC | Age: 44
End: 2019-02-20

## 2019-02-20 NOTE — TELEPHONE ENCOUNTER
Panel Management Review      Patient has the following on his problem list:     Diabetes    ASA: Passed    Last A1C  Lab Results   Component Value Date    A1C 6.9 01/03/2019    A1C 8.0 10/16/2018    A1C 9.6 07/24/2018    A1C 12.0 04/17/2018    A1C 9.4 07/31/2017     A1C tested: Passed    Last LDL:    Lab Results   Component Value Date    CHOL 140 07/24/2018     Lab Results   Component Value Date    HDL 51 07/24/2018     Lab Results   Component Value Date    LDL 62 07/24/2018     Lab Results   Component Value Date    TRIG 136 07/24/2018     No results found for: CHOLHDLRATIO  Lab Results   Component Value Date    NHDL 89 07/24/2018       Is the patient on a Statin? YES             Is the patient on Aspirin? YES    Medications     HMG CoA Reductase Inhibitors     atorvastatin (LIPITOR) 40 MG tablet       Salicylates     aspirin  MG tablet             Last three blood pressure readings:  BP Readings from Last 3 Encounters:   01/03/19 124/80   10/16/18 120/85   07/24/18 128/79       Date of last diabetes office visit: 01/30/2019     Tobacco History:     History   Smoking Status     Never Smoker   Smokeless Tobacco     Never Used           Composite cancer screening  Chart review shows that this patient is due/due soon for the following NoneNone  Summary:    Patient is due/failing the following:   None     Action needed:   Was just seen     Type of outreach:    none    Questions for provider review:    None                                                                                                                                    Luly Gomez M.A.       Chart routed to n/a .

## 2019-04-02 NOTE — TELEPHONE ENCOUNTER
Routing refill request to provider for review/approval because:  Labs not current:  Per RN Refill parameters pt is overdue for recheck microalbumin. Future order pended.  Please confirm okay to refill until next office visit due 7/2019 and recheck microalbumin at that time.      Requested Prescriptions   Pending Prescriptions Disp Refills     metFORMIN (GLUCOPHAGE-XR) 500 MG 24 hr tablet [Pharmacy Med Name: MetFORMIN ER 500MG  TAB] 360 tablet 0     Sig: TAKE 2 TABLETS BY MOUTH TWICE DAILY WITH MEALS    Biguanide Agents Failed - 4/1/2019  1:08 PM       Failed - Patient has had a Microalbumin in the past 15 mos.    Recent Labs   Lab Test 07/31/17  1645   MICROL 49   UMALCR 33.40*        Ana Weaver RN

## 2019-04-03 RX ORDER — LISINOPRIL 2.5 MG/1
TABLET ORAL
Qty: 90 TABLET | Refills: 0 | Status: SHIPPED | OUTPATIENT
Start: 2019-04-03 | End: 2019-07-02

## 2019-04-03 RX ORDER — METFORMIN HCL 500 MG
TABLET, EXTENDED RELEASE 24 HR ORAL
Qty: 360 TABLET | Refills: 0 | Status: SHIPPED | OUTPATIENT
Start: 2019-04-03 | End: 2019-07-02

## 2019-05-16 RX ORDER — ATORVASTATIN CALCIUM 40 MG/1
TABLET, FILM COATED ORAL
Qty: 90 TABLET | Refills: 0 | Status: SHIPPED | OUTPATIENT
Start: 2019-05-16 | End: 2019-07-02

## 2019-05-16 RX ORDER — DULAGLUTIDE 1.5 MG/.5ML
INJECTION, SOLUTION SUBCUTANEOUS
Qty: 12 ML | Refills: 1 | Status: SHIPPED | OUTPATIENT
Start: 2019-05-16 | End: 2020-01-08

## 2019-05-16 NOTE — TELEPHONE ENCOUNTER
Routing refill request to provider for review/approval because:  Labs not current:  Micro albumin.  Thalia Clarke BSN, RN

## 2019-07-02 ENCOUNTER — OFFICE VISIT (OUTPATIENT)
Dept: FAMILY MEDICINE | Facility: CLINIC | Age: 44
End: 2019-07-02
Payer: COMMERCIAL

## 2019-07-02 VITALS
HEART RATE: 72 BPM | WEIGHT: 315 LBS | TEMPERATURE: 98 F | DIASTOLIC BLOOD PRESSURE: 77 MMHG | BODY MASS INDEX: 42.66 KG/M2 | RESPIRATION RATE: 18 BRPM | OXYGEN SATURATION: 98 % | HEIGHT: 72 IN | SYSTOLIC BLOOD PRESSURE: 136 MMHG

## 2019-07-02 LAB
ANION GAP SERPL CALCULATED.3IONS-SCNC: 7 MMOL/L (ref 3–14)
BUN SERPL-MCNC: 11 MG/DL (ref 7–30)
CALCIUM SERPL-MCNC: 9.1 MG/DL (ref 8.5–10.1)
CHLORIDE SERPL-SCNC: 106 MMOL/L (ref 94–109)
CHOLEST SERPL-MCNC: 98 MG/DL
CO2 SERPL-SCNC: 26 MMOL/L (ref 20–32)
CREAT SERPL-MCNC: 0.74 MG/DL (ref 0.66–1.25)
CREAT UR-MCNC: 221 MG/DL
GFR SERPL CREATININE-BSD FRML MDRD: >90 ML/MIN/{1.73_M2}
GLUCOSE SERPL-MCNC: 131 MG/DL (ref 70–99)
HBA1C MFR BLD: 6.2 % (ref 0–5.6)
HDLC SERPL-MCNC: 49 MG/DL
LDLC SERPL CALC-MCNC: 31 MG/DL
MICROALBUMIN UR-MCNC: 17 MG/L
MICROALBUMIN/CREAT UR: 7.56 MG/G CR (ref 0–17)
NONHDLC SERPL-MCNC: 49 MG/DL
POTASSIUM SERPL-SCNC: 4 MMOL/L (ref 3.4–5.3)
SODIUM SERPL-SCNC: 139 MMOL/L (ref 133–144)
TRIGL SERPL-MCNC: 91 MG/DL

## 2019-07-02 PROCEDURE — 80061 LIPID PANEL: CPT | Performed by: FAMILY MEDICINE

## 2019-07-02 PROCEDURE — 83036 HEMOGLOBIN GLYCOSYLATED A1C: CPT | Performed by: FAMILY MEDICINE

## 2019-07-02 PROCEDURE — 36415 COLL VENOUS BLD VENIPUNCTURE: CPT | Performed by: FAMILY MEDICINE

## 2019-07-02 PROCEDURE — 99214 OFFICE O/P EST MOD 30 MIN: CPT | Performed by: FAMILY MEDICINE

## 2019-07-02 PROCEDURE — 82043 UR ALBUMIN QUANTITATIVE: CPT | Performed by: FAMILY MEDICINE

## 2019-07-02 PROCEDURE — 80048 BASIC METABOLIC PNL TOTAL CA: CPT | Performed by: FAMILY MEDICINE

## 2019-07-02 RX ORDER — ATORVASTATIN CALCIUM 40 MG/1
40 TABLET, FILM COATED ORAL DAILY
Qty: 90 TABLET | Refills: 0 | Status: SHIPPED | OUTPATIENT
Start: 2019-07-02 | End: 2019-10-25

## 2019-07-02 RX ORDER — LISINOPRIL 2.5 MG/1
2.5 TABLET ORAL DAILY
Qty: 90 TABLET | Refills: 1 | Status: SHIPPED | OUTPATIENT
Start: 2019-07-02 | End: 2020-01-08

## 2019-07-02 RX ORDER — METFORMIN HCL 500 MG
TABLET, EXTENDED RELEASE 24 HR ORAL
Qty: 360 TABLET | Refills: 1 | Status: SHIPPED | OUTPATIENT
Start: 2019-07-02 | End: 2020-01-08

## 2019-07-02 ASSESSMENT — MIFFLIN-ST. JEOR: SCORE: 2393.58

## 2019-07-02 ASSESSMENT — PAIN SCALES - GENERAL: PAINLEVEL: NO PAIN (0)

## 2019-07-02 NOTE — PROGRESS NOTES
SUBJECTIVE:  Norberto Doty presents today for follow up of DIABETES MELLITUS .       Patient concerns: diarrhea     Patient glucose self monitoring: one time daily, once daily.  Blood glucose averages: 120-140  Symptoms of low blood sugar (hypoglycemia)? n  Problems taking medications regularly? y   Side effects? n  What are you doing for exercise outside of work or your daily activities? Work only  Reviewed health maintenance  Patient Active Problem List   Diagnosis     Punctate keratitis, bilateral     Uncontrolled diabetes mellitus type 2 without complications (H)     Morbid obesity due to excess calories (H)     Morbid obesity (H)       Smokes n  PHQ-2  Over the last two weeks- Have you been bothered by little interest or pleasure in doing things?  No  Over the last two weeks- Have you been been feeling down, depressed, or hopeless?  No    BP:   BP Readings from Last 3 Encounters:   07/02/19 136/77   01/03/19 124/80   10/16/18 120/85       Lab Results   Component Value Date    A1C 6.2 07/02/2019    A1C 6.9 01/03/2019    A1C 8.0 10/16/2018       Recent Labs   Lab Test 07/24/18  0805 08/07/17  0809   CHOL 140 166   HDL 51 48   LDL 62 78   TRIG 136 202*       Wt Readings from Last 3 Encounters:   07/02/19 146.1 kg (322 lb)   01/03/19 146.1 kg (322 lb)   10/16/18 145.6 kg (321 lb)     Asa is included in med list    Current Outpatient Medications   Medication Sig Dispense Refill     aspirin  MG tablet Take 1 tablet (325 mg) by mouth daily 90 tablet 3     atorvastatin (LIPITOR) 40 MG tablet TAKE 1 TABLET BY MOUTH ONCE DAILY 90 tablet 0     blood glucose monitoring (ACCU-CHEK FASTCLIX) lancets Use to test blood sugar 3 times daily or as directed.  Ok to substitute alternative if insurance prefers. 102 each 11     blood glucose monitoring (ACCU-CHEK GUIDE) test strip Use to test blood sugar 3 times daily or as directed. 200 strip 11     Carboxymeth-Glycerin-Polysorb (REFRESH OPTIVE ADVANCED) 0.5-1-0.5 % SOLN  Apply 1 drop to eye 2 times daily       insulin pen needle (BD RAMILA U/F) 32G X 4 MM Use 1 daily as directed. 100 each 1     lisinopril (PRINIVIL/ZESTRIL) 2.5 MG tablet TAKE 1 TABLET BY MOUTH ONCE DAILY 90 tablet 0     metFORMIN (GLUCOPHAGE-XR) 500 MG 24 hr tablet TAKE 2 TABLETS BY MOUTH TWICE DAILY WITH MEALS 360 tablet 0     metFORMIN (GLUCOPHAGE-XR) 500 MG 24 hr tablet Take 2 tablets (1,000 mg) by mouth 2 times daily (with meals) 360 tablet 1     sildenafil (REVATIO) 20 MG tablet May take 1-5 pills one hour before intercourse 50 tablet 3     TRULICITY 1.5 MG/0.5ML pen INJECT 1.5MG SUBCUTANEOUSLY EVERY 7 DAYS 12 mL 1       Histories reviewed and updated in Epic.    EXAM:  Vitals: /77   Pulse 72   Temp 98  F (36.7  C) (Oral)   Resp 18   Ht 1.829 m (6')   Wt 146.1 kg (322 lb)   SpO2 98%   BMI 43.67 kg/m    BMIE= Body mass index is 43.67 kg/m .  GENERAL APPEARANCE: alert and no acute distress  PSYCH: mentation appears normal and affect normal/bright  RESP: lungs clear to auscultation - no rales, rhonchi or wheezes  CV: regular rate and rhythm, normal S1 S2, no S3 or S4 and no murmur, click or rub -  EXT: no cyanosis or edema in lower extremities  SKIN: no venous stasis changes    ICD-10-CM    1. Uncontrolled diabetes mellitus type 2 without complications (H) E11.65 Hemoglobin A1c     Lipid panel reflex to direct LDL Fasting     **Basic metabolic panel FUTURE 1yr     **Basic metabolic panel FUTURE 1yr   2. Uncontrolled type 2 diabetes mellitus without complication, without long-term current use of insulin (H) E11.65 Albumin Random Urine Quantitative with Creat Ratio    PLAN:Follow up in 6 months try three time per day for the metformin and see if diarrhea is less

## 2019-07-02 NOTE — NURSING NOTE
Chief Complaint   Patient presents with     Diabetes       Initial /77   Pulse 72   Temp 98  F (36.7  C) (Oral)   Resp 18   Ht 1.829 m (6')   Wt 146.1 kg (322 lb)   SpO2 98%   BMI 43.67 kg/m   Estimated body mass index is 43.67 kg/m  as calculated from the following:    Height as of this encounter: 1.829 m (6').    Weight as of this encounter: 146.1 kg (322 lb).  Medication Reconciliation: complete  Luly Gomez M.A.

## 2019-09-01 ENCOUNTER — TRANSFERRED RECORDS (OUTPATIENT)
Dept: HEALTH INFORMATION MANAGEMENT | Facility: CLINIC | Age: 44
End: 2019-09-01

## 2019-09-01 ENCOUNTER — NURSE TRIAGE (OUTPATIENT)
Dept: NURSING | Facility: CLINIC | Age: 44
End: 2019-09-01

## 2019-09-01 NOTE — TELEPHONE ENCOUNTER
"Left shoulder pain started yesterday. Pain radiates to his elbow. He was awakened by the pain at 5 a.m.. He finally was able to quiet it down by taking two ibuprofen and putting an ice pack on it. He was able to sleep after that.  Per the protocol I instructed he be seen in an ER, now. He agreed and said his wife will drive him to [facility].    Reason for Disposition    [1] Age > 40 AND [2] no obvious cause AND [3] pain even when not moving the arm    (Exception: pain is clearly made worse by moving arm or bending neck)    Additional Information    Negative: Passed out (i.e., lost consciousness, collapsed and was not responding)    Negative: Shock suspected (e.g., cold/pale/clammy skin, too weak to stand, low BP, rapid pulse)    Negative: [1] Similar pain previously AND [2] it was from \"heart attack\"    Negative: [1] Similar pain previously AND [2] it was from \"angina\" AND [3] not relieved by nitroglycerin    Negative: Sounds like a life-threatening emergency to the triager    Negative: Difficulty breathing or unusual sweating (e.g., sweating without exertion)    Negative: [1] Pain lasting > 5 minutes AND [2] pain also present in chest  (Exception: pain is clearly made worse by movement)    Protocols used: SHOULDER PAIN-ZOFIA-KATHERYN CARR RN [facility]       "

## 2019-09-04 ENCOUNTER — TELEPHONE (OUTPATIENT)
Dept: FAMILY MEDICINE | Facility: CLINIC | Age: 44
End: 2019-09-04

## 2019-09-04 NOTE — TELEPHONE ENCOUNTER
"Patient states his BS has been elevated past 2 mornings; 212 and 204.  He denies any symptoms but concerned because his bs is usually always in 120's in a.m.  Only checks it once daily.  He is only taking his metformin 500mg ONE tab BID instead of two tabs bid.  States has been doing this long term and his A1c's are \"always good\".  He denies any symptoms of hyperglycemia. No excessive thirst or urination, no vision concerns, no headaches, no lethargy/confusion.  Continues the trulicity weekly.  States no change in diet; did go to [place] and BS did elevate slightly at that time due to all the \"good food\".  TO NOTE:  ED started him on prednisone 60mg daily x5 days.  Started med on 9/2/19 for his shoulder pain.  (shoulder feeling better)    I did let him know that prednisone can cause elevations in blood sugars.  He already increased his metformin this morning to 1000mg bid.  Wondering if should continue this and if so how long?  This is day 3 of 5 on the prednisone.    Please advise.  Mesha Nicole RN     "

## 2019-09-04 NOTE — TELEPHONE ENCOUNTER
Please sign verbal order agreement as per below.      Per verbal order read back Dr. [place]iston [place]:  It is most likely the prednisone that is causing the elevated BS.  Continue to take the metformin 1000mg bid until the blood sugars return to normal.  OR:  May just continue the 1000mg bid.  BS should return to normal when done with the prednisone.  Mesha Nicole RN  I did speak with patient.  He verbalized understanding of instructions.  Will call if any more concerns with diabetes.  Mesha Nicole RN

## 2019-09-05 ENCOUNTER — TRANSFERRED RECORDS (OUTPATIENT)
Dept: HEALTH INFORMATION MANAGEMENT | Facility: CLINIC | Age: 44
End: 2019-09-05

## 2019-09-05 PROBLEM — A41.9 SEPSIS (H): Status: RESOLVED | Noted: 2019-01-15 | Resolved: 2019-09-05

## 2019-09-05 PROBLEM — E66.01 MORBID OBESITY DUE TO EXCESS CALORIES (H): Chronic | Status: ACTIVE | Noted: 2017-07-31

## 2019-09-05 PROBLEM — E78.2 MIXED HYPERLIPIDEMIA: Status: ACTIVE | Noted: 2019-01-15

## 2019-09-05 PROBLEM — E78.2 MIXED HYPERLIPIDEMIA: Chronic | Status: ACTIVE | Noted: 2019-01-15

## 2019-09-05 PROBLEM — A41.9 SEPSIS (H): Status: ACTIVE | Noted: 2019-01-15

## 2019-09-06 ENCOUNTER — OFFICE VISIT (OUTPATIENT)
Dept: SLEEP MEDICINE | Facility: CLINIC | Age: 44
End: 2019-09-06
Attending: FAMILY MEDICINE
Payer: COMMERCIAL

## 2019-09-06 VITALS
SYSTOLIC BLOOD PRESSURE: 136 MMHG | BODY MASS INDEX: 42.66 KG/M2 | DIASTOLIC BLOOD PRESSURE: 86 MMHG | HEIGHT: 72 IN | HEART RATE: 74 BPM | OXYGEN SATURATION: 96 % | WEIGHT: 315 LBS

## 2019-09-06 DIAGNOSIS — R53.81 MALAISE AND FATIGUE: ICD-10-CM

## 2019-09-06 DIAGNOSIS — E66.01 CLASS 3 SEVERE OBESITY DUE TO EXCESS CALORIES WITH BODY MASS INDEX (BMI) OF 40.0 TO 44.9 IN ADULT, UNSPECIFIED WHETHER SERIOUS COMORBIDITY PRESENT (H): ICD-10-CM

## 2019-09-06 DIAGNOSIS — R06.83 SNORING: ICD-10-CM

## 2019-09-06 DIAGNOSIS — E66.813 CLASS 3 SEVERE OBESITY DUE TO EXCESS CALORIES WITH BODY MASS INDEX (BMI) OF 40.0 TO 44.9 IN ADULT, UNSPECIFIED WHETHER SERIOUS COMORBIDITY PRESENT (H): ICD-10-CM

## 2019-09-06 DIAGNOSIS — R53.83 MALAISE AND FATIGUE: ICD-10-CM

## 2019-09-06 DIAGNOSIS — G47.30 SLEEP APNEA, UNSPECIFIED TYPE: ICD-10-CM

## 2019-09-06 DIAGNOSIS — R06.00 DYSPNEA AND RESPIRATORY ABNORMALITY: Primary | ICD-10-CM

## 2019-09-06 DIAGNOSIS — Z72.820 LACK OF ADEQUATE SLEEP: ICD-10-CM

## 2019-09-06 DIAGNOSIS — R06.89 DYSPNEA AND RESPIRATORY ABNORMALITY: Primary | ICD-10-CM

## 2019-09-06 PROBLEM — I10 ESSENTIAL HYPERTENSION: Chronic | Status: ACTIVE | Noted: 2019-09-06

## 2019-09-06 PROBLEM — I10 ESSENTIAL HYPERTENSION: Status: ACTIVE | Noted: 2019-09-06

## 2019-09-06 PROBLEM — R31.9 HEMATURIA: Status: ACTIVE | Noted: 2019-01-16

## 2019-09-06 PROBLEM — R31.9 HEMATURIA: Status: RESOLVED | Noted: 2019-01-16 | Resolved: 2019-09-06

## 2019-09-06 PROBLEM — K80.00 ACUTE CALCULOUS CHOLECYSTITIS: Status: RESOLVED | Noted: 2019-01-16 | Resolved: 2019-09-06

## 2019-09-06 PROBLEM — K80.00 ACUTE CALCULOUS CHOLECYSTITIS: Status: ACTIVE | Noted: 2019-01-16

## 2019-09-06 PROCEDURE — 99214 OFFICE O/P EST MOD 30 MIN: CPT | Performed by: PHYSICIAN ASSISTANT

## 2019-09-06 RX ORDER — PREDNISONE 20 MG/1
60 TABLET ORAL
COMMUNITY
Start: 2019-09-02 | End: 2019-09-07

## 2019-09-06 RX ORDER — IBUPROFEN 600 MG/1
TABLET, FILM COATED ORAL
Refills: 0 | COMMUNITY
Start: 2019-09-01 | End: 2022-05-12

## 2019-09-06 ASSESSMENT — MIFFLIN-ST. JEOR: SCORE: 2370.9

## 2019-09-06 NOTE — NURSING NOTE
Chief Complaint   Patient presents with     Sleep Problem     My wife wants me too        Initial /86   Pulse 74   Ht 1.829 m (6')   Wt 143.8 kg (317 lb)   SpO2 96%   BMI 42.99 kg/m   Estimated body mass index is 42.99 kg/m  as calculated from the following:    Height as of this encounter: 1.829 m (6').    Weight as of this encounter: 143.8 kg (317 lb).    Medication Reconciliation: complete    Neck circumference: 21 inches / 53.5 centimeters.

## 2019-09-06 NOTE — PATIENT INSTRUCTIONS
Your BMI is Body mass index is 42.99 kg/m .  Weight management is a personal decision.  If you are interested in exploring weight loss strategies, the following discussion covers the approaches that may be successful. Body mass index (BMI) is one way to tell whether you are at a healthy weight, overweight, or obese. It measures your weight in relation to your height.  A BMI of 18.5 to 24.9 is in the healthy range. A person with a BMI of 25 to 29.9 is considered overweight, and someone with a BMI of 30 or greater is considered obese. More than two-thirds of American adults are considered overweight or obese.  Being overweight or obese increases the risk for further weight gain. Excess weight may lead to heart disease and diabetes.  Creating and following plans for healthy eating and physical activity may help you improve your health.  Weight control is part of healthy lifestyle and includes exercise, emotional health, and healthy eating habits. Careful eating habits lifelong are the mainstay of weight control. Though there are significant health benefits from weight loss, long-term weight loss with diet alone may be very difficult to achieve- studies show long-term success with dietary management in less than 10% of people. Attaining a healthy weight may be especially difficult to achieve in those with severe obesity. In some cases, medications, devices and surgical management might be considered.  What can you do?  If you are overweight or obese and are interested in methods for weight loss, you should discuss this with your provider.     Consider reducing daily calorie intake by 500 calories.     Keep a food journal.     Avoiding skipping meals, consider cutting portions instead.    Diet combined with exercise helps maintain muscle while optimizing fat loss. Strength training is particularly important for building and maintaining muscle mass. Exercise helps reduce stress, increase energy, and improves fitness.  Increasing exercise without diet control, however, may not burn enough calories to loose weight.       Start walking three days a week 10-20 minutes at a time    Work towards walking thirty minutes five days a week     Eventually, increase the speed of your walking for 1-2 minutes at time    In addition, we recommend that you review healthy lifestyles and methods for weight loss available through the National Institutes of Health patient information sites:  http://win.niddk.nih.gov/publications/index.htm    And look into health and wellness programs that may be available through your health insurance provider, employer, local community center, or [employer] club.    Weight management plan: Patient was referred to their PCP to discuss a diet and exercise plan.

## 2019-09-06 NOTE — PROGRESS NOTES
Sleep Consultation:    Date on this visit: 9/6/2019    Norberto Doty is sent by [facility] for a sleep consultation regarding snoring.    Primary Physician: [facility]     Chief Complaint   Patient presents with     Sleep Problem     My wife wants me to.       Norberto goes to sleep at 10:00 PM during the week. He wakes up at 6:00 AM with an alarm. He falls asleep in 5 minutes.  Norberto denies difficulty falling asleep.  He wakes up 0-1 times a night for 5 minutes before falling back to sleep.  Norberto wakes up to go to the bathroom.  On weekends, Norberto goes to sleep at 12:00 AM.  He wakes up at 8:00 AM without an alarm. He falls asleep in 5 minutes.  Patient gets 6-7 hours of sleep per night.     Patient does use electronics in bed and watch TV in bed.     Norberto does not do shift work.      Norberto does snore every night and snoring is very loud. Patient does have a regular bed partner. There is not report of kicking and punching.  He does have witnessed apneas. They never sleep separately.  Patient sleeps on his back, side and stomach. He denies morning headaches, morning confusion and restless legs. Norberto denies any bruxism, sleep walking, sleep talking, dream enactment, sleep paralysis, cataplexy and hypnogogic/hypnopompic hallucinations.    Norberto denies difficulty breathing through his nose, claustrophobia and reflux at night.      Norberto has gained 0-5 pounds in the last year.  Patient describes themself as a morning person.  He would prefer to go to sleep at 10:00 PM and wake up at 7:00 AM.  Patient's Penrose Sleepiness score 10/24 consistent with some daytime sleepiness.      Norberto does not nap. He takes some inadvertant naps.  He denies falling asleep while driving. Patient was counseled on the importance of driving while alert, to pull over if drowsy, or nap before getting into the vehicle if sleepy.  He uses 1-2 cups/day of coffee. Last caffeine intake is usually before noon.    Allergies:    No Known  Allergies    Medications:    Current Outpatient Medications   Medication Sig Dispense Refill     aspirin  MG tablet Take 1 tablet (325 mg) by mouth daily 90 tablet 3     atorvastatin (LIPITOR) 40 MG tablet Take 1 tablet (40 mg) by mouth daily 90 tablet 0     blood glucose monitoring (ACCU-CHEK FASTCLIX) lancets Use to test blood sugar 3 times daily or as directed.  Ok to substitute alternative if insurance prefers. 102 each 11     blood glucose monitoring (ACCU-CHEK GUIDE) test strip Use to test blood sugar 3 times daily or as directed. 200 strip 11     Carboxymeth-Glycerin-Polysorb (REFRESH OPTIVE ADVANCED) 0.5-1-0.5 % SOLN Apply 1 drop to eye 2 times daily       ibuprofen (ADVIL/MOTRIN) 600 MG tablet TAKE 1 TABLET BY MOUTH THREE TIMES DAILY WITH MEALS FOR 10 DAYS . MAXIMUM OF 3200 MG IN 24 HOURS.  0     insulin pen needle (BD RAMILA U/F) 32G X 4 MM Use 1 daily as directed. 100 each 1     lisinopril (PRINIVIL/ZESTRIL) 2.5 MG tablet Take 1 tablet (2.5 mg) by mouth daily 90 tablet 1     metFORMIN (GLUCOPHAGE-XR) 500 MG 24 hr tablet TAKE 2 TABLETS BY MOUTH TWICE DAILY WITH MEALS 360 tablet 1     predniSONE (DELTASONE) 20 MG tablet Take 60 mg by mouth       sildenafil (REVATIO) 20 MG tablet May take 1-5 pills one hour before intercourse 50 tablet 3     TRULICITY 1.5 MG/0.5ML pen INJECT 1.5MG SUBCUTANEOUSLY EVERY 7 DAYS 12 mL 1       Problem List:  Patient Active Problem List    Diagnosis Date Noted     Essential hypertension 09/06/2019     Priority: Medium     Mixed hyperlipidemia 01/15/2019     Priority: Medium     Morbid obesity due to excess calories (H) 07/31/2017     Priority: Medium     Uncontrolled diabetes mellitus type 2 without complications (H) 06/02/2016     Priority: Medium     Punctate keratitis, bilateral 04/21/2016     Priority: Medium        Past Medical/Surgical History:  Past Medical History:   Diagnosis Date     Acute calculous cholecystitis 1/16/2019     Essential hypertension 9/6/2019      Hematuria 1/16/2019     Sepsis (H) 1/15/2019     Past Surgical History:   Procedure Laterality Date     NO HISTORY OF SURGERY         Social History:  Social History     Socioeconomic History     Marital status:      Spouse name: Not on file     Number of children: Not on file     Years of education: Not on file     Highest education level: Not on file   Occupational History     Not on file   Social Needs     Financial resource strain: Not on file     Food insecurity:     Worry: Not on file     Inability: Not on file     Transportation needs:     Medical: Not on file     Non-medical: Not on file   Tobacco Use     Smoking status: Never Smoker     Smokeless tobacco: Never Used   Substance and Sexual Activity     Alcohol use: Yes     Alcohol/week: 0.0 oz     Comment: OCC     Drug use: No     Sexual activity: Yes     Partners: Female     Birth control/protection: Condom   Lifestyle     Physical activity:     Days per week: Not on file     Minutes per session: Not on file     Stress: Not on file   Relationships     Social connections:     Talks on phone: Not on file     Gets together: Not on file     Attends [employer] service: Not on file     Active member of club or organization: Not on file     Attends meetings of clubs or organizations: Not on file     Relationship status: Not on file     Intimate partner violence:     Fear of current or ex partner: Not on file     Emotionally abused: Not on file     Physically abused: Not on file     Forced sexual activity: Not on file   Other Topics Concern     Parent/sibling w/ CABG, MI or angioplasty before 65F 55M? Not Asked      Service No     Blood Transfusions No     Caffeine Concern No     Occupational Exposure No     Hobby Hazards No     Sleep Concern No     Stress Concern No     Weight Concern No     Special Diet No     Back Care No     Exercise Yes     Bike Helmet Yes     Seat Belt Yes     Self-Exams Yes   Social History Narrative     Not on file        Family History:  Family History   Problem Relation Age of Onset     Diabetes Maternal Aunt      Cancer No family hx of      Hypertension No family hx of      Cerebrovascular Disease No family hx of      Thyroid Disease No family hx of      Glaucoma No family hx of      Macular Degeneration No family hx of        Review of Systems:  A complete review of systems reviewed by me is negative with the exeption of what has been mentioned in the history of present illness.  CONSTITUTIONAL: NEGATIVE for weight gain/loss, fever, chills, sweats or night sweats, drug allergies.  EYES: NEGATIVE for changes in vision, blind spots, double vision.  ENT: NEGATIVE for ear pain, sore throat, sinus pain, post-nasal drip, runny nose, bloody nose  CARDIAC: NEGATIVE for fast heartbeats or fluttering in chest, chest pain or pressure, breathlessness when lying flat, swollen legs or swollen feet.  NEUROLOGIC: NEGATIVE headaches, weakness or numbness in the arms or legs.  DERMATOLOGIC: NEGATIVE for rashes, new moles or change in mole(s)  PULMONARY:  POSITIVE for  productive cough and NEGATIVE for  SOB at rest, SOB with activity and wheezing   GASTROINTESTINAL: NEGATIVE for nausea or vomitting, loose or watery stools, fat or grease in stools, constipation, abdominal pain, bowel movements black in color or blood noted.  GENITOURINARY:  POSITIVE for  urinating more frequently than usual and NEGATIVE for  pain during urination  MUSCULOSKELETAL: NEGATIVE for muscle pain, bone or joint pain, swollen joints.  ENDOCRINE: NEGATIVE for increased thirst or urination, diabetes.  LYMPHATIC: NEGATIVE for swollen lymph nodes, lumps or bumps in the breasts or nipple discharge.    Physical Examination:  Vitals: /86   Pulse 74   Ht 1.829 m (6')   Wt 143.8 kg (317 lb)   SpO2 96%   BMI 42.99 kg/m    BMI= Body mass index is 42.99 kg/m .    Neck Cir (cm): 54 cm    Glendo Total Score 9/6/2019   Total score - Glendo 10       RALF Total Score: 10  (09/06/19 1500)    GENERAL APPEARANCE: alert and no distress  EYES: Eyes grossly normal to inspection, PERRL and conjunctivae and sclerae normal  HENT: ear canals and TM's normal, nose and mouth without ulcers or lesions, oropharynx crowded and tongue base enlarged  NECK: no asymmetry, masses, or scars  RESP: lungs clear to auscultation - no rales, rhonchi or wheezes  CV: regular rates and rhythm and normal S1 S2, no S3 or S4  MS: extremities normal- no gross deformities noted  NEURO: Normal strength and tone, mentation intact and speech normal  PSYCH: mentation appears normal and affect normal/bright  Mallampati Class: IV.  Tonsillar Stage: Not visualized.  Last Comprehensive Metabolic Panel:  Sodium   Date Value Ref Range Status   07/02/2019 139 133 - 144 mmol/L Final     Potassium   Date Value Ref Range Status   07/02/2019 4.0 3.4 - 5.3 mmol/L Final     Chloride   Date Value Ref Range Status   07/02/2019 106 94 - 109 mmol/L Final     Carbon Dioxide   Date Value Ref Range Status   07/02/2019 26 20 - 32 mmol/L Final     Anion Gap   Date Value Ref Range Status   07/02/2019 7 3 - 14 mmol/L Final     Glucose   Date Value Ref Range Status   07/02/2019 131 (H) 70 - 99 mg/dL Final     Comment:     Fasting specimen     Urea Nitrogen   Date Value Ref Range Status   07/02/2019 11 7 - 30 mg/dL Final     Creatinine   Date Value Ref Range Status   07/02/2019 0.74 0.66 - 1.25 mg/dL Final     GFR Estimate   Date Value Ref Range Status   07/02/2019 >90 >60 mL/min/[1.73_m2] Final     Comment:     Non  GFR Calc  Starting 12/18/2018, serum creatinine based estimated GFR (eGFR) will be   calculated using the Chronic Kidney Disease Epidemiology Collaboration   (CKD-EPI) equation.       Calcium   Date Value Ref Range Status   07/02/2019 9.1 8.5 - 10.1 mg/dL Final     TSH   Date Value Ref Range Status   07/24/2018 2.86 0.40 - 4.00 mU/L Final     Impression:  Patient has features and risk factors for possible obstructive  "sleep apnea including: BMI of 42.99,  loud snoring, witnessed apnea, daytime sleepiness (ESS 10), difficulty maintaining sleep, crowded oropharynx, large neck circumference of 21\" and co-morbid DM II . The STOP-BANG score is 6/8.     Plan:    1. Schedule a Home Sleep Apnea Testing to evaluate for obstructive sleep apnea.    2. Advised him against drowsy driving.    3. Recommend weight management.     Literature provided regarding sleep apnea.      He will follow up with me in approximately one day after his sleep study has been competed to review the results and discuss plan of care.       Home Sleep Apnea Testing reviewed.  Obstructive sleep apnea reviewed.  Complications of untreated sleep apnea were reviewed.    Gabriel Harman PA-C    CC: [facility]        "

## 2019-09-12 ENCOUNTER — OFFICE VISIT (OUTPATIENT)
Dept: SLEEP MEDICINE | Facility: CLINIC | Age: 44
End: 2019-09-12
Payer: COMMERCIAL

## 2019-09-12 DIAGNOSIS — R53.81 MALAISE AND FATIGUE: ICD-10-CM

## 2019-09-12 DIAGNOSIS — R06.00 DYSPNEA AND RESPIRATORY ABNORMALITY: ICD-10-CM

## 2019-09-12 DIAGNOSIS — E66.01 CLASS 3 SEVERE OBESITY DUE TO EXCESS CALORIES WITH BODY MASS INDEX (BMI) OF 40.0 TO 44.9 IN ADULT, UNSPECIFIED WHETHER SERIOUS COMORBIDITY PRESENT (H): ICD-10-CM

## 2019-09-12 DIAGNOSIS — E66.813 CLASS 3 SEVERE OBESITY DUE TO EXCESS CALORIES WITH BODY MASS INDEX (BMI) OF 40.0 TO 44.9 IN ADULT, UNSPECIFIED WHETHER SERIOUS COMORBIDITY PRESENT (H): ICD-10-CM

## 2019-09-12 DIAGNOSIS — Z72.820 LACK OF ADEQUATE SLEEP: ICD-10-CM

## 2019-09-12 DIAGNOSIS — R06.83 SNORING: Primary | ICD-10-CM

## 2019-09-12 DIAGNOSIS — R53.83 MALAISE AND FATIGUE: ICD-10-CM

## 2019-09-12 DIAGNOSIS — G47.30 SLEEP APNEA, UNSPECIFIED TYPE: ICD-10-CM

## 2019-09-12 DIAGNOSIS — R06.89 DYSPNEA AND RESPIRATORY ABNORMALITY: ICD-10-CM

## 2019-09-12 PROCEDURE — G0399 HOME SLEEP TEST/TYPE 3 PORTA: HCPCS | Performed by: INTERNAL MEDICINE

## 2019-09-12 NOTE — PROGRESS NOTES
Pt is completing a home sleep test. Pt was instructed on how to put on the Noxturnal T3 device and associated equipment before going to bed and given the opportunity to practice putting it on before leaving the sleep center. Pt was reminded to bring the home sleep test kit back to the center tomorrow, at agreed upon time for download and reporting.     Lyric Benitez MA on 9/12/2019 at 3:26 PM

## 2019-09-13 ENCOUNTER — DOCUMENTATION ONLY (OUTPATIENT)
Dept: SLEEP MEDICINE | Facility: CLINIC | Age: 44
End: 2019-09-13
Payer: COMMERCIAL

## 2019-09-13 NOTE — PROGRESS NOTES
This HSAT was performed using a Noxturnal T3 device which recorded snore, sound, movement activity, body position, nasal pressure, oronasal thermal airflow, pulse, oximetry and both chest and abdominal respiratory effort. HSAT data was restricted to the time patient states they were in bed.     HSAT was scored using 1B 4% hypopnea rule.     HST AHI (Non-PAT): 44.1  Snoring was reported as moderate.  Time with SpO2 below 89% was 57.9 minutes.   Overall signal quality was good     Pt will follow up with sleep provider to determine appropriate therapy.

## 2019-09-16 PROBLEM — E11.9 TYPE 2 DIABETES MELLITUS (H): Chronic | Status: ACTIVE | Noted: 2019-09-16

## 2019-09-16 NOTE — PROCEDURES
HOME SLEEP STUDY INTERPRETATION    Patient: Norberto Doty  MRN: [id number]  YOB: 1975  Study Date: 2019  Referring Provider: [facility]  Ordering Provider: VANESSA Ma     Indications for Home Study: Norberto Doty is a 44 year old male with symptoms suggestive of obstructive sleep apnea.    Estimated body mass index is 42.99 kg/m  as calculated from the following:    Height as of 19: 1.829 m (6').    Weight as of 19: 143.8 kg (317 lb).  Total score - [place]: 10 (2019  3:00 PM)  STOP-BAN/8    Data: A full night home sleep study was performed recording the standard physiologic parameters including body position, movement, sound, nasal pressure, thermal oral airflow, chest and abdominal movements with respiratory inductance plethysmography, and oxygen saturation by pulse oximetry. Pulse rate was estimated by oximetry recording. This study was considered adequate based on > 4 hours of quality oximetry and respiratory recording. As specified by the AASM Manual for the Scoring of Sleep and Associated events, version 2.3, Rule VIII.D 1B, 4% oxygen desaturation scoring for hypopneas is used as a standard of care on all home sleep apnea testing.    Analysis Time:  410 minutes    Respiration:   Sleep Associated Hypoxemia: episodic hypoxemia was present. Baseline oxygen saturation was 91%.  Time with saturation less than or equal to 88% was 57 minutes. The lowest oxygen saturation was 77%.   Snoring: Snoring was present.  Respiratory events: The home study revealed a presence of 127 obstructive apneas and 37 mixed and central apneas. There were 138 hypopneas resulting in a combined apnea/hypopnea index [AHI] of 44.1 events per hour.  AHI was 52.9 per hour supine, n/a per hour prone, 88.4 per hour on left side, and 47.1 per hour on right side.   Pattern: Excluding events noted above, respiratory rate and pattern was Normal.    Position: Percent of time spent: supine - 63%,  prone - 0%, on left - 1%, on right - 13%.    Heart Rate: By pulse oximetry normal rate was noted.     Assessment:   Severe obstructive sleep apnea.  Sleep associated hypoxemia was present.    Recommendations:  Consider auto-CPAP at 5-18 cmH2O, oral appliance therapy, polysomnography with full night PAP titration or surgical options.  Suggest optimizing sleep hygiene and avoiding sleep deprivation.  Weight management.    Diagnosis Code(s): Obstructive Sleep Apnea G47.33, Hypoxemia G47.36    Fabian Neville MD, September 16, 2019   Diplomate, American Board of Internal Medicine, Sleep Medicine

## 2019-09-30 ENCOUNTER — OFFICE VISIT (OUTPATIENT)
Dept: SLEEP MEDICINE | Facility: CLINIC | Age: 44
End: 2019-09-30
Payer: COMMERCIAL

## 2019-09-30 VITALS
HEART RATE: 78 BPM | BODY MASS INDEX: 42.66 KG/M2 | WEIGHT: 315 LBS | HEIGHT: 72 IN | OXYGEN SATURATION: 97 % | SYSTOLIC BLOOD PRESSURE: 127 MMHG | DIASTOLIC BLOOD PRESSURE: 86 MMHG

## 2019-09-30 DIAGNOSIS — G47.33 OSA (OBSTRUCTIVE SLEEP APNEA): Primary | ICD-10-CM

## 2019-09-30 PROCEDURE — 99214 OFFICE O/P EST MOD 30 MIN: CPT | Performed by: PHYSICIAN ASSISTANT

## 2019-09-30 ASSESSMENT — MIFFLIN-ST. JEOR: SCORE: 2379.51

## 2019-09-30 NOTE — PATIENT INSTRUCTIONS
Your BMI is Body mass index is 43.4 kg/m .  Weight management is a personal decision.  If you are interested in exploring weight loss strategies, the following discussion covers the approaches that may be successful. Body mass index (BMI) is one way to tell whether you are at a healthy weight, overweight, or obese. It measures your weight in relation to your height.  A BMI of 18.5 to 24.9 is in the healthy range. A person with a BMI of 25 to 29.9 is considered overweight, and someone with a BMI of 30 or greater is considered obese. More than two-thirds of American adults are considered overweight or obese.  Being overweight or obese increases the risk for further weight gain. Excess weight may lead to heart disease and diabetes.  Creating and following plans for healthy eating and physical activity may help you improve your health.  Weight control is part of healthy lifestyle and includes exercise, emotional health, and healthy eating habits. Careful eating habits lifelong are the mainstay of weight control. Though there are significant health benefits from weight loss, long-term weight loss with diet alone may be very difficult to achieve- studies show long-term success with dietary management in less than 10% of people. Attaining a healthy weight may be especially difficult to achieve in those with severe obesity. In some cases, medications, devices and surgical management might be considered.  What can you do?  If you are overweight or obese and are interested in methods for weight loss, you should discuss this with your provider.     Consider reducing daily calorie intake by 500 calories.     Keep a food journal.     Avoiding skipping meals, consider cutting portions instead.    Diet combined with exercise helps maintain muscle while optimizing fat loss. Strength training is particularly important for building and maintaining muscle mass. Exercise helps reduce stress, increase energy, and improves fitness.  Increasing exercise without diet control, however, may not burn enough calories to loose weight.       Start walking three days a week 10-20 minutes at a time    Work towards walking thirty minutes five days a week     Eventually, increase the speed of your walking for 1-2 minutes at time    In addition, we recommend that you review healthy lifestyles and methods for weight loss available through the National Institutes of Health patient information sites:  http://win.niddk.nih.gov/publications/index.htm    And look into health and wellness programs that may be available through your health insurance provider, employer, local community center, or sukumar club.    Weight management plan: Patient was referred to their PCP to discuss a diet and exercise plan.

## 2019-09-30 NOTE — NURSING NOTE
Chief Complaint   Patient presents with     Study Results       Initial BP (!) 141/93   Pulse 78   Ht 1.829 m (6')   Wt 145.2 kg (320 lb)   SpO2 97%   BMI 43.40 kg/m   Estimated body mass index is 43.4 kg/m  as calculated from the following:    Height as of this encounter: 1.829 m (6').    Weight as of this encounter: 145.2 kg (320 lb).    Medication Reconciliation: complete

## 2019-09-30 NOTE — PROGRESS NOTES
"Home Sleep Apnea Testing Results Visit:    Chief Complaint   Patient presents with     Study Results       Norberto Doty is a 44 year old male who returns to [facility] after having had Home Sleep Apnea Testing.  He presented with loud snoring, witnessed apnea, daytime sleepiness (ESS 10), difficulty maintaining sleep, crowded oropharynx, large neck circumference of 21\" and co-morbid DM II . The STOP-BANG score is 6/8.      Home Sleep Apnea Testing - 9/12/19: 317 lbs 0 oz: AHI 44.1/hr. Supine AHI 52.9/hr.   Oxygen Scooby of 77%.  Baseline 91.5%.  Sp02 =< 88% for 57.9 minutes  He slept on his back (63.1%), prone (0.3%), left (1.2) and right (13.4%) sides.   Analysis time: 410.5 minutes.     Signal quality of Oxymeter 95% Good  Nasal Cannula 100% Good  RIP belts 100% Good.     Norberto Doty reports that he slept Fair.     Home Sleep Apnea Testing was reviewed in detail today with Norberto and a copy given to him for his records.    Past medical/surgical history, family history, social history, medications and allergies were reviewed.      /86   Pulse 78   Ht 1.829 m (6')   Wt 145.2 kg (320 lb)   SpO2 97%   BMI 43.40 kg/m      Impression/Plan:  Severe Obstructive Sleep Apnea.   Sleep associated hypoxemia was present.     Treatment options discussed today including  auto-CPAP at 6-15 cmH2O, oral appliance therapy, positional therapy, polysomnography with full night PAP titration or surgical options.    Elected treatment with auto-CPAP at 6-15 cmH2O.    25 minutes spent with patient with >50% spent in counseling and coordination of care regarding DENISE.      Gabriel Harman PA-C        "

## 2019-10-09 ENCOUNTER — DOCUMENTATION ONLY (OUTPATIENT)
Dept: SLEEP MEDICINE | Facility: CLINIC | Age: 44
End: 2019-10-09
Payer: COMMERCIAL

## 2019-10-09 NOTE — PROGRESS NOTES
Patient was offered choice of vendor and chose [facility].  Norberto Doty was set up at [place] on October 9, 2019 Patient received a Resmed AirSense 10 Auto. Pressures were set at 6-15 cm H2O.   Patient s ramp is 5 cm H2O for Auto and FLEX/EPR is EPR, 2.  Patient received a Resmed Mask name: Airfit P10 Pillow mask Size Medium, heated tubing and heated humidifier.  Patient is enrolled in the STM Program and does not need to meet compliance. Patient has a follow up on 12/13/19 with VANESSA Ma.    Sasha Deal

## 2019-10-14 ENCOUNTER — DOCUMENTATION ONLY (OUTPATIENT)
Dept: SLEEP MEDICINE | Facility: CLINIC | Age: 44
End: 2019-10-14

## 2019-10-14 NOTE — PROGRESS NOTES
3 DAY STM VISIT    Diagnostic AHI:  44.1 HST    Patient contacted for 3 day STM visit  Subjective measures:  Pt states that he's struggling with it.  He sometimes feels like he panics with the pressure. I changed his response to soft and changed his EPR to 3 to see if that helps.  He is also going to try shutting the machine off and turning it back on in the middle of the night to see if it helps him be able to fall back asleep.     Replacement device: No     Device type: Auto-CPAP  PAP settings from order::  CPAP min 6 cm  H20       CPAP max 15 cm  H20  Mask type:    Nasal Mask     Device settings from machine      Min CPAP 6.0            Max CPAP 15.0      Assessment: Nightly usage, most nights under four hours.  Action plan: Pt to have f/u 14 day [facility] visit.  Patient has a follow up visit scheduled:   yes within 31-90 days of set up.    Total time spent on remote patient monitoring data analysis and patient contact today:   14 minutes

## 2019-10-24 ENCOUNTER — DOCUMENTATION ONLY (OUTPATIENT)
Dept: SLEEP MEDICINE | Facility: CLINIC | Age: 44
End: 2019-10-24

## 2019-10-24 NOTE — PROGRESS NOTES
14  DAY STM VISIT    Diagnostic AHI:  44.1 HST    Subjective measures:  .Pt states that he's not used it a few nights because he falls asleep in the chair without it.  He thinks that he gets up in the middle of the night and takes it off and just goes back to sleep.  He thinks that he is getting used to it but needs more time.     Assessment: Pt not meeting objective benchmarks for compliance  Patient meeting subjective benchmarks.     Action plan: pt to have 30 day STM visit.      Device type: Auto-CPAP    PAP settings: CPAP min 6.0 cm  H20       CPAP max 15.0 cm  H20      95th% pressure 11.6 cm  H20     Mask type:  Nasal Mask    Objective measures: 14 day rolling measures      Compliance  7 %      Leak  8.73 lpm  last  upload      AHI 1.45   last  upload      Average number of minutes 128      Objective measure goal  Compliance   Goal >70%  Leak   Goal < 24 lpm  AHI  Goal < 5  Usage  Goal >240      Total time spent on remote patient monitoring data analysis and patient contact today:   15 minutes

## 2019-10-25 RX ORDER — ATORVASTATIN CALCIUM 40 MG/1
40 TABLET, FILM COATED ORAL DAILY
Qty: 90 TABLET | Refills: 1 | Status: SHIPPED | OUTPATIENT
Start: 2019-10-25 | End: 2020-01-08

## 2019-10-25 RX ORDER — ATORVASTATIN CALCIUM 40 MG/1
TABLET, FILM COATED ORAL
Qty: 90 TABLET | Refills: 0 | OUTPATIENT
Start: 2019-10-25

## 2019-10-25 RX ORDER — METFORMIN HCL 500 MG
TABLET, EXTENDED RELEASE 24 HR ORAL
Qty: 360 TABLET | Refills: 1 | OUTPATIENT
Start: 2019-10-25

## 2019-10-25 RX ORDER — LISINOPRIL 2.5 MG/1
TABLET ORAL
Qty: 90 TABLET | Refills: 1 | OUTPATIENT
Start: 2019-10-25

## 2019-10-25 NOTE — TELEPHONE ENCOUNTER
[facility] states patient needs a refill on Atorvastatin.  Patient is out of this medication. Thank You.

## 2019-10-25 NOTE — TELEPHONE ENCOUNTER
"Prescription approved per [facility] Refill Protocol.    Requested Prescriptions   Pending Prescriptions Disp Refills     atorvastatin (LIPITOR) 40 MG tablet 90 tablet 0     Sig: Take 1 tablet (40 mg) by mouth daily       [facility] Protocol Passed - 10/25/2019 11:10 AM        Passed - LDL on file in past 12 months     Recent Labs   Lab Test 07/02/19  0812   LDL 31           Passed - No abnormal creatine kinase in past 12 months     No lab results found.         Passed - Recent (12 mo) or future (30 days) visit within the authorizing provider's specialty     Patient has had an office visit with the authorizing provider or a provider within the authorizing providers department within the previous 12 mos or has a future within next 30 days. See \"Patient Info\" tab in inbasket, or \"Choose Columns\" in Meds & Orders section of the refill encounter.            Passed - Medication is active on med list        Passed - Patient is age 18 or older        MADELYN Logan, RN    "

## 2019-10-29 RX ORDER — BLOOD SUGAR DIAGNOSTIC
STRIP MISCELLANEOUS
Qty: 300 STRIP | Refills: 0 | Status: SHIPPED | OUTPATIENT
Start: 2019-10-29 | End: 2020-01-09

## 2019-11-01 ENCOUNTER — NURSE TRIAGE (OUTPATIENT)
Dept: PEDIATRICS | Facility: CLINIC | Age: 44
End: 2019-11-01

## 2019-11-01 NOTE — TELEPHONE ENCOUNTER
Patient calling about what to use with Cpap eden for daily cleaning.  Reviewed resources to find wipes that have no alcohol.      Encouraged patient to call Sleep Medicine /DME office on Monday with any further quesitons    Ana Lorenzo RN on 11/1/2019 at 5:53 PM    Reason for Disposition    Health Information question, no triage required and triager able to answer question    Protocols used: INFORMATION ONLY CALL-A-

## 2019-11-05 DIAGNOSIS — G47.33 OSA (OBSTRUCTIVE SLEEP APNEA): Primary | ICD-10-CM

## 2019-11-11 ENCOUNTER — DOCUMENTATION ONLY (OUTPATIENT)
Dept: SLEEP MEDICINE | Facility: CLINIC | Age: 44
End: 2019-11-11
Payer: COMMERCIAL

## 2019-11-11 NOTE — PROGRESS NOTES
30 DAY STM VISIT    Diagnostic AHI:  44.1 HST    Subjective measures:   Pt states that he's still struggling with having something on his face.  He hasn't been able to use it the last couple of days because he had his wisdom teeth out.     Assessment: Pt not meeting objective benchmarks for compliance  Patient failing following subjective benchmarks: mask discomfort   Action plan: 2 week STM recheck appt scheduled  Patient has scheduled a follow up visit with VANESSA Ma on 12/13/19.   Device type: Auto-CPAP  PAP settings: CPAP min 6.0 cm  H20     CPAP max 15.0 cm  H20    95th% pressure 11.8 cm  H20   Mask type:  Nasal Pillows  Objective measures: 14 day rolling measures      Compliance  35 %      Leak  10.47 lpm  last  upload      AHI 1.79   last  upload      Average number of minutes 180      Objective measure goal  Compliance   Goal >70%  Leak   Goal < 24 lpm  AHI  Goal < 5  Usage  Goal >240      Total time spent on remote patient monitoring data analysis and patient contact today:   13 minutes      Total contact/remote patient monitoring for last 30 days:   42 min

## 2019-11-26 ENCOUNTER — DOCUMENTATION ONLY (OUTPATIENT)
Dept: SLEEP MEDICINE | Facility: CLINIC | Age: 44
End: 2019-11-26

## 2019-11-26 NOTE — PROGRESS NOTES
[facility] Recheck Visit     Message left for patient to return call     Assessment: Pt not meeting objective benchmarks for compliance     Action plan: waiting for patient to return call.  Patient has a follow up visit with VANESSA Ma on 12/13/19.   Device type: Auto-CPAP  PAP settings: CPAP min 6 cm  H20     CPAP max 15 cm  H20    95th% pressure 11.4 cm  H20   Objective measures: 14 day rolling measures      Compliance  28 %      Leak  14.04 lpm  last  upload      AHI 2.21   last  upload      Average number of minutes 162    Diagnostic AHI:  44.1    Objective measure goal  Compliance   Goal >70%  Leak   Goal < 10%  AHI  Goal < 5  Usage  Goal >240

## 2019-12-13 ENCOUNTER — OFFICE VISIT (OUTPATIENT)
Dept: SLEEP MEDICINE | Facility: CLINIC | Age: 44
End: 2019-12-13
Payer: COMMERCIAL

## 2019-12-13 VITALS
DIASTOLIC BLOOD PRESSURE: 85 MMHG | WEIGHT: 315 LBS | HEIGHT: 72 IN | OXYGEN SATURATION: 97 % | HEART RATE: 83 BPM | SYSTOLIC BLOOD PRESSURE: 141 MMHG | BODY MASS INDEX: 42.66 KG/M2

## 2019-12-13 DIAGNOSIS — G47.33 OSA (OBSTRUCTIVE SLEEP APNEA): Primary | ICD-10-CM

## 2019-12-13 PROCEDURE — 99213 OFFICE O/P EST LOW 20 MIN: CPT | Performed by: PHYSICIAN ASSISTANT

## 2019-12-13 ASSESSMENT — MIFFLIN-ST. JEOR: SCORE: 2420.33

## 2019-12-13 NOTE — PATIENT INSTRUCTIONS
Your BMI is Body mass index is 44.62 kg/m .  Weight management is a personal decision.  If you are interested in exploring weight loss strategies, the following discussion covers the approaches that may be successful. Body mass index (BMI) is one way to tell whether you are at a healthy weight, overweight, or obese. It measures your weight in relation to your height.  A BMI of 18.5 to 24.9 is in the healthy range. A person with a BMI of 25 to 29.9 is considered overweight, and someone with a BMI of 30 or greater is considered obese. More than two-thirds of American adults are considered overweight or obese.  Being overweight or obese increases the risk for further weight gain. Excess weight may lead to heart disease and diabetes.  Creating and following plans for healthy eating and physical activity may help you improve your health.  Weight control is part of healthy lifestyle and includes exercise, emotional health, and healthy eating habits. Careful eating habits lifelong are the mainstay of weight control. Though there are significant health benefits from weight loss, long-term weight loss with diet alone may be very difficult to achieve- studies show long-term success with dietary management in less than 10% of people. Attaining a healthy weight may be especially difficult to achieve in those with severe obesity. In some cases, medications, devices and surgical management might be considered.  What can you do?  If you are overweight or obese and are interested in methods for weight loss, you should discuss this with your provider.     Consider reducing daily calorie intake by 500 calories.     Keep a food journal.     Avoiding skipping meals, consider cutting portions instead.    Diet combined with exercise helps maintain muscle while optimizing fat loss. Strength training is particularly important for building and maintaining muscle mass. Exercise helps reduce stress, increase energy, and improves fitness.  Increasing exercise without diet control, however, may not burn enough calories to loose weight.       Start walking three days a week 10-20 minutes at a time    Work towards walking thirty minutes five days a week     Eventually, increase the speed of your walking for 1-2 minutes at time    In addition, we recommend that you review healthy lifestyles and methods for weight loss available through the National Institutes of Health patient information sites:  http://win.niddk.nih.gov/publications/index.htm    And look into health and wellness programs that may be available through your health insurance provider, employer, local community center, or [employer].    Weight management plan: Patient was referred to their PCP to discuss a diet and exercise plan.

## 2019-12-13 NOTE — NURSING NOTE
Chief Complaint   Patient presents with     CPAP Follow Up       Initial BP (!) 141/85   Pulse 83   Ht 1.829 m (6')   Wt 149.2 kg (329 lb)   SpO2 97%   BMI 44.62 kg/m   Estimated body mass index is 44.62 kg/m  as calculated from the following:    Height as of this encounter: 1.829 m (6').    Weight as of this encounter: 149.2 kg (329 lb).    Medication Reconciliation: complete

## 2019-12-13 NOTE — PROGRESS NOTES
"Obstructive Sleep Apnea - PAP Follow-Up Visit:    Chief Complaint   Patient presents with     CPAP Follow Up       Norberto Doty comes in today for follow-up of their severe sleep apnea, managed with CPAP.     He presented with loud snoring, witnessed apnea, daytime sleepiness (ESS 10), difficulty maintaining sleep, crowded oropharynx, large neck circumference of 21\" and co-morbid DM II . The STOP-BANG score is 6/8.       Home Sleep Apnea Testing - 9/12/19: 317 lbs 0 oz: AHI 44.1/hr. Supine AHI 52.9/hr.   Oxygen Scooby of 77%. Baseline 91.5%. Sp02 =< 88% for 57.9 minutes.    October 9, 2019 Patient received a Resmed AirSense 10 Auto. Pressures were set at 6-15 cm H2O.    Overall, he rates the experience with PAP as 8 (0 poor, 10 great). The mask is comfortable.    The mask is not leaking .  He is not snoring with the mask on. He is not having gasp arousals.  He is not having significant oral/nasal dryness. The pressure is comfortable.     His PAP interface is Nasal Pillows.    Bedtime is typically 2200. Usually it takes about 30 min minutes to fall asleep with the mask on. Wake time is typically 0600.  Patient is using PAP therapy 6 hours per night. The patient is usually getting 6 hours of sleep per night.    He does feel rested in the morning.    Total score - [place]: 6 (12/13/2019  2:00 PM)    RALF Total Score: 8    ResMed   Auto-PAP 6.0 - 15.0 cmH2O 30 day usage data:    30% of days with > 4 hours of use. 15/30 days with no use.   Average use 148 minutes per day.   95%ile Leak 14.55 L/min.   CPAP 95% pressure 11.6 cm.   AHI 2.23 events per hour.     Current problems with PAP use include:  1. He does not use it on the weekends.    Past medical/surgical history, family history, social history, medications and allergies were reviewed.      Problem List:  Patient Active Problem List    Diagnosis Date Noted     DENISE (obstructive sleep apnea) 09/30/2019     Priority: Medium     Home Sleep Apnea Testing - 9/12/19: 317 " lbs 0 oz: AHI 44.1/hr. Supine AHI 52.9/hr. Oxygen Scooby of 77%.  Baseline 91.5%.  Sp02 =< 88% for 57.9 minutes.       Type 2 diabetes mellitus (H) 09/16/2019     Priority: Medium     Essential hypertension 09/06/2019     Priority: Medium     Mixed hyperlipidemia 01/15/2019     Priority: Medium     Morbid obesity due to excess calories (H) 07/31/2017     Priority: Medium     Uncontrolled diabetes mellitus type 2 without complications (H) 06/02/2016     Priority: Medium     Punctate keratitis, bilateral 04/21/2016     Priority: Medium        BP (!) 141/85   Pulse 83   Ht 1.829 m (6')   Wt 149.2 kg (329 lb)   SpO2 97%   BMI 44.62 kg/m      Impression/Plan:  Severe obstructive sleep apnea.  Modest CPAP compliance. CPAP of 6-15 is effective.   Patient counseled to use CPAP with all sleep.  Consequences of untreated severe DENISE reviewed.     Norberto Doty will follow up in about 6 month(s).     Fifteen minutes spent with patient, all of which were spent face-to-face counseling, consulting, coordinating plan of care.    Gabriel Harman PA-C

## 2020-01-08 ENCOUNTER — OFFICE VISIT (OUTPATIENT)
Dept: FAMILY MEDICINE | Facility: CLINIC | Age: 45
End: 2020-01-08
Payer: COMMERCIAL

## 2020-01-08 VITALS
SYSTOLIC BLOOD PRESSURE: 129 MMHG | BODY MASS INDEX: 44.67 KG/M2 | OXYGEN SATURATION: 95 % | RESPIRATION RATE: 16 BRPM | HEART RATE: 73 BPM | WEIGHT: 315 LBS | DIASTOLIC BLOOD PRESSURE: 78 MMHG | TEMPERATURE: 98.4 F

## 2020-01-08 DIAGNOSIS — K21.00 GASTROESOPHAGEAL REFLUX DISEASE WITH ESOPHAGITIS: ICD-10-CM

## 2020-01-08 DIAGNOSIS — E11.9 CONTROLLED TYPE 2 DIABETES MELLITUS WITHOUT COMPLICATION, WITHOUT LONG-TERM CURRENT USE OF INSULIN (H): Primary | ICD-10-CM

## 2020-01-08 LAB — HBA1C MFR BLD: 6.9 % (ref 0–5.6)

## 2020-01-08 PROCEDURE — 36415 COLL VENOUS BLD VENIPUNCTURE: CPT | Performed by: FAMILY MEDICINE

## 2020-01-08 PROCEDURE — 99207 C ROUTINE FOOT CARE BY A PHYSICIAN OF A DIABETIC PATIENT WITH DIABETICC SENSORY: CPT | Performed by: FAMILY MEDICINE

## 2020-01-08 PROCEDURE — 99214 OFFICE O/P EST MOD 30 MIN: CPT | Performed by: FAMILY MEDICINE

## 2020-01-08 PROCEDURE — 83036 HEMOGLOBIN GLYCOSYLATED A1C: CPT | Performed by: FAMILY MEDICINE

## 2020-01-08 RX ORDER — LISINOPRIL 2.5 MG/1
2.5 TABLET ORAL DAILY
Qty: 90 TABLET | Refills: 1 | Status: SHIPPED | OUTPATIENT
Start: 2020-01-08 | End: 2020-06-28

## 2020-01-08 RX ORDER — METFORMIN HCL 500 MG
TABLET, EXTENDED RELEASE 24 HR ORAL
Qty: 360 TABLET | Refills: 1 | Status: SHIPPED | OUTPATIENT
Start: 2020-01-08 | End: 2020-06-25

## 2020-01-08 RX ORDER — ATORVASTATIN CALCIUM 40 MG/1
40 TABLET, FILM COATED ORAL DAILY
Qty: 90 TABLET | Refills: 1 | Status: SHIPPED | OUTPATIENT
Start: 2020-01-08 | End: 2020-07-08

## 2020-01-08 NOTE — PROGRESS NOTES
"SUBJECTIVE:  Norberto Doty presents today for follow up of DIABETES MELLITUS .       Patient concerns: gerd  SUBJECTIVE:  44 year old.The patient has a history of gerd for several year.  Symptoms include heartburn. Caffeine intake more than 2/days. Etoh weekends asa or nsaids none smoking none. Better with Zantac.  Worse with red sauce  Nausea n vomiting n. Endoscopy n  Reviewed health maintenance   Patient Active Problem List   Diagnosis     Punctate keratitis, bilateral     Uncontrolled diabetes mellitus type 2 without complications (H)     Morbid obesity due to excess calories (H)     Mixed hyperlipidemia     Essential hypertension     Type 2 diabetes mellitus (H)     DENISE (obstructive sleep apnea)         OBJECTIVE:  Exam:  Constitutional: healthy, alert and no distress  Head: Normocephalic. No masses, lesions, tenderness or abnormalities  Neck: Neck supple. No adenopathy. Thyroid symmetric, normal size,, Carotids without bruits.  ENT: ENT exam normal, no neck nodes or sinus tenderness  Cardiovascular: negative, PMI normal. No lifts, heaves, or thrills. RRR. No murmurs, clicks gallops or rub  Respiratory: negative\",Good diaphragmatic excursion. Lungs clear  Gastrointestinal: Abdomen soft, non-tender. BS normal. No masses, organomegaly         Patient glucose self monitoring: once daily.  Blood glucose averages: 160  Symptoms of low blood sugar (hypoglycemia)? n  Problems taking medications regularly? n   Side effects? n  What are you doing for exercise outside of work or your daily activities? Work only  Reviewed health maintenance  Patient Active Problem List   Diagnosis     Punctate keratitis, bilateral     Uncontrolled diabetes mellitus type 2 without complications (H)     Morbid obesity due to excess calories (H)     Mixed hyperlipidemia     Essential hypertension     Type 2 diabetes mellitus (H)     DENISE (obstructive sleep apnea)       Smokes n  PHQ-2  Over the last two weeks- Have you been bothered by " little interest or pleasure in doing things?  No  Over the last two weeks- Have you been been feeling down, depressed, or hopeless?  No    BP:   BP Readings from Last 3 Encounters:   01/08/20 129/78   12/13/19 (!) 141/85   09/30/19 127/86       Lab Results   Component Value Date    A1C 6.9 01/08/2020    A1C 6.2 07/02/2019    A1C 6.9 01/03/2019       Recent Labs   Lab Test 07/02/19  0812 07/24/18  0805   CHOL 98 140   HDL 49 51   LDL 31 62   TRIG 91 136       Wt Readings from Last 3 Encounters:   01/08/20 149.4 kg (329 lb 6.4 oz)   12/13/19 149.2 kg (329 lb)   09/30/19 145.2 kg (320 lb)     Asa is included in med list    Current Outpatient Medications   Medication Sig Dispense Refill     ACCU-CHEK GUIDE test strip USE  TO TEST BLOOD SUGAR THREE TIMES DAILY OR AS DIRECTED 300 strip 0     aspirin  MG tablet Take 1 tablet (325 mg) by mouth daily 90 tablet 3     atorvastatin (LIPITOR) 40 MG tablet Take 1 tablet (40 mg) by mouth daily 90 tablet 1     blood glucose monitoring (ACCU-CHEK FASTCLIX) lancets Use to test blood sugar 3 times daily or as directed.  Ok to substitute alternative if insurance prefers. 102 each 11     Carboxymeth-Glycerin-Polysorb (REFRESH OPTIVE ADVANCED) 0.5-1-0.5 % SOLN Apply 1 drop to eye 2 times daily       dulaglutide (TRULICITY) 1.5 MG/0.5ML pen Inject 1.5 mg Subcutaneous every 7 days 12 mL 1     ibuprofen (ADVIL/MOTRIN) 600 MG tablet TAKE 1 TABLET BY MOUTH THREE TIMES DAILY WITH MEALS FOR 10 DAYS . MAXIMUM OF 3200 MG IN 24 HOURS.  0     insulin pen needle (BD RAMILA U/F) 32G X 4 MM Use 1 daily as directed. 100 each 1     lisinopril (PRINIVIL/ZESTRIL) 2.5 MG tablet Take 1 tablet (2.5 mg) by mouth daily 90 tablet 1     metFORMIN (GLUCOPHAGE-XR) 500 MG 24 hr tablet TAKE 2 TABLETS BY MOUTH TWICE DAILY WITH MEALS 360 tablet 1     sildenafil (REVATIO) 20 MG tablet May take 1-5 pills one hour before intercourse 50 tablet 3       Histories reviewed and updated in Epic.         EXAM:  Vitals: BP  129/78   Pulse 73   Temp 98.4  F (36.9  C) (Oral)   Resp 16   Wt 149.4 kg (329 lb 6.4 oz)   SpO2 95%   BMI 44.67 kg/m    BMIE= Body mass index is 44.67 kg/m .  GENERAL APPEARANCE: alert and no acute distress  PSYCH: mentation appears normal and affect normal/bright  RESP: lungs clear to auscultation - no rales, rhonchi or wheezes  CV: regular rate and rhythm, normal S1 S2, no S3 or S4 and no murmur, click or rub -  EXT: no cyanosis or edema in lower extremities  SKIN: no venous stasis changes    ICD-10-CM    1. Controlled type 2 diabetes mellitus without complication, without long-term current use of insulin (H) E11.9 Hemoglobin A1c     ROUTINE FOOT CARE BY A PHYSICIAN OF A DIABETIC PATIENT WITH DIABETIC SENSORY   2. Gastroesophageal reflux disease with esophagitis K21.0 GASTROENTEROLOGY ADULT REF PROCEDURE ONLY   3. Uncontrolled type 2 diabetes mellitus without complication, without long-term current use of insulin (H) E11.65 lisinopril (PRINIVIL/ZESTRIL) 2.5 MG tablet     metFORMIN (GLUCOPHAGE-XR) 500 MG 24 hr tablet     dulaglutide (TRULICITY) 1.5 MG/0.5ML pen     atorvastatin (LIPITOR) 40 MG tablet    PLAN:Follow up in 6 months

## 2020-01-09 RX ORDER — BLOOD SUGAR DIAGNOSTIC
STRIP MISCELLANEOUS
Qty: 300 STRIP | Refills: 1 | Status: SHIPPED | OUTPATIENT
Start: 2020-01-09 | End: 2023-02-22

## 2020-01-10 NOTE — TELEPHONE ENCOUNTER
"Requested Prescriptions   Signed Prescriptions Disp Refills    ACCU-CHEK GUIDE test strip 300 strip 1     Sig: USE  THREE TIMES DAILY OR  AS  DIRECTED       Diabetic Supplies Protocol Passed - 1/9/2020  6:32 PM        Passed - Medication is active on med list        Passed - Patient is 18 years of age or older        Passed - Recent (6 mo) or future (30 days) visit within the authorizing provider's specialty     Patient had office visit in the last 6 months or has a visit in the next 30 days with authorizing provider.  See \"Patient Info\" tab in inbasket, or \"Choose Columns\" in Meds & Orders section of the refill encounter.              "

## 2020-01-15 ENCOUNTER — TRANSFERRED RECORDS (OUTPATIENT)
Dept: HEALTH INFORMATION MANAGEMENT | Facility: CLINIC | Age: 45
End: 2020-01-15

## 2020-07-08 ENCOUNTER — OFFICE VISIT (OUTPATIENT)
Dept: FAMILY MEDICINE | Facility: CLINIC | Age: 45
End: 2020-07-08
Payer: COMMERCIAL

## 2020-07-08 VITALS
RESPIRATION RATE: 16 BRPM | OXYGEN SATURATION: 97 % | SYSTOLIC BLOOD PRESSURE: 122 MMHG | HEART RATE: 86 BPM | WEIGHT: 311 LBS | TEMPERATURE: 97.1 F | DIASTOLIC BLOOD PRESSURE: 84 MMHG | HEIGHT: 72 IN | BODY MASS INDEX: 42.12 KG/M2

## 2020-07-08 DIAGNOSIS — E11.9 TYPE 2 DIABETES MELLITUS WITHOUT COMPLICATION, WITHOUT LONG-TERM CURRENT USE OF INSULIN (H): Primary | Chronic | ICD-10-CM

## 2020-07-08 DIAGNOSIS — E66.813 CLASS 3 SEVERE OBESITY DUE TO EXCESS CALORIES WITH BODY MASS INDEX (BMI) OF 40.0 TO 44.9 IN ADULT, UNSPECIFIED WHETHER SERIOUS COMORBIDITY PRESENT (H): ICD-10-CM

## 2020-07-08 DIAGNOSIS — E66.01 CLASS 3 SEVERE OBESITY DUE TO EXCESS CALORIES WITH BODY MASS INDEX (BMI) OF 40.0 TO 44.9 IN ADULT, UNSPECIFIED WHETHER SERIOUS COMORBIDITY PRESENT (H): ICD-10-CM

## 2020-07-08 LAB
ALBUMIN SERPL-MCNC: 3.9 G/DL (ref 3.4–5)
ALP SERPL-CCNC: 116 U/L (ref 40–150)
ALT SERPL W P-5'-P-CCNC: 116 U/L (ref 0–70)
ANION GAP SERPL CALCULATED.3IONS-SCNC: 9 MMOL/L (ref 3–14)
AST SERPL W P-5'-P-CCNC: 45 U/L (ref 0–45)
BILIRUB SERPL-MCNC: 1 MG/DL (ref 0.2–1.3)
BUN SERPL-MCNC: 13 MG/DL (ref 7–30)
CALCIUM SERPL-MCNC: 9.2 MG/DL (ref 8.5–10.1)
CHLORIDE SERPL-SCNC: 100 MMOL/L (ref 94–109)
CHOLEST SERPL-MCNC: 130 MG/DL
CO2 SERPL-SCNC: 27 MMOL/L (ref 20–32)
CREAT SERPL-MCNC: 0.73 MG/DL (ref 0.66–1.25)
CREAT UR-MCNC: 210 MG/DL
GFR SERPL CREATININE-BSD FRML MDRD: >90 ML/MIN/{1.73_M2}
GLUCOSE SERPL-MCNC: 311 MG/DL (ref 70–99)
HBA1C MFR BLD: 11 % (ref 0–5.6)
HDLC SERPL-MCNC: 40 MG/DL
LDLC SERPL CALC-MCNC: 44 MG/DL
MICROALBUMIN UR-MCNC: 66 MG/L
MICROALBUMIN/CREAT UR: 31.48 MG/G CR (ref 0–17)
NONHDLC SERPL-MCNC: 90 MG/DL
POTASSIUM SERPL-SCNC: 4.1 MMOL/L (ref 3.4–5.3)
PROT SERPL-MCNC: 7.6 G/DL (ref 6.8–8.8)
SODIUM SERPL-SCNC: 136 MMOL/L (ref 133–144)
TRIGL SERPL-MCNC: 230 MG/DL

## 2020-07-08 PROCEDURE — 80053 COMPREHEN METABOLIC PANEL: CPT | Performed by: FAMILY MEDICINE

## 2020-07-08 PROCEDURE — 99214 OFFICE O/P EST MOD 30 MIN: CPT | Mod: 25 | Performed by: FAMILY MEDICINE

## 2020-07-08 PROCEDURE — 36415 COLL VENOUS BLD VENIPUNCTURE: CPT | Performed by: FAMILY MEDICINE

## 2020-07-08 PROCEDURE — 80061 LIPID PANEL: CPT | Performed by: FAMILY MEDICINE

## 2020-07-08 PROCEDURE — 83036 HEMOGLOBIN GLYCOSYLATED A1C: CPT | Performed by: FAMILY MEDICINE

## 2020-07-08 PROCEDURE — 82043 UR ALBUMIN QUANTITATIVE: CPT | Performed by: FAMILY MEDICINE

## 2020-07-08 RX ORDER — LISINOPRIL 2.5 MG/1
2.5 TABLET ORAL DAILY
Qty: 90 TABLET | Refills: 1 | Status: SHIPPED | OUTPATIENT
Start: 2020-07-08 | End: 2021-05-24

## 2020-07-08 RX ORDER — ATORVASTATIN CALCIUM 40 MG/1
40 TABLET, FILM COATED ORAL DAILY
Qty: 90 TABLET | Refills: 1 | Status: SHIPPED | OUTPATIENT
Start: 2020-07-08 | End: 2020-12-31

## 2020-07-08 RX ORDER — DULAGLUTIDE 1.5 MG/.5ML
1.5 INJECTION, SOLUTION SUBCUTANEOUS
Qty: 12 ML | Refills: 1 | Status: SHIPPED | OUTPATIENT
Start: 2020-07-08 | End: 2021-05-24

## 2020-07-08 RX ORDER — METFORMIN HCL 500 MG
1000 TABLET, EXTENDED RELEASE 24 HR ORAL 2 TIMES DAILY WITH MEALS
Qty: 360 TABLET | Refills: 1 | Status: SHIPPED | OUTPATIENT
Start: 2020-07-08 | End: 2020-09-03

## 2020-07-08 ASSESSMENT — MIFFLIN-ST. JEOR: SCORE: 2338.69

## 2020-07-08 NOTE — PROGRESS NOTES
SUBJECTIVE:  Norberto Doty presents today for follow up of DIABETES MELLITUS and obesity.       Patient concerns: blood sugars are going up and has not been taking metformin    Patient glucose self monitoring: one time daily, once daily.  Blood glucose averages: 300  Symptoms of low blood sugar (hypoglycemia)? n  Problems taking medications regularly? no   Side effects? diarrhea  What are you doing for exercise outside of work or your daily activities? Only work  Reviewed health maintenance  Patient Active Problem List   Diagnosis     Punctate keratitis, bilateral     Uncontrolled diabetes mellitus type 2 without complications     Morbid obesity due to excess calories (H)     Mixed hyperlipidemia     Essential hypertension     Type 2 diabetes mellitus (H)     DENISE (obstructive sleep apnea)       Smokes n  PHQ-2  Over the last two weeks- Have you been bothered by little interest or pleasure in doing things?  No  Over the last two weeks- Have you been been feeling down, depressed, or hopeless?  No    BP:   BP Readings from Last 3 Encounters:   07/08/20 122/84   01/08/20 129/78   12/13/19 (!) 141/85       Lab Results   Component Value Date    A1C 6.9 01/08/2020    A1C 6.2 07/02/2019    A1C 6.9 01/03/2019       Recent Labs   Lab Test 07/02/19  0812 07/24/18  0805   CHOL 98 140   HDL 49 51   LDL 31 62   TRIG 91 136       Wt Readings from Last 3 Encounters:   07/08/20 141.1 kg (311 lb)   01/08/20 149.4 kg (329 lb 6.4 oz)   12/13/19 149.2 kg (329 lb)     Asa is included in med list    Current Outpatient Medications   Medication Sig Dispense Refill     ACCU-CHEK GUIDE test strip USE  THREE TIMES DAILY OR  AS  DIRECTED 300 strip 1     aspirin  MG tablet Take 1 tablet (325 mg) by mouth daily 90 tablet 3     atorvastatin (LIPITOR) 40 MG tablet Take 1 tablet (40 mg) by mouth daily 90 tablet 1     blood glucose monitoring (ACCU-CHEK FASTCLIX) lancets Use to test blood sugar 3 times daily or as directed.  Ok to substitute  alternative if insurance prefers. 102 each 11     Carboxymeth-Glycerin-Polysorb (REFRESH OPTIVE ADVANCED) 0.5-1-0.5 % SOLN Apply 1 drop to eye 2 times daily       dulaglutide (TRULICITY) 1.5 MG/0.5ML pen Inject 1.5 mg Subcutaneous every 7 days 12 mL 1     ibuprofen (ADVIL/MOTRIN) 600 MG tablet TAKE 1 TABLET BY MOUTH THREE TIMES DAILY WITH MEALS FOR 10 DAYS . MAXIMUM OF 3200 MG IN 24 HOURS.  0     insulin pen needle (BD RAMILA U/F) 32G X 4 MM Use 1 daily as directed. 100 each 1     lisinopril (ZESTRIL) 2.5 MG tablet Take 1 tablet by mouth once daily 90 tablet 0     metFORMIN (GLUCOPHAGE-XR) 500 MG 24 hr tablet TAKE 2 TABLETS BY MOUTH TWICE DAILY WITH MEALS 360 tablet 0     sildenafil (REVATIO) 20 MG tablet May take 1-5 pills one hour before intercourse 50 tablet 3       Histories reviewed and updated in Epic.       EXAM:  Vitals: /84   Pulse 86   Temp 97.1  F (36.2  C) (Tympanic)   Resp 16   Ht 1.829 m (6')   Wt 141.1 kg (311 lb)   SpO2 97%   BMI 42.18 kg/m    BMIE= Body mass index is 42.18 kg/m .  GENERAL APPEARANCE: alert and no acute distress  PSYCH: mentation appears normal and affect normal/bright  RESP: lungs clear to auscultation - no rales, rhonchi or wheezes  CV: regular rate and rhythm, normal S1 S2, no S3 or S4 and no murmur, click or rub -  EXT: no cyanosis or edema in lower extremities  SKIN: no venous stasis changes    ICD-10-CM    1. Type 2 diabetes mellitus without complication, without long-term current use of insulin (H)  E11.9 ROUTINE FOOT CARE BY A PHYSICIAN OF A DIABETIC PATIENT WITH DIABETIC SENSORY     Hemoglobin A1c     Comprehensive metabolic panel (BMP + Alb, Alk Phos, ALT, AST, Total. Bili, TP)     Albumin Random Urine Quantitative with Creat Ratio     Lipid panel reflex to direct LDL Fasting   2. Class 3 severe obesity due to excess calories with body mass index (BMI) of 40.0 to 44.9 in adult, unspecified whether serious comorbidity present (H)  E66.01     Z68.41     PLAN:Follow  up in 6 months.  F/u with diabetic ed  Lose weight

## 2020-07-10 ENCOUNTER — NURSE TRIAGE (OUTPATIENT)
Dept: NURSING | Facility: CLINIC | Age: 45
End: 2020-07-10

## 2020-07-10 ENCOUNTER — DOCUMENTATION ONLY (OUTPATIENT)
Dept: FAMILY MEDICINE | Facility: CLINIC | Age: 45
End: 2020-07-10

## 2020-07-10 ENCOUNTER — PATIENT OUTREACH (OUTPATIENT)
Dept: EDUCATION SERVICES | Facility: CLINIC | Age: 45
End: 2020-07-10
Payer: COMMERCIAL

## 2020-07-10 ENCOUNTER — TELEPHONE (OUTPATIENT)
Dept: EDUCATION SERVICES | Facility: CLINIC | Age: 45
End: 2020-07-10

## 2020-07-10 DIAGNOSIS — E11.9 TYPE 2 DIABETES MELLITUS WITHOUT COMPLICATION, WITHOUT LONG-TERM CURRENT USE OF INSULIN (H): Primary | Chronic | ICD-10-CM

## 2020-07-10 PROCEDURE — 98968 PH1 ASSMT&MGMT NQHP 21-30: CPT

## 2020-07-10 RX ORDER — PEN NEEDLE, DIABETIC 32GX 5/32"
NEEDLE, DISPOSABLE MISCELLANEOUS
Qty: 100 EACH | Refills: 3 | Status: SHIPPED | OUTPATIENT
Start: 2020-07-10 | End: 2021-06-09

## 2020-07-10 NOTE — TELEPHONE ENCOUNTER
Placed the order for Tresiba for patient , to give 28 units calculated with 141 kg X 0.2 units = 28 units per IDC guidelines.  Verbal concent given by Dr. Neetu Suero RN/CDE  [place] [facility]

## 2020-07-10 NOTE — TELEPHONE ENCOUNTER
Wife calling ,she is not with pt  No consent to communicate on file  Pt BS tested last night @ 381, pt was not symptomatic,  Wife will have pt call to give further information  Wife is also  wondering what to do about getting an appt to see diabetes RN, nothing is available  for 2 weeks. A nurse from that clinic is trying to work them in.    Sindhu Lamar RN  [facility]        Additional Information    [1] Caller is not with the adult (patient) AND [2] probable NON-URGENT symptoms    Negative: Question about upcoming scheduled test, no triage required and triager able to answer question    Negative: General information question, no triage required and triager able to answer question    Negative: Health Information question, no triage required and triager able to answer question    Negative: Requesting regular office appointment    Negative: [1] Caller requesting NON-URGENT health information AND [2] PCP's office is the best resource    Negative: RN needs further essential information from caller in order to complete triage    Negative: [1] Caller is not with the adult (patient) AND [2] reporting urgent symptoms    Negative: Lab result questions    Negative: Medication questions    Negative: Caller can't be reached by phone    Negative: Caller has already spoken to PCP or another triager    Protocols used: INFORMATION ONLY CALL-A-

## 2020-07-10 NOTE — PROGRESS NOTES
Diabetes Self Management Training: Individual Review Visit TELEPHONE VISIT    Patient verbally consented to the telephone visit service today: yes    Norberto Doty presents today for initiation of insulin related to Type 2 diabetes.    He is accompanied by self    Patient's diabetes management related comments/concerns: my numbers are in the 300s all of the time, I don't know what changed but all of the sudden they are high, I was only taking 1 tablet of metformin in AM and afternoon but then increased to 2 and 2 but didn't really help anything. My wife is freaking out about high blood sugars.     Patient's emotional response to diabetes: expresses readiness to learn and concern for health and well-being    Patient would like this visit to be focused around the following diabetes-related behaviors and goals: Diabetes pathophysiology, Assistance with making lifestyle changes and Taking Medication    ASSESSMENT:  Patient Problem List and Family Medical History reviewed for relevant medical history, current medical status, and diabetes risk factors.    Patient's wife very concerned about high blood sugars, wondering if he should got ER. He feels he doesn't know what made things change - things had been going well, maybe stress? He was on insulin several years ago but had controlled things with metformin & Trulicity.     Current Diabetes Management per Patient:  Taking diabetes medications? yes:     Diabetes Medication(s)     Biguanides       metFORMIN (GLUCOPHAGE-XR) 500 MG 24 hr tablet    Take 2 tablets (1,000 mg) by mouth 2 times daily (with meals)    Incretin Mimetic Agents (GLP-1 Receptor Agonists)       dulaglutide (TRULICITY) 1.5 MG/0.5ML pen    Inject 1.5 mg Subcutaneous every 7 days          Do you have any difficulty affording your medications or glucose monitoring supplies?     No       Patient glucose self monitoring as follows: four times daily.   BG meter: unknown meter  BG results:    7/8   Fasting -  326,   before lunch 322,   After lunch 371,   After dinner (within 15 minutes) 381     Prior to this was only checking fasting numbers and they were all in the 300s recently    BG values are: Not in goal  Patient's most recent   Lab Results   Component Value Date    A1C 11.0 07/08/2020    is not meeting goal of <7.0    Nutrition:  Patient eats 3 meals per day, eats out more than once a week and sometimes struggles due to ongoing GI issues     B - 2 burritos, vanilla iced coffee   AM snk - Ritz crackers   L - [employer] chicken wrap OR hoagies OR chicken brandin  D - meat/chicken, veggies, potatoes     1-2 sodas per day max, usually less OR sparkling ICE drinks  Will drink regular soda if having cocktails at night     Cultural/Samaritan diet restrictions: No     Biggest Challenge to Healthy Eating: portion control and knowing what to eat    Physical Activity:    Mostly just active at work, has new puppy so is taking dog out more     Diabetes Risk Factors:  family history, age over 45 years, ethnicity, hypertension, hyperlipidemia, hypertriglyceridemia, overweight/obesity, inactivity, history of gestational diabetes     Relevant co-morbidities and related health problems:  Significant for:  Not assessed     Diabetes Complications:  Not discussed today.    Recent health service and resource utilization related to diabetes (hyperglycemia, hypoglycemia, etc):   Not Assessed    Vitals:  There were no vitals taken for this visit.  Estimated body mass index is 42.18 kg/m  as calculated from the following:    Height as of 7/8/20: 1.829 m (6').    Weight as of 7/8/20: 141.1 kg (311 lb).   Last 3 BP:   BP Readings from Last 3 Encounters:   07/08/20 122/84   01/08/20 129/78   12/13/19 (!) 141/85       History   Smoking Status     Never Smoker   Smokeless Tobacco     Never Used       Labs:  Lab Results   Component Value Date    A1C 11.0 07/08/2020     Lab Results   Component Value Date     07/08/2020     Lab Results  "  Component Value Date    LDL 44 07/08/2020     HDL Cholesterol   Date Value Ref Range Status   07/08/2020 40 >39 mg/dL Final   ]  GFR Estimate   Date Value Ref Range Status   07/08/2020 >90 >60 mL/min/[1.73_m2] Final     Comment:     Non  GFR Calc  Starting 12/18/2018, serum creatinine based estimated GFR (eGFR) will be   calculated using the Chronic Kidney Disease Epidemiology Collaboration   (CKD-EPI) equation.       GFR Estimate If Black   Date Value Ref Range Status   07/08/2020 >90 >60 mL/min/[1.73_m2] Final     Comment:      GFR Calc  Starting 12/18/2018, serum creatinine based estimated GFR (eGFR) will be   calculated using the Chronic Kidney Disease Epidemiology Collaboration   (CKD-EPI) equation.       Lab Results   Component Value Date    CR 0.73 07/08/2020     No results found for: MICROALBUMIN    Socio/Economic/Cultural Considerations:    Support system: spouse/significant other    Cultural Influences/Ethnic Background:  American    Health Literacy/Numeracy:  \"With diabetes, it's helpful to use forms and log books to write down blood sugars and what you're eating at times to help understand how foods affect your blood sugars. With this in mind how confident are you at filling out medical forms, such as these, by yourself?  Not Assessed    Health Beliefs and Attitudes:   Patient Activation Measure Survey Score:  MELANI Score (Last Two) 4/21/2016 7/8/2020   MELANI Raw Score 52 40   Activation Score 100 100   MELANI Level 4 4       Stage of Change: PREPARATION (Decided to change - considering how)      Diabetes knowledge and skills assessment:     Patient is knowledgeable in diabetes management concepts related to: Healthy Eating, Taking Medication and Problem Solving    Patient needs further education on the following diabetes management concepts: Healthy Eating, Being Active, Monitoring, Taking Medication, Problem Solving, Reducing Risks and Healthy Coping    Barriers to Learning " Assessment: No Barriers identified    Based on learning assessment above, most appropriate setting for further diabetes education would be: Group class or Individual setting.    INTERVENTION:   Education provided today on:  AADE Self-Care Behaviors:  Diabetes Pathophysiology  Healthy Eating: carbohydrate counting, weight reduction, heart healthy diet, eating out and portion control  Being Active: relationship to blood glucose, describe appropriate activity program and precautions to take  Monitoring: individual blood glucose targets and frequency of monitoring  Taking Medication: action of prescribed medication, drawing up, administering and storing injectable diabetes medications, side effects of prescribed medications, when to take medications  Problem Solving: high blood glucose - causes, signs/symptoms, treatment and prevention, low blood glucose - causes, signs/symptoms, treatment and prevention, carrying a carbohydrate source at all times, safe travel and when to call health care provider  Reducing Risks: major complications of diabetes, prevention, early diagnostic measures and treatment of complications and A1C - goals, relating to blood glucose levels, how often to check    Opportunities for ongoing education and support in diabetes-self management were discussed.    Pt verbalized understanding of concepts discussed and recommendations provided today.       Education Materials Provided:  No new materials provided today    PLAN:  Check blood sugars fasting, before meals, 2 hours after the start of meals - continue with 4 times daily, per PCP  Recommend that patient begin Tresiba U100 insulin - 28 units at bedtime (0.2 units/kg based on weigh tof 141 kg from 7/8)  RN/CDE had spoken with seble grewal and received sign off for above recommendation & orders placed.     FOLLOW-UP:  Follow-up appointment scheduled on 8/12.  Follow-up with endocrinology next week.  Chart routed to referring provider.    Ongoing  plan for education and support: Follow-up visit with diabetes educator in 1 month    Katie Dacosta RD, LD, CDE   Time Spent: 51 minutes  Encounter Type: Individual, telephone     Any diabetes medication dose changes were made via the CDE Protocol and Collaborative Practice Agreement with the patient's referring provider. A copy of this encounter was shared with the provider.

## 2020-07-15 NOTE — PROGRESS NOTES
"Norberto Doty is a 44 year old male who is being evaluated via a billable video visit.      The patient has been notified of following:     \"This video visit will be conducted via a call between you and your physician/provider. We have found that certain health care needs can be provided without the need for an in-person physical exam.  This service lets us provide the care you need with a video conversation.  If a prescription is necessary we can send it directly to your pharmacy.  If lab work is needed we can place an order for that and you can then stop by our lab to have the test done at a later time.    Video visits are billed at different rates depending on your insurance coverage.  Please reach out to your insurance provider with any questions.    If during the course of the call the physician/provider feels a video visit is not appropriate, you will not be charged for this service.\"    Patient has given verbal consent for Video visit? Yes  How would you like to obtain your AVS? MyChart  If you are dropped from the video visit, the video invite should be resent to: MyChart  Will anyone else be joining your video visit? No        Video-Visit Details    Type of service:  Video Visit    Video Start Time: 1:00 PM  Video End Time: 1330    Originating Location (pt. Location): Home    Distant Location (provider location):  [facility]     Platform used for Video Visit: Beata      CC: DM.     HPI: Patient here for management of DM.   He was diagnosed in 2016.   Presented with polyuria.     He has been taking metformin 1000 mg BID and trulicity 1.5 mg once a week.   Noticed glucoses began to climb in 1/2020.     Recently started on tresiba 28 U every day.     He has recently begun to check glucose 4/day.   No particular diet for his DM.     Logs since starting insulin:  292 287 365 292  285 375 286  277 312 405 291   305 364 334 360  320 307 381 382  325 329     ROS: 10 point ROS neg other than the symptoms " noted above in the HPI.    PMH:   Patient Active Problem List   Diagnosis     Punctate keratitis, bilateral     Uncontrolled diabetes mellitus type 2 without complications     Morbid obesity due to excess calories (H)     Mixed hyperlipidemia     Essential hypertension     Type 2 diabetes mellitus (H)     DENISE (obstructive sleep apnea)     Meds:  Current Outpatient Medications   Medication     ACCU-CHEK GUIDE test strip     aspirin  MG tablet     atorvastatin (LIPITOR) 40 MG tablet     blood glucose monitoring (ACCU-CHEK FASTCLIX) lancets     Carboxymeth-Glycerin-Polysorb (REFRESH OPTIVE ADVANCED) 0.5-1-0.5 % SOLN     dulaglutide (TRULICITY) 1.5 MG/0.5ML pen     ibuprofen (ADVIL/MOTRIN) 600 MG tablet     insulin degludec (TRESIBA) 100 UNIT/ML pen     insulin pen needle (BD RAMILA U/F) 32G X 4 MM miscellaneous     insulin pen needle (BD RAMILA U/F) 32G X 4 MM     lisinopril (ZESTRIL) 2.5 MG tablet     metFORMIN (GLUCOPHAGE-XR) 500 MG 24 hr tablet     sildenafil (REVATIO) 20 MG tablet     No current facility-administered medications for this visit.      FHX:   Aunt with DM  Uncle with DM.     SHX:  Works in [employer].   Non-smoker.     Exam:   GENERAL: Healthy, alert and no distress  EYES: Eyes grossly normal to inspection.  No discharge or erythema, or obvious scleral/conjunctival abnormalities.  HENT: Normal cephalic/atraumatic.  External ears, nose and mouth without ulcers or lesions.  No nasal drainage visible.  NECK: No asymmetry, visible masses or scars  RESP: No audible wheeze, cough, or visible cyanosis.  No visible retractions or increased work of breathing.    MS: No gross musculoskeletal defects noted.  Normal range of motion.  No visible edema.  SKIN: Visible skin clear. No significant rash, abnormal pigmentation or lesions.  NEURO: Cranial nerves grossly intact.  Mentation and speech appropriate for age.  PSYCH: Mentation appears normal, affect normal/bright, judgement and insight intact, normal  speech and appearance well-groomed.    A/P:   Type 2 DM - Uncontrolled. Discussed SGLT-2 inhibitors. Given recent worsening in HbA1C, we should rule out ADDIS first.   -Schedule labs. If consistent with type 2 DM, will add Jardiance.   -Increase tresiba to 36 units daily.   -ASA taking.  -BP: normal on last check.   -NAFL/DEMPSEY: Elevated ALT in 7/2020. US from 2019 shows fatty liver. Discussed significance with patient.   -Lipids: Trg 230, HDL 40, LDL 44. On atorvastatin.    Will add vascepa if Trg do not improve with better DM control.   -Microalbumin 31.48 in 7/2020. On lisinopril.   -Eyes: no DR in 2018. Due for exam.   -Smoking: none.       Jose De Jesus Mars MD on 7/16/2020 at 1:50 PM

## 2020-07-16 ENCOUNTER — VIRTUAL VISIT (OUTPATIENT)
Dept: ENDOCRINOLOGY | Facility: CLINIC | Age: 45
End: 2020-07-16
Payer: COMMERCIAL

## 2020-07-16 DIAGNOSIS — E78.2 MIXED HYPERLIPIDEMIA: Chronic | ICD-10-CM

## 2020-07-16 DIAGNOSIS — Z79.4 TYPE 2 DIABETES MELLITUS WITH HYPERGLYCEMIA, WITH LONG-TERM CURRENT USE OF INSULIN (H): Primary | Chronic | ICD-10-CM

## 2020-07-16 DIAGNOSIS — I10 ESSENTIAL HYPERTENSION: Chronic | ICD-10-CM

## 2020-07-16 DIAGNOSIS — E11.65 TYPE 2 DIABETES MELLITUS WITH HYPERGLYCEMIA, WITH LONG-TERM CURRENT USE OF INSULIN (H): Primary | Chronic | ICD-10-CM

## 2020-07-16 PROCEDURE — 99203 OFFICE O/P NEW LOW 30 MIN: CPT | Mod: 95 | Performed by: INTERNAL MEDICINE

## 2020-07-16 NOTE — LETTER
"    7/16/2020         RE: Norberto Doty  [address] [facility] [address]        Dear Colleague,    Thank you for referring your patient, Norberto Doty, to the [facility]. Please see a copy of my visit note below.    Norberto Doty is a 44 year old male who is being evaluated via a billable video visit.      The patient has been notified of following:     \"This video visit will be conducted via a call between you and your physician/provider. We have found that certain health care needs can be provided without the need for an in-person physical exam.  This service lets us provide the care you need with a video conversation.  If a prescription is necessary we can send it directly to your pharmacy.  If lab work is needed we can place an order for that and you can then stop by our lab to have the test done at a later time.    Video visits are billed at different rates depending on your insurance coverage.  Please reach out to your insurance provider with any questions.    If during the course of the call the physician/provider feels a video visit is not appropriate, you will not be charged for this service.\"    Patient has given verbal consent for Video visit? Yes  How would you like to obtain your AVS? MyChart  If you are dropped from the video visit, the video invite should be resent to: MyChart  Will anyone else be joining your video visit? No        Video-Visit Details    Type of service:  Video Visit    Video Start Time: 1:00 PM  Video End Time: 1330    Originating Location (pt. Location): Home    Distant Location (provider location):  [facility]     Platform used for Video Visit: Beata      CC: DM.     HPI: Patient here for management of DM.   He was diagnosed in 2016.   Presented with polyuria.     He has been taking metformin 1000 mg BID and trulicity 1.5 mg once a week.   Noticed glucoses began to climb in 1/2020.     Recently started on tresiba 28 U every day.     He has recently " begun to check glucose 4/day.   No particular diet for his DM.     Logs since starting insulin:  [phone] 292  285 375 286  277 312 405 291   305 364 334 360  320 307 381 382  325 329     ROS: 10 point ROS neg other than the symptoms noted above in the HPI.    PMH:   Patient Active Problem List   Diagnosis     Punctate keratitis, bilateral     Uncontrolled diabetes mellitus type 2 without complications     Morbid obesity due to excess calories (H)     Mixed hyperlipidemia     Essential hypertension     Type 2 diabetes mellitus (H)     DENISE (obstructive sleep apnea)     Meds:  Current Outpatient Medications   Medication     ACCU-CHEK GUIDE test strip     aspirin  MG tablet     atorvastatin (LIPITOR) 40 MG tablet     blood glucose monitoring (ACCU-CHEK FASTCLIX) lancets     Carboxymeth-Glycerin-Polysorb (REFRESH OPTIVE ADVANCED) 0.5-1-0.5 % SOLN     dulaglutide (TRULICITY) 1.5 MG/0.5ML pen     ibuprofen (ADVIL/MOTRIN) 600 MG tablet     insulin degludec (TRESIBA) 100 UNIT/ML pen     insulin pen needle (BD RAMILA U/F) 32G X 4 MM miscellaneous     insulin pen needle (BD RAMILA U/F) 32G X 4 MM     lisinopril (ZESTRIL) 2.5 MG tablet     metFORMIN (GLUCOPHAGE-XR) 500 MG 24 hr tablet     sildenafil (REVATIO) 20 MG tablet     No current facility-administered medications for this visit.      FHX:   Aunt with DM  Uncle with DM.     SHX:  Works in [employer].   Non-smoker.     Exam:   GENERAL: Healthy, alert and no distress  EYES: Eyes grossly normal to inspection.  No discharge or erythema, or obvious scleral/conjunctival abnormalities.  HENT: Normal cephalic/atraumatic.  External ears, nose and mouth without ulcers or lesions.  No nasal drainage visible.  NECK: No asymmetry, visible masses or scars  RESP: No audible wheeze, cough, or visible cyanosis.  No visible retractions or increased work of breathing.    MS: No gross musculoskeletal defects noted.  Normal range of motion.  No visible edema.  SKIN: Visible skin  clear. No significant rash, abnormal pigmentation or lesions.  NEURO: Cranial nerves grossly intact.  Mentation and speech appropriate for age.  PSYCH: Mentation appears normal, affect normal/bright, judgement and insight intact, normal speech and appearance well-groomed.    A/P:   Type 2 DM - Uncontrolled. Discussed SGLT-2 inhibitors. Given recent worsening in HbA1C, we should rule out ADDIS first.   -Schedule labs. If consistent with type 2 DM, will add Jardiance.   -Increase tresiba to 36 units daily.   -ASA taking.  -BP: normal on last check.   -NAFL/DEMPSEY: Elevated ALT in 7/2020. US from 2019 shows fatty liver. Discussed significance with patient.   -Lipids: Trg 230, HDL 40, LDL 44. On atorvastatin.    Will add vascepa if Trg do not improve with better DM control.   -Microalbumin 31.48 in 7/2020. On lisinopril.   -Eyes: no DR in 2018. Due for exam.   -Smoking: none.       Jose De Jesus Mars MD on 7/16/2020 at 1:50 PM              Again, thank you for allowing me to participate in the care of your patient.        Sincerely,        Jose De Jesus Mars MD

## 2020-07-17 DIAGNOSIS — Z79.4 TYPE 2 DIABETES MELLITUS WITH HYPERGLYCEMIA, WITH LONG-TERM CURRENT USE OF INSULIN (H): Chronic | ICD-10-CM

## 2020-07-17 DIAGNOSIS — E11.65 TYPE 2 DIABETES MELLITUS WITH HYPERGLYCEMIA, WITH LONG-TERM CURRENT USE OF INSULIN (H): Chronic | ICD-10-CM

## 2020-07-17 LAB
GLUCOSE SERPL-MCNC: 320 MG/DL (ref 70–99)
TSH SERPL DL<=0.005 MIU/L-ACNC: 2.29 MU/L (ref 0.4–4)

## 2020-07-17 PROCEDURE — 84443 ASSAY THYROID STIM HORMONE: CPT | Performed by: INTERNAL MEDICINE

## 2020-07-17 PROCEDURE — 36415 COLL VENOUS BLD VENIPUNCTURE: CPT | Performed by: INTERNAL MEDICINE

## 2020-07-17 PROCEDURE — 84681 ASSAY OF C-PEPTIDE: CPT | Performed by: INTERNAL MEDICINE

## 2020-07-17 PROCEDURE — 86341 ISLET CELL ANTIBODY: CPT | Mod: 90 | Performed by: INTERNAL MEDICINE

## 2020-07-17 PROCEDURE — 99000 SPECIMEN HANDLING OFFICE-LAB: CPT | Performed by: INTERNAL MEDICINE

## 2020-07-17 PROCEDURE — 82947 ASSAY GLUCOSE BLOOD QUANT: CPT | Performed by: INTERNAL MEDICINE

## 2020-07-19 LAB — GAD65 AB SER IA-ACNC: <5 IU/ML (ref 0–5)

## 2020-07-20 DIAGNOSIS — E11.65 TYPE 2 DIABETES MELLITUS WITH HYPERGLYCEMIA, WITH LONG-TERM CURRENT USE OF INSULIN (H): Primary | ICD-10-CM

## 2020-07-20 DIAGNOSIS — Z79.4 TYPE 2 DIABETES MELLITUS WITH HYPERGLYCEMIA, WITH LONG-TERM CURRENT USE OF INSULIN (H): Primary | ICD-10-CM

## 2020-07-20 LAB — C PEPTIDE SERPL-MCNC: 6 NG/ML (ref 0.9–6.9)

## 2020-07-20 RX ORDER — DAPAGLIFLOZIN 5 MG/1
5 TABLET, FILM COATED ORAL DAILY
Qty: 30 TABLET | Refills: 11 | Status: SHIPPED | OUTPATIENT
Start: 2020-07-20 | End: 2021-06-10

## 2020-07-24 ENCOUNTER — TELEPHONE (OUTPATIENT)
Dept: FAMILY MEDICINE | Facility: CLINIC | Age: 45
End: 2020-07-24

## 2020-07-24 ENCOUNTER — OFFICE VISIT (OUTPATIENT)
Dept: OPTOMETRY | Facility: CLINIC | Age: 45
End: 2020-07-24
Payer: COMMERCIAL

## 2020-07-24 ENCOUNTER — TELEPHONE (OUTPATIENT)
Dept: OPTOMETRY | Facility: CLINIC | Age: 45
End: 2020-07-24

## 2020-07-24 DIAGNOSIS — E11.3293 TYPE 2 DIABETES MELLITUS WITH MILD NONPROLIFERATIVE RETINOPATHY OF BOTH EYES WITHOUT MACULAR EDEMA, UNSPECIFIED WHETHER LONG TERM INSULIN USE (H): Primary | ICD-10-CM

## 2020-07-24 DIAGNOSIS — H52.13 MYOPIA OF BOTH EYES: ICD-10-CM

## 2020-07-24 DIAGNOSIS — H52.223 REGULAR ASTIGMATISM OF BOTH EYES: ICD-10-CM

## 2020-07-24 DIAGNOSIS — H52.4 PRESBYOPIA: ICD-10-CM

## 2020-07-24 DIAGNOSIS — E11.9 TYPE 2 DIABETES MELLITUS WITHOUT COMPLICATION, WITHOUT LONG-TERM CURRENT USE OF INSULIN (H): Chronic | ICD-10-CM

## 2020-07-24 PROCEDURE — 92014 COMPRE OPH EXAM EST PT 1/>: CPT | Performed by: OPTOMETRIST

## 2020-07-24 PROCEDURE — 92015 DETERMINE REFRACTIVE STATE: CPT | Performed by: OPTOMETRIST

## 2020-07-24 PROCEDURE — 92310 CONTACT LENS FITTING OU: CPT | Performed by: OPTOMETRIST

## 2020-07-24 ASSESSMENT — REFRACTION_WEARINGRX
OS_ADD: +1.50
OD_CYLINDER: SPHERE
OS_CYLINDER: +1.75
OS_AXIS: 160
OD_SPHERE: -9.25
OD_ADD: +1.50
OS_SPHERE: -11.00

## 2020-07-24 ASSESSMENT — REFRACTION_MANIFEST
OD_AXIS: 017
METHOD_AUTOREFRACTION: 1
OS_SPHERE: -11.75
OS_AXIS: 134
OS_CYLINDER: +2.00
OD_CYLINDER: +1.25
OD_ADD: +2.00
OD_AXIS: 010
OS_CYLINDER: +0.75
OD_SPHERE: -10.25
OS_ADD: +2.00
OS_SPHERE: -12.75
OS_AXIS: 165
OD_SPHERE: -10.75
OD_CYLINDER: +1.75

## 2020-07-24 ASSESSMENT — VISUAL ACUITY
METHOD: SNELLEN - LINEAR
OS_CC: 20/30
OS_CC: 20/40
CORRECTION_TYPE: CONTACTS
OD_CC: 20/60
OS_CC+: -1
OD_CC: 20/200

## 2020-07-24 ASSESSMENT — REFRACTION_CURRENTRX
OD_DIAMETER: 9.5
OD_BASECURVE: 7.85
OD_SPHERE: -10.00
OS_AXIS: 065
OS_DIAMETER: 9.5
OD_CYLINDER: -1.25
OS_SPHERE: -8.87
OS_SPHERE: -10.12
OS_AXIS: 080
OD_AXIS: 085
OD_BASECURVE: 7.85
OS_BASECURVE: 7.74
OS_DIAMETER: 9.5
OD_AXIS: 100
OD_DIAMETER: 9.5
OS_BASECURVE: 7.74
OS_CYLINDER: -1.25
OD_CYLINDER: -0.75
OS_CYLINDER: -1.00
OD_SPHERE: -8.37

## 2020-07-24 ASSESSMENT — TONOMETRY
IOP_METHOD: APPLANATION
OS_IOP_MMHG: 8
OD_IOP_MMHG: 8

## 2020-07-24 ASSESSMENT — KERATOMETRY
OS_K1POWER_DIOPTERS: 43.25
OS_AXISANGLE2_DEGREES: 22
OD_K2POWER_DIOPTERS: 43.5
OD_AXISANGLE2_DEGREES: 145
OS_K2POWER_DIOPTERS: 44.25
OD_K1POWER_DIOPTERS: 42.75

## 2020-07-24 ASSESSMENT — CONF VISUAL FIELD
OS_NORMAL: 1
OD_NORMAL: 1

## 2020-07-24 ASSESSMENT — CUP TO DISC RATIO
OD_RATIO: 0.2
OS_RATIO: 0.2

## 2020-07-24 ASSESSMENT — SLIT LAMP EXAM - LIDS
COMMENTS: 1+ PTOSIS
COMMENTS: 2+ PTOSIS

## 2020-07-24 ASSESSMENT — EXTERNAL EXAM - LEFT EYE: OS_EXAM: NORMAL

## 2020-07-24 ASSESSMENT — EXTERNAL EXAM - RIGHT EYE: OD_EXAM: NORMAL

## 2020-07-24 NOTE — PROGRESS NOTES
Chief Complaint   Patient presents with     Diabetic Eye Exam      diabetes 2 temporary use of insulin    10 % of time sleeps in contact lenses on weekend          Hemoglobin A1C   Date Value Ref Range Status   07/08/2020 11.0 (H) 0 - 5.6 % Final     Comment:     Normal <5.7% Prediabetes 5.7-6.4%  Diabetes 6.5% or higher - adopted from ADA   consensus guidelines.  Results confirmed by repeat test     01/08/2020 6.9 (H) 0 - 5.6 % Final     Comment:     Normal <5.7% Prediabetes 5.7-6.4%  Diabetes 6.5% or higher - adopted from ADA   consensus guidelines.     07/02/2019 6.2 (H) 0 - 5.6 % Final     Comment:     Normal <5.7% Prediabetes 5.7-6.4%  Diabetes 6.5% or higher - adopted from ADA   consensus guidelines.           Last Eye Exam: 2 years ago  Dilated Previously: Yes    What are you currently using to see?  Contacts, patient doesn't have eye glasses    Distance Vision Acuity: Noticed gradual change in both eyes    Near Vision Acuity: Satisfied with vision while reading  unaided    Eye Comfort: good  Do you use eye drops? : No  Occupation or Hobbies: manager    Charlotte Rivas, Optometry Tech     Medical, surgical and family histories reviewed and updated 7/24/2020.       OBJECTIVE: See Ophthalmology exam    ASSESSMENT:    ICD-10-CM    1. Type 2 diabetes mellitus with mild nonproliferative retinopathy of both eyes without macular edema, unspecified whether long term insulin use (H)  E11.3293 EYE EXAM (SIMPLE-NONBILLABLE)     REFRACTION     CONTACT LENS FITTING,BILAT   2. Myopia of both eyes  H52.13 EYE EXAM (SIMPLE-NONBILLABLE)     REFRACTION     CONTACT LENS FITTING,BILAT   3. Regular astigmatism of both eyes  H52.223 EYE EXAM (SIMPLE-NONBILLABLE)     REFRACTION     CONTACT LENS FITTING,BILAT   4. Presbyopia  H52.4 EYE EXAM (SIMPLE-NONBILLABLE)     REFRACTION     CONTACT LENS FITTING,BILAT      PLAN:    Norberto Doty aware  eye exam results will be sent to [facility] [place].  Patient Instructions   Patient was  advised of today's exam findings.  Fill glasses prescription  Allow 2 weeks to adapt to change in glasses  RTC 1 week after picking up RGP's to check vision and comfort   Work on improving blood sugar control  Return in 1 year for diabetic eye exam      Diabetes weakens the blood vessels all over the body, including the eyes. Damage to the blood vessels in the eyes can cause swelling or bleeding into part of the eye (called the retina). This is called diabetic retinopathy (CRISTINO-tin--pu-thee). If not treated, this disease can cause vision loss or blindness.   Symptoms may include blurred or distorted vision, but many people have no symptoms. It's important to see your eye doctor regularly to check for problems.   Early treatment and good control can help protect your vision. Here are the things you can do to help prevent vision loss:      1. Keep your blood sugar levels under tight control.      2. Bring high blood pressure under control.      3. No smoking.      4. Have yearly dilated eye exams.      Khadijah Rose O.D.  [facility]   [address] [place] [address] [phone]

## 2020-07-24 NOTE — TELEPHONE ENCOUNTER
7/24/2020    Ordered trials. Call patient for a dispense appointment when they arrive.      Right  Menicon Z Front toric  7.85  9.5  -8.37  -0.75  100    [id number] KGM ordered thru [facility]  at [facility]  lenticular, dot right , blue, warrenty    Left  Menicon Z Front toric  7.74  9.5  -8.87  -1.00  080    dario gómez warrenty Christine Kosak    Optometry Tech

## 2020-07-24 NOTE — LETTER
7/24/2020         RE: Norberto Doty  [address] [facility] [address]        Dear Colleague,    Thank you for referring your patient, Norberto Doty, to the [facility]. Mild non proliferative diabetic retinopathy.  Ipportance of blood sugar control discussed with patient .  See attached chart note for more details.       Again, thank you for allowing me to participate in the care of your patient.        Sincerely,        Khadijah Rose, OD

## 2020-07-24 NOTE — PATIENT INSTRUCTIONS
Patient was advised of today's exam findings.  Fill glasses prescription  Allow 2 weeks to adapt to change in glasses  RTC 1 week after picking up RGP's to check vision and comfort   Work on improving blood sugar control  Return in 1 year for diabetic eye exam      Diabetes weakens the blood vessels all over the body, including the eyes. Damage to the blood vessels in the eyes can cause swelling or bleeding into part of the eye (called the retina). This is called diabetic retinopathy (CRISTINO-tin--pu-thee). If not treated, this disease can cause vision loss or blindness.   Symptoms may include blurred or distorted vision, but many people have no symptoms. It's important to see your eye doctor regularly to check for problems.   Early treatment and good control can help protect your vision. Here are the things you can do to help prevent vision loss:      1. Keep your blood sugar levels under tight control.      2. Bring high blood pressure under control.      3. No smoking.      4. Have yearly dilated eye exams.      Khadijah Rose O.D.  [facility]   [address] [place] [address] [phone]

## 2020-07-29 NOTE — TELEPHONE ENCOUNTER
7/29/2020  I called patient and let him know that his RGP lenses are in and ready for .   I also reminded him that KGM wrote for him to wear them for a week and then RTC for a CL check.  He wasn't thrilled, and said that he would wear them and let her know if they didn't work and then come back.     Haley Miller Optometric Assistant

## 2020-08-12 ENCOUNTER — VIRTUAL VISIT (OUTPATIENT)
Dept: EDUCATION SERVICES | Facility: CLINIC | Age: 45
End: 2020-08-12
Payer: COMMERCIAL

## 2020-08-12 DIAGNOSIS — E11.9 TYPE 2 DIABETES MELLITUS WITHOUT COMPLICATION, WITHOUT LONG-TERM CURRENT USE OF INSULIN (H): Primary | ICD-10-CM

## 2020-08-12 NOTE — PROGRESS NOTES
Called patient today as he did not sign on for video visit. He is driving at time of call, states he forgot about today's visit. Requests a reschedule of the appointment and was able to reschedule in a few weeks. States overall his blood sugars are better than they were. Will discuss in more detail in 3 weeks.     Katie Dacosta, NICO, LD, CDE

## 2020-09-01 ENCOUNTER — OFFICE VISIT (OUTPATIENT)
Dept: OPTOMETRY | Facility: CLINIC | Age: 45
End: 2020-09-01
Payer: COMMERCIAL

## 2020-09-01 DIAGNOSIS — H52.223 REGULAR ASTIGMATISM OF BOTH EYES: ICD-10-CM

## 2020-09-01 DIAGNOSIS — E11.3293 TYPE 2 DIABETES MELLITUS WITH MILD NONPROLIFERATIVE RETINOPATHY OF BOTH EYES WITHOUT MACULAR EDEMA, UNSPECIFIED WHETHER LONG TERM INSULIN USE (H): ICD-10-CM

## 2020-09-01 DIAGNOSIS — H52.13 MYOPIA OF BOTH EYES: Primary | ICD-10-CM

## 2020-09-01 DIAGNOSIS — H52.4 PRESBYOPIA: ICD-10-CM

## 2020-09-01 PROCEDURE — 99207 ZZC NO CHARGE LOS: CPT | Performed by: OPTOMETRIST

## 2020-09-01 ASSESSMENT — REFRACTION_CURRENTRX
OS_CYLINDER: -2.00
OS_DIAMETER: 9.5
OD_SPHERE: -8.37
OD_DIAMETER: 9.5
OD_AXIS: 099
OS_CYLINDER: -1.00
OS_AXIS: 080
OS_BASECURVE: 7.74
OD_DIAMETER: 9.5
OD_AXIS: 100
OS_SPHERE: -8.87
OD_BASECURVE: 7.85
OS_SPHERE: -9.75
OD_CYLINDER: -1.25
OD_CYLINDER: -0.75
OS_DIAMETER: 9.5
OS_BASECURVE: 7.74
OD_SPHERE: -9.75
OS_AXIS: 074
OD_BASECURVE: 7.85

## 2020-09-01 ASSESSMENT — VISUAL ACUITY
METHOD: SNELLEN - LINEAR
OD_CC: 20/30
OD_CC: 20/100
VA_OR_OD_CURRENT_RX: 20/50-2
OS_CC: 20/30
CORRECTION_TYPE: CONTACTS
OD_CC+: -2
VA_OR_OS_CURRENT_RX: 40+3
OS_CC: 20/25
OS_CC+: -2

## 2020-09-01 ASSESSMENT — REFRACTION_WEARINGRX
OD_ADD: +1.50
OD_ADD: +2.00
OS_CYLINDER: +1.75
SPECS_TYPE: PAL
OD_SPHERE: -9.25
OD_SPHERE: -10.25
OS_SPHERE: -11.75
OD_CYLINDER: +1.25
OS_CYLINDER: +0.75
OS_ADD: +2.00
OD_CYLINDER: SPHERE
OS_SPHERE: -11.00
OS_ADD: +1.50
OS_AXIS: 165
OD_AXIS: 010
OS_AXIS: 160

## 2020-09-01 ASSESSMENT — REFRACTION_MANIFEST
OS_AXIS: 158
OS_AXIS: 170
OD_SPHERE: -1.00
OS_SPHERE: -2.00
OD_CYLINDER: +1.75
OD_AXIS: 005
OS_CYLINDER: +1.50
OS_CYLINDER: +1.00
OS_SPHERE: -12.25
METHOD_AUTOREFRACTION: 1
OD_SPHERE: -11.25
OD_CYLINDER: +0.50
OS_ADD: +2.00
OD_ADD: +2.00
OD_AXIS: 007
METHOD_AUTOREFRACTION: 1

## 2020-09-01 NOTE — PATIENT INSTRUCTIONS
RGP return to clinic for contact lenses check appointment 1-2 weeks later if any concerns.  Will put contact lenses prescription with new lenses for .    Khadijah Rose O.D.  [facility]   [address] [place], [place] [id number] [phone]

## 2020-09-01 NOTE — LETTER
9/1/2020         RE: Norberto Doty  [address]  [facility] [address]        Dear Colleague,    Thank you for referring your patient, Norberto Doty, to the [facility]. Please see a copy of my visit note below.    Chief Complaint   Patient presents with     Contact Lens Check     Came in wearing old lenses      Lab Results   Component Value Date    A1C 11.0 07/08/2020    A1C 6.9 01/08/2020    A1C 6.2 07/02/2019    A1C 6.9 01/03/2019    A1C 8.0 10/16/2018          Satisfied with contacts:  No He came in wearing the old lenses. New ones don't work for him.    Good comfort:  They seemed fine, he only wore them for 1 day.   Clear vision:     No Couldn't wear them. Driving was difficult. Left one was the worst, but neither seemed great     Haley Miller Optometric Assistant           Medical, surgical and family histories reviewed and updated 9/1/2020.       OBJECTIVE: See Ophthalmology exam    ASSESSMENT:  No diagnosis found.   PLAN:    There are no Patient Instructions on file for this visit.                   Again, thank you for allowing me to participate in the care of your patient.        Sincerely,        Khadijah Rose, OD

## 2020-09-03 ENCOUNTER — TELEPHONE (OUTPATIENT)
Dept: EDUCATION SERVICES | Facility: CLINIC | Age: 45
End: 2020-09-03

## 2020-09-03 ENCOUNTER — VIRTUAL VISIT (OUTPATIENT)
Dept: EDUCATION SERVICES | Facility: CLINIC | Age: 45
End: 2020-09-03
Payer: COMMERCIAL

## 2020-09-03 DIAGNOSIS — E11.9 TYPE 2 DIABETES MELLITUS WITHOUT COMPLICATION, WITHOUT LONG-TERM CURRENT USE OF INSULIN (H): Chronic | ICD-10-CM

## 2020-09-03 DIAGNOSIS — E11.9 TYPE 2 DIABETES MELLITUS WITHOUT COMPLICATION, WITHOUT LONG-TERM CURRENT USE OF INSULIN (H): Primary | ICD-10-CM

## 2020-09-03 PROCEDURE — G0108 DIAB MANAGE TRN  PER INDIV: HCPCS | Mod: 95

## 2020-09-03 RX ORDER — METFORMIN HCL 500 MG
1000 TABLET, EXTENDED RELEASE 24 HR ORAL
Qty: 360 TABLET | Refills: 1 | Status: SHIPPED | OUTPATIENT
Start: 2020-09-03 | End: 2021-08-07

## 2020-09-03 RX ORDER — FLASH GLUCOSE SCANNING READER
EACH MISCELLANEOUS
Qty: 1 EACH | Refills: 0 | Status: SHIPPED | OUTPATIENT
Start: 2020-09-03 | End: 2022-05-12

## 2020-09-03 RX ORDER — FLASH GLUCOSE SENSOR
KIT MISCELLANEOUS
Qty: 2 EACH | Refills: 11 | Status: SHIPPED | OUTPATIENT
Start: 2020-09-03 | End: 2021-06-28

## 2020-09-03 NOTE — PATIENT INSTRUCTIONS
Decrease metformin to 2 tablets in the morning  Increase Tresiba insulin to 40 units at bedtime  Continue to work on diet & exercise adjustments to help control blood sugars  Katie will reach out to Dr. Mars for sign off on Freestyle Herminia continuous glucose monitor   Follow up in 1 month for review     Call or send a [email or web address] message with any questions or concerns    Katie Dacosta RD, LD, CDE   Diabetes Education Triage Line: [phone]  Diabetes Education Appointment [phone]

## 2020-09-03 NOTE — TELEPHONE ENCOUNTER
Dr. Mars,   I think patient would benefit from the use of the Freestyle Herminia CGMS to help check blood sugars more frequently. I've pended orders. Please sign off if you agree or provide an alternative plan.   Thank you!  Katie Dacosta, RD, LD, CDE

## 2020-09-03 NOTE — PROGRESS NOTES
Diabetes Self-Management Education & Support    Telephone Visit for: Follow-up    Patient verbally consented to the telephone visit service today: yes    SUBJECTIVE/OBJECTIVE:  Presents for: Follow-up  Accompanied by: Self  Diabetes education in the past 24mo: Yes  Focus of Visit: Taking Medication, Monitoring, Reducing Risks  Diabetes type: Type 2  Disease course: Improving  How confident are you filling out medical forms by yourself:: Not Assessed  Diabetes management related comments/concerns: My numbers are getting a lot better. I don't really want to be on insulin forever.  Transportation concerns: No  Other concerns:: None  Cultural Influences/Ethnic Background:  American      Diabetes Symptoms & Complications:     Complications assessed today?: No    Patient Problem List and Family Medical History reviewed for relevant medical history, current medical status, and diabetes risk factors.    Vitals:  There were no vitals taken for this visit.  Estimated body mass index is 42.18 kg/m  as calculated from the following:    Height as of 7/8/20: 1.829 m (6').    Weight as of 7/8/20: 141.1 kg (311 lb).   Last 3 BP:   BP Readings from Last 3 Encounters:   07/08/20 122/84   01/08/20 129/78   12/13/19 (!) 141/85       History   Smoking Status     Never Smoker   Smokeless Tobacco     Never Used       Labs:  Lab Results   Component Value Date    A1C 11.0 07/08/2020     Lab Results   Component Value Date     07/17/2020     Lab Results   Component Value Date    LDL 44 07/08/2020     HDL Cholesterol   Date Value Ref Range Status   07/08/2020 40 >39 mg/dL Final   ]  GFR Estimate   Date Value Ref Range Status   07/08/2020 >90 >60 mL/min/[1.73_m2] Final     Comment:     Non  GFR Calc  Starting 12/18/2018, serum creatinine based estimated GFR (eGFR) will be   calculated using the Chronic Kidney Disease Epidemiology Collaboration   (CKD-EPI) equation.       GFR Estimate If Black   Date Value Ref Range Status    07/08/2020 >90 >60 mL/min/[1.73_m2] Final     Comment:      GFR Calc  Starting 12/18/2018, serum creatinine based estimated GFR (eGFR) will be   calculated using the Chronic Kidney Disease Epidemiology Collaboration   (CKD-EPI) equation.       Lab Results   Component Value Date    CR 0.73 07/08/2020     No results found for: MICROALBUMIN    Healthy Eating:  Healthy Eating Assessed Today: Yes  Breakfast: [employer]'s coffee & sandwich  Lunch: fast food - Subway sandwich/wrap with lots of meat & veggies  Dinner: chicken, pork & beef, starch, veggies  Has patient met with a dietitian in the past?: Yes    Being Active:  Being Active Assessed Today: Yes  Exercise:: Currently not exercising(physical job so moving around a lot)  Barrier to exercise: Time    Monitoring:  Monitoring Assessed Today: Yes  Did patient bring glucose meter to appointment? : No  Times checking blood sugar at home (number): 1  Times checking blood sugar at home (per): Day  Blood glucose trend: Decreasing    9/3 - 128  9/2 - 154  9/1 - 153  8/31 - 136, 127 (b4 lunch)  8/30 - 142  8/29 - 134  8/28 - 136    Taking Medications:  Diabetes Medication(s)     Biguanides       metFORMIN (GLUCOPHAGE-XR) 500 MG 24 hr tablet    Take 2 tablets (1,000 mg) by mouth 2 times daily (with meals)    Insulin       insulin degludec (TRESIBA) 100 UNIT/ML pen    Inject 36 Units Subcutaneous At Bedtime    Sodium-Glucose Co-Transporter 2 (SGLT2) Inhibitors       dapagliflozin (FARXIGA) 5 MG TABS tablet    Take 1 tablet (5 mg) by mouth daily    Incretin Mimetic Agents (GLP-1 Receptor Agonists)       dulaglutide (TRULICITY) 1.5 MG/0.5ML pen    Inject 1.5 mg Subcutaneous every 7 days          Taking Medication Assessed Today: Yes  Current Treatments: Oral Medication (taken by mouth), Insulin Injections  Dose schedule: At bedtime, Pre-breakfast  Given by: Patient  Problems taking diabetes medications regularly?: Yes  Diabetes medication side effects?:  Yes    Problem Solving:  Problem Solving Assessed Today: Yes  Is the patient at risk for hypoglycemia?: Yes  Hypoglycemia Frequency: Never  Medical ID: No  Does patient have glucagon emergency kit?: No  Is the patient at risk for DKA?: No  Does patient have severe weather/disaster plan for diabetes management?: No  Does patient have sick day plan for diabetes management?: No    Reducing Risks:  Reducing Risks Assessed Today: No  Diabetes Risks: Sedentary Lifestyle, Family History  CAD Risks: Diabetes Mellitus, Family history, Male sex, Obesity, Sedentary lifestyle    Healthy Coping:  Healthy Coping Assessed Today: Yes  Emotional response to diabetes: Concern for health and well-being, Fear/Anxiety, Shock/Denial/Bargaining  Informal Support system:: Spouse  Stage of change: ACTION (Actively working towards change)  Support resources: None  Patient Activation Measure Survey Score:  MELANI Score (Last Two) 4/21/2016 7/8/2020   MELANI Raw Score 52 40   Activation Score 100 100   MELANI Level 4 4       Diabetes knowledge and skills assessment:   Patient is knowledgeable in diabetes management concepts related to: Monitoring and Taking Medication    Patient needs further education on the following diabetes management concepts: Healthy Eating, Being Active, Monitoring, Taking Medication, Problem Solving, Reducing Risks and Healthy Coping    Based on learning assessment above, most appropriate setting for further diabetes education would be: Group class or Individual setting.      INTERVENTIONS:    Education provided today on:  AADE Self-Care Behaviors:  Healthy Eating: weight reduction, eating out, portion control and discussed patient's ongoing issues limiting his interest in eating while at work  Being Active: relationship to blood glucose  Monitoring: log and interpret results, individual blood glucose targets, frequency of monitoring and discussed use of continuous glucose monitoring system, Freestyle Herminia, to allow for more  frequent monitoring + blood sugar trends   Taking Medication: action of prescribed medication, side effects of prescribed medications and when to take medications  Problem Solving: high blood glucose - causes, signs/symptoms, treatment and prevention and low blood glucose - causes, signs/symptoms, treatment and prevention  Reducing Risks: major complications of diabetes and prevention, early diagnostic measures and treatment of complications  Healthy Coping: benefits of making appropriate lifestyle changes    Opportunities for ongoing education and support in diabetes-self management were discussed.    Pt verbalized understanding of concepts discussed and recommendations provided today.       Education Materials Provided:  No new materials provided today      ASSESSMENT:  Patient sees improvement in his numbers with addition of Farxiga and increase in insulin. He complains of frequent diarrhea in the morning, preventing him from eating at times and making work particularly uncomfortable. He hopes to someday get off of insulin. Spent time discussing the progressive nature of diabetes, need to maintain good blood sugar control to prevent complications in the future. Patient is interested in utilizing CGM - discussed the benefits of this technology. Due to ongoing med side effects, discussed decreasing metformin dosing and increasing insulin and patient is agreeable.       Patient's most recent   Lab Results   Component Value Date    A1C 11.0 07/08/2020    is not meeting goal of <7.0    PLAN  Decrease metformin to 1000 mg/day   Increase Tresiba 0-0-0-36 --> 0-0-0-40, this represents a 10% increase in TDD  Recommend use of Freestyle Herminia 14-day CGMS. Will reach out to Endocrinology provider for sign off.   Follow up in 1 month for review    Katie Dacosta RD, LD, CDE   Time Spent: 50 minutes  Encounter Type: Individual, telephone    Any diabetes medication dose changes were made via the CDE Protocol and Collaborative  Practice Agreement with the patient's referring provider. A copy of this encounter was shared with the provider.

## 2020-09-09 NOTE — PROGRESS NOTES
Subjective     Norberto Doty is a 44 year old male with a history of diabetes, hyperlipidemia, DENISE, hypertension who presents to clinic today for the following health issues:    HPI       Rash  Onset/Duration: 1 month  Description  Location: left arm pit   Character: blotchy, red  Itching: mild  Intensity:  moderate  Progression of Symptoms:  improving  Accompanying signs and symptoms:   Fever: no  Body aches or joint pain: no  Sore throat symptoms: no  Recent cold symptoms: no  History:           Previous episodes of similar rash: None  New exposures:  None  Recent travel: no  Exposure to similar rash: no  Precipitating or alleviating factors:   Therapies tried and outcome: otc antibiotic ointment, cortisone - helping some         Review of Systems   Constitutional, HEENT, cardiovascular, pulmonary, gi and gu systems are negative, except as otherwise noted.      Objective    /84   Pulse 81   Temp 97.6  F (36.4  C) (Tympanic)   Resp 18   Ht 1.829 m (6')   Wt 142.4 kg (314 lb)   SpO2 96%   BMI 42.59 kg/m    Body mass index is 42.59 kg/m .  Physical Exam   GENERAL: healthy, alert and no distress  SKIN: Left axilla region has erythematous macular patch with no signs of infection.            Assessment & Plan       ICD-10-CM    1. Candidiasis of skin  B37.2 ketoconazole (NIZORAL) 2 % external cream   Talk to patient but his concerns.  We will get some ketoconazole cream to use twice a day for up to a month.  Warning signs side effects were discussed.  Follow-up as needed.  We talked about keeping skin dry.     Return in about 4 weeks (around 10/8/2020) for recheck, As Needed.    Tahir Hilton PA-C  [facility]

## 2020-09-10 ENCOUNTER — TELEPHONE (OUTPATIENT)
Dept: OPTOMETRY | Facility: CLINIC | Age: 45
End: 2020-09-10

## 2020-09-10 ENCOUNTER — OFFICE VISIT (OUTPATIENT)
Dept: FAMILY MEDICINE | Facility: CLINIC | Age: 45
End: 2020-09-10
Payer: COMMERCIAL

## 2020-09-10 VITALS
HEIGHT: 72 IN | WEIGHT: 314 LBS | OXYGEN SATURATION: 96 % | TEMPERATURE: 97.6 F | RESPIRATION RATE: 18 BRPM | HEART RATE: 81 BPM | SYSTOLIC BLOOD PRESSURE: 128 MMHG | BODY MASS INDEX: 42.53 KG/M2 | DIASTOLIC BLOOD PRESSURE: 84 MMHG

## 2020-09-10 DIAGNOSIS — B37.2 CANDIDIASIS OF SKIN: Primary | ICD-10-CM

## 2020-09-10 PROCEDURE — 99213 OFFICE O/P EST LOW 20 MIN: CPT | Performed by: PHYSICIAN ASSISTANT

## 2020-09-10 RX ORDER — KETOCONAZOLE 20 MG/G
CREAM TOPICAL 2 TIMES DAILY
Qty: 30 G | Refills: 0 | Status: SHIPPED | OUTPATIENT
Start: 2020-09-10 | End: 2020-10-10

## 2020-09-10 ASSESSMENT — MIFFLIN-ST. JEOR: SCORE: 2352.29

## 2020-09-10 NOTE — TELEPHONE ENCOUNTER
MITZI reordered lenses, this was a doctors change    I dispensed the new lenses on 9/10/2020  Haley Apple Optometric Assistant

## 2020-10-08 ENCOUNTER — VIRTUAL VISIT (OUTPATIENT)
Dept: EDUCATION SERVICES | Facility: CLINIC | Age: 45
End: 2020-10-08
Payer: COMMERCIAL

## 2020-10-08 DIAGNOSIS — E11.9 TYPE 2 DIABETES MELLITUS WITHOUT COMPLICATION, WITHOUT LONG-TERM CURRENT USE OF INSULIN (H): Chronic | ICD-10-CM

## 2020-10-08 DIAGNOSIS — E11.65 TYPE 2 DIABETES MELLITUS WITH HYPERGLYCEMIA, WITH LONG-TERM CURRENT USE OF INSULIN (H): Primary | Chronic | ICD-10-CM

## 2020-10-08 DIAGNOSIS — Z79.4 TYPE 2 DIABETES MELLITUS WITH HYPERGLYCEMIA, WITH LONG-TERM CURRENT USE OF INSULIN (H): Primary | Chronic | ICD-10-CM

## 2020-10-08 PROCEDURE — G0108 DIAB MANAGE TRN  PER INDIV: HCPCS | Mod: 95

## 2020-10-08 NOTE — PATIENT INSTRUCTIONS
Increase your Tresiba to 44 units at bedtime  Get your A1C rechecked  Follow up with your primary care provider and/or Endocrinology after labs are back    Call or send a ITN message with any questions or concerns    Katie Dacosta RD, LD, [facility]   Diabetes Education Triage Line: [phone]  Diabetes Education Appointment [phone]

## 2020-10-08 NOTE — PROGRESS NOTES
Diabetes Self-Management Education & Support    Presents for: Follow-up    Patient has given verbal consent for Video visit? Yes  Patient would like the video invitation sent by: Other e-mail: Emil    Type of service:  Video Visit  Originating Location (pt. Location): Home  Distant Location (provider location):  Home  Mode of Communication:  Video Conference via [employer]  Video Start Time: 1:36p  Video End Time (time video stopped): 2:06p  Patient would like to obtain AVS? Emil      SUBJECTIVE/OBJECTIVE:  Presents for: Follow-up  Accompanied by: Self  Diabetes education in the past 24mo: Yes  Focus of Visit: Taking Medication, Monitoring, Reducing Risks  Diabetes type: Type 2  Disease course: Improving  How confident are you filling out medical forms by yourself:: Not Assessed  Diabetes management related comments/concerns: Things are going good. The Herminia is better than having to poke my finger all of the time.  Transportation concerns: No  Difficulty affording diabetes medication?: No  Difficulty affording diabetes testing supplies?: No  Other concerns:: None  Cultural Influences/Ethnic Background:  American      Diabetes Symptoms & Complications:     Complications assessed today?: No    Patient Problem List and Family Medical History reviewed for relevant medical history, current medical status, and diabetes risk factors.    Vitals:  There were no vitals taken for this visit.  Estimated body mass index is 42.59 kg/m  as calculated from the following:    Height as of 9/10/20: 1.829 m (6').    Weight as of 9/10/20: 142.4 kg (314 lb).   Last 3 BP:   BP Readings from Last 3 Encounters:   09/10/20 128/84   07/08/20 122/84   01/08/20 129/78       History   Smoking Status     Never Smoker   Smokeless Tobacco     Never Used       Labs:  Lab Results   Component Value Date    A1C 11.0 07/08/2020     Lab Results   Component Value Date     07/17/2020     Lab Results   Component Value Date    LDL 44  07/08/2020     HDL Cholesterol   Date Value Ref Range Status   07/08/2020 40 >39 mg/dL Final   ]  GFR Estimate   Date Value Ref Range Status   07/08/2020 >90 >60 mL/min/[1.73_m2] Final     Comment:     Non  GFR Calc  Starting 12/18/2018, serum creatinine based estimated GFR (eGFR) will be   calculated using the Chronic Kidney Disease Epidemiology Collaboration   (CKD-EPI) equation.       GFR Estimate If Black   Date Value Ref Range Status   07/08/2020 >90 >60 mL/min/[1.73_m2] Final     Comment:      GFR Calc  Starting 12/18/2018, serum creatinine based estimated GFR (eGFR) will be   calculated using the Chronic Kidney Disease Epidemiology Collaboration   (CKD-EPI) equation.       Lab Results   Component Value Date    CR 0.73 07/08/2020     No results found for: MICROALBUMIN    Healthy Eating:  Healthy Eating Assessed Today: Yes  Breakfast: [employer] coffee & sandwich  Lunch: fast food - Subway sandwich/wrap with lots of meat & veggies  Dinner: chicken, pork & beef, starch, veggies  Other: challenging due to lots of birthdays in September, eating more cake than usual  Has patient met with a dietitian in the past?: Yes    Being Active:  Being Active Assessed Today: Yes  Exercise:: Currently not exercising(physical job so moving around a lot)  Barrier to exercise: Time    Monitoring:  Monitoring Assessed Today: Yes  Did patient bring glucose meter to appointment? : No  Blood Glucose Meter: CGM  Times checking blood sugar at home (number): 4  Times checking blood sugar at home (per): Day  Blood glucose trend: Decreasing    See LibreView report below     Taking Medications:  Diabetes Medication(s)     Biguanides       metFORMIN (GLUCOPHAGE-XR) 500 MG 24 hr tablet    Take 2 tablets (1,000 mg) by mouth daily (with breakfast)    Insulin       insulin degludec (TRESIBA) 100 UNIT/ML pen    Inject 40 Units Subcutaneous At Bedtime    Sodium-Glucose Co-Transporter 2 (SGLT2) Inhibitors        dapagliflozin (FARXIGA) 5 MG TABS tablet    Take 1 tablet (5 mg) by mouth daily    Incretin Mimetic Agents (GLP-1 Receptor Agonists)       dulaglutide (TRULICITY) 1.5 MG/0.5ML pen    Inject 1.5 mg Subcutaneous every 7 days          Taking Medication Assessed Today: Yes  Current Treatments: Oral Medication (taken by mouth), Insulin Injections  Dose schedule: At bedtime, Pre-breakfast  Given by: Patient  Problems taking diabetes medications regularly?: Yes  Diabetes medication side effects?: Yes(Improving with lower dose of metformin)    Problem Solving:  Problem Solving Assessed Today: Yes  Is the patient at risk for hypoglycemia?: Yes  Hypoglycemia Frequency: Never  Medical ID: No  Does patient have glucagon emergency kit?: No  Is the patient at risk for DKA?: No  Does patient have severe weather/disaster plan for diabetes management?: No  Does patient have sick day plan for diabetes management?: No    Reducing Risks:  Reducing Risks Assessed Today: No  Diabetes Risks: Sedentary Lifestyle, Family History  CAD Risks: Diabetes Mellitus, Family history, Male sex, Obesity, Sedentary lifestyle    Healthy Coping:  Healthy Coping Assessed Today: Yes  Emotional response to diabetes: Concern for health and well-being, Fear/Anxiety, Shock/Denial/Bargaining  Informal Support system:: Spouse  Stage of change: ACTION (Actively working towards change)  Support resources: None  Patient Activation Measure Survey Score:  MELANI Score (Last Two) 4/21/2016 7/8/2020   MELANI Raw Score 52 40   Activation Score 100 100   MELANI Level 4 4       Diabetes knowledge and skills assessment:   Patient is knowledgeable in diabetes management concepts related to: Healthy Eating, Monitoring, Taking Medication and Problem Solving    Patient needs further education on the following diabetes management concepts: Healthy Eating, Being Active, Monitoring, Taking Medication, Problem Solving, Reducing Risks and Healthy Coping    Based on learning assessment  above, most appropriate setting for further diabetes education would be: Group class or Individual setting.      INTERVENTIONS:    REPORTS:              Education provided today on:  AADE Self-Care Behaviors:  Monitoring: log and interpret results, individual blood glucose targets and frequency of monitoring  Taking Medication: side effects of prescribed medications and when to take medications  Reducing Risks: major complications of diabetes, prevention, early diagnostic measures and treatment of complications and A1C - goals, relating to blood glucose levels, how often to check    Pt verbalized understanding of concepts discussed and recommendations provided today.       Education Materials Provided:  No new materials provided today    ASSESSMENT:  Patient feels things are going well for him. He can feel the Herminia in his arm but still states it is better than poking his finger. He was able to link his Herminia to  Diabetes Ed practice to allow for viewing remotely. He thinks his diarrhea & indigestion have improved since decreasing his metformin dose to 1000 mg/day. He is still working on diet and lifestyle changes.     Glucose Patterns & Trends:  Hyperglycemia, weekend- nocturnal and weekday- nocturnal - waking up above goal range most mornings, very few significant post-prandial high blood sugars       PLAN  Increase Tresiba dosing 0-0-0-40 --> 0-0-0-44  Get A1C rechecked, orders were placed by Endocrinology  Establish care with new PCP  [facility]    Katie Dacosta RD, LD, [facility]   Time Spent: 30 minutes  Encounter Type: Individual    Any diabetes medication dose changes were made via the CDE Protocol and Collaborative Practice Agreement with the patient's referring provider. A copy of this encounter was shared with the provider.

## 2020-10-15 ENCOUNTER — OFFICE VISIT (OUTPATIENT)
Dept: OPTOMETRY | Facility: CLINIC | Age: 45
End: 2020-10-15
Payer: COMMERCIAL

## 2020-10-15 DIAGNOSIS — H52.13 MYOPIA OF BOTH EYES: Primary | ICD-10-CM

## 2020-10-15 DIAGNOSIS — H52.4 PRESBYOPIA: ICD-10-CM

## 2020-10-15 DIAGNOSIS — H52.223 REGULAR ASTIGMATISM OF BOTH EYES: ICD-10-CM

## 2020-10-15 PROCEDURE — 92310 CONTACT LENS FITTING OU: CPT | Mod: GA | Performed by: OPTOMETRIST

## 2020-10-15 PROCEDURE — 99207 PR NO CHARGE LOS: CPT | Performed by: OPTOMETRIST

## 2020-10-15 ASSESSMENT — REFRACTION_CURRENTRX
OD_SPHERE: -9.25
OS_CYLINDER: -2.00
OD_AXIS: 099
OD_DIAMETER: 9.5
OS_AXIS: 074
OD_CYLINDER: -1.25
OS_DIAMETER: 9.5
OD_AXIS: 099
OS_BASECURVE: 7.74
OS_BASECURVE: 7.74
OS_DIAMETER: 9.5
OD_BASECURVE: 7.85
OD_CYLINDER: -1.25
OS_SPHERE: -9.75
OS_SPHERE: -9.75
OS_CYLINDER: -2.00
OD_DIAMETER: 9.5
OS_AXIS: 074
OD_SPHERE: -9.75
OD_BASECURVE: 7.85

## 2020-10-15 ASSESSMENT — VISUAL ACUITY
OD_CC+: -1
CORRECTION_TYPE: CONTACTS
OS_CC: 20/20
OS_CC+: -1
OD_CC: 20/40
OD_CC: 20/100
METHOD: SNELLEN - LINEAR
OS_CC: 20/30

## 2020-10-15 NOTE — PROGRESS NOTES
Chief Complaint   Patient presents with     Contact Lens Check     Satisfied with contacts:  The contacts seem to be ok    Good comfort:  Yes, right one gets junky by the end of the day - needs to use eye drops to clear film ,  More aware of left lens - it moves too much  Clear vision:   Not happy with the near vision, also mentioned that the left lens has a glare on left side     Haley Apple Optometric Assistant     Too hard to wear them for work so only wore them on weekends.  Wants better near vision - recommended use of over the counter readers so he has best vision for drving.    After demo of both options patient decide to go with better driving vision with use  Of readers. Khadijah Rose O.D.           Medical, surgical and family histories reviewed and updated 10/15/2020.       OBJECTIVE: See Ophthalmology exam    ASSESSMENT:  No diagnosis found.   PLAN:    There are no Patient Instructions on file for this visit.

## 2020-10-16 ASSESSMENT — REFRACTION_MANIFEST
OS_SPHERE: +0.50
OD_SPHERE: +0.50
OS_SPHERE: -0.50

## 2020-10-16 ASSESSMENT — VISUAL ACUITY
VA_OR_OD_CURRENT_RX: 20/60
VA_OR_OS_CURRENT_RX: 20/25+3

## 2020-10-22 DIAGNOSIS — E11.65 TYPE 2 DIABETES MELLITUS WITH HYPERGLYCEMIA, WITH LONG-TERM CURRENT USE OF INSULIN (H): ICD-10-CM

## 2020-10-22 DIAGNOSIS — Z79.4 TYPE 2 DIABETES MELLITUS WITH HYPERGLYCEMIA, WITH LONG-TERM CURRENT USE OF INSULIN (H): ICD-10-CM

## 2020-10-22 LAB
ANION GAP SERPL CALCULATED.3IONS-SCNC: 2 MMOL/L (ref 3–14)
BUN SERPL-MCNC: 16 MG/DL (ref 7–30)
CALCIUM SERPL-MCNC: 8.9 MG/DL (ref 8.5–10.1)
CHLORIDE SERPL-SCNC: 107 MMOL/L (ref 94–109)
CO2 SERPL-SCNC: 31 MMOL/L (ref 20–32)
CREAT SERPL-MCNC: 0.76 MG/DL (ref 0.66–1.25)
GFR SERPL CREATININE-BSD FRML MDRD: >90 ML/MIN/{1.73_M2}
GLUCOSE SERPL-MCNC: 138 MG/DL (ref 70–99)
HBA1C MFR BLD: 7.1 % (ref 0–5.6)
POTASSIUM SERPL-SCNC: 4.4 MMOL/L (ref 3.4–5.3)
SODIUM SERPL-SCNC: 140 MMOL/L (ref 133–144)

## 2020-10-22 PROCEDURE — 83036 HEMOGLOBIN GLYCOSYLATED A1C: CPT | Performed by: INTERNAL MEDICINE

## 2020-10-22 PROCEDURE — 80048 BASIC METABOLIC PNL TOTAL CA: CPT | Performed by: INTERNAL MEDICINE

## 2020-11-08 ENCOUNTER — HEALTH MAINTENANCE LETTER (OUTPATIENT)
Age: 45
End: 2020-11-08

## 2020-12-22 ENCOUNTER — TELEPHONE (OUTPATIENT)
Dept: OPTOMETRY | Facility: CLINIC | Age: 45
End: 2020-12-22

## 2020-12-22 NOTE — TELEPHONE ENCOUNTER
Caridad called today asking if contacts have come in?  They are in and he can come in to pick them up at any time -I listed our holiday hours.  He must leave old contacts when he picks these up.

## 2020-12-29 DIAGNOSIS — E11.9 TYPE 2 DIABETES MELLITUS WITHOUT COMPLICATION, WITHOUT LONG-TERM CURRENT USE OF INSULIN (H): Chronic | ICD-10-CM

## 2020-12-31 RX ORDER — ATORVASTATIN CALCIUM 40 MG/1
TABLET, FILM COATED ORAL
Qty: 90 TABLET | Refills: 1 | Status: SHIPPED | OUTPATIENT
Start: 2020-12-31 | End: 2021-06-09

## 2021-01-20 NOTE — TELEPHONE ENCOUNTER
1/20/2021  I have tried to call patient several times but finally got hold of him today.  He stated his schedule has been CRAZY and he's been out of town a lot, but that he is going to try to stop in early next week to swap out the contacts.  I let him know that there is someone here Mon- Friday and that he can stop in anytime, that any one of us can help him.    Haley Miller Optometric Assistant

## 2021-02-19 DIAGNOSIS — E11.9 TYPE 2 DIABETES MELLITUS WITHOUT COMPLICATION, WITHOUT LONG-TERM CURRENT USE OF INSULIN (H): Chronic | ICD-10-CM

## 2021-02-23 NOTE — TELEPHONE ENCOUNTER
Routing refill request to provider for review/approval because:  Patient needs to be seen because:  Last visit was over 6 months ago.    Endocrine MA, pleased call to schedule patent an appointment, thanks!    Ronald MENDOSA RN....2/23/2021 1:40 PM

## 2021-02-23 NOTE — TELEPHONE ENCOUNTER
Called patient and left VM, informed that he is overdue for a follow up. Requested he call the clinic to make an appointment. Clinic number provided.     Angie CARR MA

## 2021-05-04 DIAGNOSIS — E11.9 TYPE 2 DIABETES MELLITUS WITHOUT COMPLICATION, WITHOUT LONG-TERM CURRENT USE OF INSULIN (H): Chronic | ICD-10-CM

## 2021-05-04 RX ORDER — INSULIN DEGLUDEC 100 U/ML
44 INJECTION, SOLUTION SUBCUTANEOUS AT BEDTIME
Qty: 45 ML | Refills: 0 | Status: SHIPPED | OUTPATIENT
Start: 2021-05-04 | End: 2021-08-09

## 2021-05-19 DIAGNOSIS — E11.9 TYPE 2 DIABETES MELLITUS WITHOUT COMPLICATION, WITHOUT LONG-TERM CURRENT USE OF INSULIN (H): Chronic | ICD-10-CM

## 2021-05-20 RX ORDER — PEN NEEDLE, DIABETIC 32GX 5/32"
NEEDLE, DISPOSABLE MISCELLANEOUS
Qty: 100 EACH | Refills: 0 | OUTPATIENT
Start: 2021-05-20

## 2021-05-20 RX ORDER — DULAGLUTIDE 1.5 MG/.5ML
INJECTION, SOLUTION SUBCUTANEOUS
Qty: 8 ML | Refills: 0 | OUTPATIENT
Start: 2021-05-20

## 2021-05-20 RX ORDER — LISINOPRIL 2.5 MG/1
TABLET ORAL
Qty: 30 TABLET | Refills: 0 | OUTPATIENT
Start: 2021-05-20

## 2021-05-20 NOTE — TELEPHONE ENCOUNTER
Routing refill request to provider for review/approval because:  Labs not current:  Hgb A1C  Patient needs to be seen because:  Pt is overdue for a diabetes follow-up appointment.    RHETT LoganN, RN

## 2021-05-20 NOTE — TELEPHONE ENCOUNTER
Pt has scheduled a diabetes f/u. Pt's wife is wondering if he can get a makayla refill until the appointment because they will be out of tow next week

## 2021-05-22 ENCOUNTER — HEALTH MAINTENANCE LETTER (OUTPATIENT)
Age: 46
End: 2021-05-22

## 2021-05-22 DIAGNOSIS — E11.9 TYPE 2 DIABETES MELLITUS WITHOUT COMPLICATION, WITHOUT LONG-TERM CURRENT USE OF INSULIN (H): Chronic | ICD-10-CM

## 2021-05-24 RX ORDER — DULAGLUTIDE 1.5 MG/.5ML
INJECTION, SOLUTION SUBCUTANEOUS
Qty: 24 ML | Refills: 0 | Status: SHIPPED | OUTPATIENT
Start: 2021-05-24 | End: 2022-04-12

## 2021-05-24 RX ORDER — LISINOPRIL 2.5 MG/1
TABLET ORAL
Qty: 30 TABLET | Refills: 0 | Status: SHIPPED | OUTPATIENT
Start: 2021-05-24 | End: 2021-06-09

## 2021-05-24 NOTE — TELEPHONE ENCOUNTER
"Next 5 appointments (look out 90 days)    Jun 02, 2021  2:30 PM  Office Visit with Triston Hills MD  [facility] ([facility] ) [address] [facility] [address] [phone]        Requested Prescriptions   Pending Prescriptions Disp Refills    TRULICITY 1.5 MG/0.5ML pen [Pharmacy Med Name: Trulicity 1.5 MG/0.5ML Subcutaneous Solution Pen-injector] 24 mL 0     Sig: INJECT 1.5 MG SUBCUTANEOUSLY EVERY 7 DAYS       GLP-1 Agonists Protocol Failed - 5/22/2021 10:29 AM        Failed - HgbA1C in past 3 or 6 months     If HgbA1C is 8 or greater, it needs to be on file within the past 3 months.  If less than 8, must be on file within the past 6 months.     Recent Labs   Lab Test 10/22/20  0819   A1C 7.1*             Passed - Medication is active on med list        Passed - Patient is age 18 or older        Passed - Normal serum creatinine on file in past 12 months     Recent Labs   Lab Test 10/22/20  0819   CR 0.76       Ok to refill medication if creatinine is low          Passed - Recent (6 mo) or future (30 days) visit within the authorizing provider's specialty     Patient had office visit in the last 6 months or has a visit in the next 30 days with authorizing provider.  See \"Patient Info\" tab in inbasket, or \"Choose Columns\" in Meds & Orders section of the refill encounter.              lisinopril (ZESTRIL) 2.5 MG tablet [Pharmacy Med Name: Lisinopril 2.5 MG Oral Tablet] 90 tablet 0     Sig: Take 1 tablet by mouth once daily       ACE Inhibitors (Including Combos) Protocol Passed - 5/22/2021 10:29 AM        Passed - Blood pressure under 140/90 in past 12 months     BP Readings from Last 3 Encounters:   09/10/20 128/84   07/08/20 122/84   01/08/20 129/78                 Passed - Recent (12 mo) or future (30 days) visit within the authorizing provider's specialty     Patient has had an office visit with the authorizing provider or a provider within the " "authorizing providers department within the previous 12 mos or has a future within next 30 days. See \"Patient Info\" tab in inbasket, or \"Choose Columns\" in Meds & Orders section of the refill encounter.              Passed - Medication is active on med list        Passed - Patient is age 18 or older        Passed - Normal serum creatinine on file in past 12 months     Recent Labs   Lab Test 10/22/20  0819   CR 0.76       Ok to refill medication if creatinine is low          Passed - Normal serum potassium on file in past 12 months     Recent Labs   Lab Test 10/22/20  0819   POTASSIUM 4.4                  "

## 2021-06-02 ENCOUNTER — OFFICE VISIT (OUTPATIENT)
Dept: OPTOMETRY | Facility: CLINIC | Age: 46
End: 2021-06-02
Payer: COMMERCIAL

## 2021-06-02 ENCOUNTER — OFFICE VISIT (OUTPATIENT)
Dept: FAMILY MEDICINE | Facility: CLINIC | Age: 46
End: 2021-06-02
Payer: COMMERCIAL

## 2021-06-02 VITALS
BODY MASS INDEX: 42.66 KG/M2 | WEIGHT: 315 LBS | DIASTOLIC BLOOD PRESSURE: 75 MMHG | OXYGEN SATURATION: 96 % | HEART RATE: 75 BPM | HEIGHT: 72 IN | TEMPERATURE: 98 F | SYSTOLIC BLOOD PRESSURE: 130 MMHG

## 2021-06-02 DIAGNOSIS — H52.4 PRESBYOPIA: ICD-10-CM

## 2021-06-02 DIAGNOSIS — E66.01 CLASS 3 SEVERE OBESITY DUE TO EXCESS CALORIES WITH BODY MASS INDEX (BMI) OF 40.0 TO 44.9 IN ADULT, UNSPECIFIED WHETHER SERIOUS COMORBIDITY PRESENT (H): ICD-10-CM

## 2021-06-02 DIAGNOSIS — N52.9 ERECTILE DYSFUNCTION, UNSPECIFIED ERECTILE DYSFUNCTION TYPE: ICD-10-CM

## 2021-06-02 DIAGNOSIS — E11.3293 TYPE 2 DIABETES MELLITUS WITH MILD NONPROLIFERATIVE RETINOPATHY OF BOTH EYES WITHOUT MACULAR EDEMA, UNSPECIFIED WHETHER LONG TERM INSULIN USE (H): ICD-10-CM

## 2021-06-02 DIAGNOSIS — H52.13 MYOPIA OF BOTH EYES: Primary | ICD-10-CM

## 2021-06-02 DIAGNOSIS — H52.223 REGULAR ASTIGMATISM OF BOTH EYES: ICD-10-CM

## 2021-06-02 DIAGNOSIS — E11.65 TYPE 2 DIABETES MELLITUS WITH HYPERGLYCEMIA, WITHOUT LONG-TERM CURRENT USE OF INSULIN (H): Primary | ICD-10-CM

## 2021-06-02 DIAGNOSIS — E66.813 CLASS 3 SEVERE OBESITY DUE TO EXCESS CALORIES WITH BODY MASS INDEX (BMI) OF 40.0 TO 44.9 IN ADULT, UNSPECIFIED WHETHER SERIOUS COMORBIDITY PRESENT (H): ICD-10-CM

## 2021-06-02 LAB
ALBUMIN SERPL-MCNC: 4.3 G/DL (ref 3.4–5)
ALP SERPL-CCNC: 94 U/L (ref 40–150)
ALT SERPL W P-5'-P-CCNC: 87 U/L (ref 0–70)
ANION GAP SERPL CALCULATED.3IONS-SCNC: 6 MMOL/L (ref 3–14)
AST SERPL W P-5'-P-CCNC: 28 U/L (ref 0–45)
BILIRUB SERPL-MCNC: 1 MG/DL (ref 0.2–1.3)
BUN SERPL-MCNC: 16 MG/DL (ref 7–30)
CALCIUM SERPL-MCNC: 9.3 MG/DL (ref 8.5–10.1)
CHLORIDE SERPL-SCNC: 104 MMOL/L (ref 94–109)
CHOLEST SERPL-MCNC: 154 MG/DL
CO2 SERPL-SCNC: 28 MMOL/L (ref 20–32)
CREAT SERPL-MCNC: 0.87 MG/DL (ref 0.66–1.25)
GFR SERPL CREATININE-BSD FRML MDRD: >90 ML/MIN/{1.73_M2}
GLUCOSE SERPL-MCNC: 222 MG/DL (ref 70–99)
HBA1C MFR BLD: 9 % (ref 0–5.6)
HDLC SERPL-MCNC: 48 MG/DL
LDLC SERPL CALC-MCNC: 54 MG/DL
NONHDLC SERPL-MCNC: 106 MG/DL
POTASSIUM SERPL-SCNC: 3.9 MMOL/L (ref 3.4–5.3)
PROT SERPL-MCNC: 7.9 G/DL (ref 6.8–8.8)
SODIUM SERPL-SCNC: 138 MMOL/L (ref 133–144)
TRIGL SERPL-MCNC: 260 MG/DL

## 2021-06-02 PROCEDURE — 83036 HEMOGLOBIN GLYCOSYLATED A1C: CPT | Performed by: FAMILY MEDICINE

## 2021-06-02 PROCEDURE — 80053 COMPREHEN METABOLIC PANEL: CPT | Performed by: FAMILY MEDICINE

## 2021-06-02 PROCEDURE — 99207 PR NO CHARGE LOS: CPT | Performed by: OPTOMETRIST

## 2021-06-02 PROCEDURE — 99214 OFFICE O/P EST MOD 30 MIN: CPT | Performed by: FAMILY MEDICINE

## 2021-06-02 PROCEDURE — 36415 COLL VENOUS BLD VENIPUNCTURE: CPT | Performed by: FAMILY MEDICINE

## 2021-06-02 PROCEDURE — 80061 LIPID PANEL: CPT | Performed by: FAMILY MEDICINE

## 2021-06-02 RX ORDER — TADALAFIL 5 MG/1
5 TABLET ORAL EVERY 24 HOURS
Qty: 30 TABLET | Refills: 0 | Status: SHIPPED | OUTPATIENT
Start: 2021-06-02 | End: 2021-07-10

## 2021-06-02 ASSESSMENT — REFRACTION_CURRENTRX
OS_DIAMETER: 9.5
OD_AXIS: 099
OD_BASECURVE: 7.85
OD_DIAMETER: 9.5
OS_AXIS: 074
OD_CYLINDER: -1.25
OS_BASECURVE: 7.74
OD_SPHERE: -9.25
OS_SPHERE: -9.75
OS_CYLINDER: -2.00

## 2021-06-02 ASSESSMENT — VISUAL ACUITY
METHOD: SNELLEN - LINEAR
OS_CC: 20/30
CORRECTION_TYPE: CONTACTS
OS_CC: 20/50
OD_CC: 20/40
OS_CC+: -1
OD_CC: 20/100

## 2021-06-02 ASSESSMENT — MIFFLIN-ST. JEOR: SCORE: 2388.12

## 2021-06-02 NOTE — PROGRESS NOTES
Chief Complaint   Patient presents with     Contact Lens Check     CL check/ Walk In, Ok per KGM     Satisfied with contacts:  The left eye has some glare or build up     Good comfort:  Yes  Clear vision:     He mentioned that his A1C is high, so it might be that, but he doesn't feel like the near vision is great     Haley Miller Optometric Assistant           Medical, surgical and family histories reviewed and updated 6/2/2021.       OBJECTIVE: See Ophthalmology exam    ASSESSMENT:  No diagnosis found.   PLAN:    There are no Patient Instructions on file for this visit.

## 2021-06-02 NOTE — PROGRESS NOTES
SUBJECTIVE:  Norberto Doty presents today for follow up of DIABETES MELLITUS  ED trouble. Can achieve erection but will not sustain it.  Has tried Viagra and has not been able to sustain the erection.    Patient glucose self monitoring: one time daily, once daily.  Blood glucose averages: 200-300  Symptoms of low blood sugar (hypoglycemia)? n  Problems taking medications regularly? Not taking Tresiba all the times   Side effects? diarrhea  What are you doing for exercise outside of work or your daily activities? Work only  Reviewed health maintenance  Patient Active Problem List   Diagnosis     Punctate keratitis, bilateral     Uncontrolled diabetes mellitus type 2 without complications     Morbid obesity due to excess calories (H)     Mixed hyperlipidemia     Essential hypertension     Type 2 diabetes mellitus (H)     DENISE (obstructive sleep apnea)       Smokes n  PHQ-2  Over the last two weeks- Have you been bothered by little interest or pleasure in doing things?  No  Over the last two weeks- Have you been been feeling down, depressed, or hopeless?  No    BP:   BP Readings from Last 3 Encounters:   06/02/21 130/75   09/10/20 128/84   07/08/20 122/84       Lab Results   Component Value Date    A1C 9.0 06/02/2021    A1C 7.1 10/22/2020    A1C 11.0 07/08/2020       Recent Labs   Lab Test 07/08/20  0825 07/02/19  0812   CHOL 130 98   HDL 40 49   LDL 44 31   TRIG 230* 91       Wt Readings from Last 3 Encounters:   06/02/21 146.5 kg (323 lb)   09/10/20 142.4 kg (314 lb)   07/08/20 141.1 kg (311 lb)     Asa is included in med list    Current Outpatient Medications   Medication Sig Dispense Refill     ACCU-CHEK GUIDE test strip USE  THREE TIMES DAILY OR  AS  DIRECTED 300 strip 1     aspirin  MG tablet Take 1 tablet (325 mg) by mouth daily 90 tablet 3     atorvastatin (LIPITOR) 40 MG tablet Take 1 tablet by mouth once daily 90 tablet 1     blood glucose monitoring (ACCU-CHEK FASTCLIX) lancets Use to test blood  sugar 3 times daily or as directed.  Ok to substitute alternative if insurance prefers. 102 each 11     Carboxymeth-Glycerin-Polysorb (REFRESH OPTIVE ADVANCED) 0.5-1-0.5 % SOLN Apply 1 drop to eye 2 times daily       Continuous Blood Gluc  (FREESTYLE CELINA 14 DAY READER) WEST Use to read blood sugars as per 's instructions. 1 each 0     Continuous Blood Gluc Sensor (FREESTYLE CELINA 14 DAY SENSOR) MISC Change every 14 days. 2 each 11     dapagliflozin (FARXIGA) 5 MG TABS tablet Take 1 tablet (5 mg) by mouth daily 30 tablet 11     ibuprofen (ADVIL/MOTRIN) 600 MG tablet TAKE 1 TABLET BY MOUTH THREE TIMES DAILY WITH MEALS FOR 10 DAYS . MAXIMUM OF 3200 MG IN 24 HOURS.  0     insulin degludec (TRESIBA FLEXTOUCH) 100 UNIT/ML pen Inject 44 Units Subcutaneous At Bedtime 45 mL 0     insulin pen needle (BD RAMILA U/F) 32G X 4 MM miscellaneous Use 1 daily as directed. 100 each 3     insulin pen needle (BD RAMILA U/F) 32G X 4 MM Use 1 daily as directed. 100 each 1     lisinopril (ZESTRIL) 2.5 MG tablet Take 1 tablet by mouth once daily 30 tablet 0     metFORMIN (GLUCOPHAGE-XR) 500 MG 24 hr tablet Take 2 tablets (1,000 mg) by mouth daily (with breakfast) 360 tablet 1     sildenafil (REVATIO) 20 MG tablet May take 1-5 pills one hour before intercourse 50 tablet 3     TRULICITY 1.5 MG/0.5ML pen INJECT 1.5 MG SUBCUTANEOUSLY EVERY 7 DAYS 24 mL 0       Histories reviewed and updated in Epic.      EXAM:  Vitals: /75   Pulse 75   Temp 98  F (36.7  C) (Tympanic)   Ht 1.829 m (6')   Wt 146.5 kg (323 lb)   SpO2 96%   BMI 43.81 kg/m    BMIE= Body mass index is 43.81 kg/m .  GENERAL APPEARANCE: alert and no acute distress  PSYCH: mentation appears normal and affect normal/bright  RESP: lungs clear to auscultation - no rales, rhonchi or wheezes  CV: regular rate and rhythm, normal S1 S2, no S3 or S4 and no murmur, click or rub -  EXT: no cyanosis or edema in lower extremities  SKIN: no venous stasis changes     ICD-10-CM    1. Type 2 diabetes mellitus with hyperglycemia, without long-term current use of insulin (H)  E11.65 Hemoglobin A1c     Comprehensive metabolic panel (BMP + Alb, Alk Phos, ALT, AST, Total. Bili, TP)     Lipid panel reflex to direct LDL Fasting   2. Class 3 severe obesity due to excess calories with body mass index (BMI) of 40.0 to 44.9 in adult, unspecified whether serious comorbidity present (H)  E66.01     Z68.41     PLAN:follow up with Endocrinology  Compliance with medication  Increase to 50 units of Tresiba.

## 2021-06-02 NOTE — PATIENT INSTRUCTIONS
Use +1.25 to +1.75 over the counter readers with contact lenses to see at near  Left lens was cleaned today  No change in RGP's needed.  Return to clinic for refraction for glasses     Khadijah Rose O.D.  [facility]  [address] [place] [address] [phone]

## 2021-06-02 NOTE — LETTER
6/2/2021         RE: Norberto Doty  [address] [facility] [address]        Dear Colleague,    Thank you for referring your patient, Norberto Doty, to the [facility]. Please see a copy of my visit note below.    Chief Complaint   Patient presents with     Contact Lens Check     CL check/ Walk In, Ok per KGM     Satisfied with contacts:  The left eye has some glare or build up     Good comfort:  Yes  Clear vision:     He mentioned that his A1C is high, so it might be that, but he doesn't feel like the near vision is great     Haley Miller Optometric Assistant           Medical, surgical and family histories reviewed and updated 6/2/2021.       OBJECTIVE: See Ophthalmology exam    ASSESSMENT:  No diagnosis found.   PLAN:    There are no Patient Instructions on file for this visit.                     Again, thank you for allowing me to participate in the care of your patient.        Sincerely,        Khadijah Rose, OD

## 2021-06-04 ENCOUNTER — TELEPHONE (OUTPATIENT)
Dept: ENDOCRINOLOGY | Facility: CLINIC | Age: 46
End: 2021-06-04

## 2021-06-04 NOTE — TELEPHONE ENCOUNTER
I spoke to Norberto today. His wife had left a message to see if any labs should be done prior to his appointment with Dr Mars on 6-28-21. I see that his primary had recently ordered A1C, CMP, and Lipids.  I told Norberto that since it has been almost a year, it is likely that Dr Mars will want to do a full assessment and at that time determine labs that would be beneficial.   I told Norberto that I would be happy to send a message if he would like. He decided that it would make sense to wait for the visit. Nadia MENDOSA Rn

## 2021-06-04 NOTE — TELEPHONE ENCOUNTER
[facility] Call Center    Phone Message    May a detailed message be left on voicemail: yes     Reason for Call: Order(s): Other:   Reason for requested: labs for upcoming appt  Date needed: whenever possible   Provider name: Madisyn    Pt's wife is requesting orders for any necessary labs so pt can complete prior to appt on 6/28/21.  Please review.     Dr. Hills had pt complete a few labs for appt completed on 6/2/21.  If add'l labs are needed please place orders and call pt so they can get appt scheduled.       Action Taken: Message routed to:  Other: endo    Travel Screening: Not Applicable

## 2021-06-07 ENCOUNTER — OFFICE VISIT (OUTPATIENT)
Dept: OPTOMETRY | Facility: CLINIC | Age: 46
End: 2021-06-07
Payer: COMMERCIAL

## 2021-06-07 DIAGNOSIS — Z79.4 TYPE 2 DIABETES MELLITUS WITH BOTH EYES AFFECTED BY MILD NONPROLIFERATIVE RETINOPATHY WITHOUT MACULAR EDEMA, WITH LONG-TERM CURRENT USE OF INSULIN (H): Primary | ICD-10-CM

## 2021-06-07 DIAGNOSIS — H52.4 PRESBYOPIA: ICD-10-CM

## 2021-06-07 DIAGNOSIS — H52.223 REGULAR ASTIGMATISM OF BOTH EYES: ICD-10-CM

## 2021-06-07 DIAGNOSIS — E11.3293 TYPE 2 DIABETES MELLITUS WITH BOTH EYES AFFECTED BY MILD NONPROLIFERATIVE RETINOPATHY WITHOUT MACULAR EDEMA, WITH LONG-TERM CURRENT USE OF INSULIN (H): Primary | ICD-10-CM

## 2021-06-07 DIAGNOSIS — H52.13 MYOPIA OF BOTH EYES: ICD-10-CM

## 2021-06-07 PROCEDURE — 92015 DETERMINE REFRACTIVE STATE: CPT | Performed by: OPTOMETRIST

## 2021-06-07 PROCEDURE — 92012 INTRM OPH EXAM EST PATIENT: CPT | Performed by: OPTOMETRIST

## 2021-06-07 ASSESSMENT — REFRACTION_MANIFEST
OD_AXIS: 022
OD_ADD: +1.75
OS_SPHERE: -13.25
OD_AXIS: 005
OD_SPHERE: -12.25
OS_AXIS: 170
OS_AXIS: 151
OS_CYLINDER: +1.25
OD_SPHERE: -11.25
OS_CYLINDER: +1.50
METHOD_AUTOREFRACTION: 1
OD_CYLINDER: +0.75
OD_CYLINDER: +0.75
OS_SPHERE: -12.50
OS_ADD: +1.75

## 2021-06-07 ASSESSMENT — REFRACTION_CURRENTRX
OS_SPHERE: -9.75
OS_AXIS: 074
OD_DIAMETER: 9.5
OD_CYLINDER: -1.25
OD_AXIS: 099
OD_SPHERE: -9.25
OS_BASECURVE: 7.74
OD_BASECURVE: 7.85
OS_DIAMETER: 9.5
OS_CYLINDER: -2.00

## 2021-06-07 ASSESSMENT — CUP TO DISC RATIO
OD_RATIO: 0.2
OS_RATIO: 0.2

## 2021-06-07 ASSESSMENT — SLIT LAMP EXAM - LIDS
COMMENTS: 2+ PTOSIS
COMMENTS: 1+ PTOSIS

## 2021-06-07 ASSESSMENT — VISUAL ACUITY
OS_CC: 20/40
CORRECTION_TYPE: CONTACTS
OS_CC: 20/30
OD_CC: 20/70-2
OD_CC: 20/40
METHOD: SNELLEN - LINEAR
OD_CC+: -1

## 2021-06-07 ASSESSMENT — KERATOMETRY
OS_AXISANGLE2_DEGREES: 20
OD_K2POWER_DIOPTERS: 44.50
OS_K2POWER_DIOPTERS: 43.75
OS_K1POWER_DIOPTERS: 43.25
OD_AXISANGLE2_DEGREES: 151
OD_K1POWER_DIOPTERS: 43.00

## 2021-06-07 ASSESSMENT — TONOMETRY
OS_IOP_MMHG: 12
IOP_METHOD: APPLANATION
OD_IOP_MMHG: 12

## 2021-06-07 ASSESSMENT — EXTERNAL EXAM - LEFT EYE: OS_EXAM: NORMAL

## 2021-06-07 ASSESSMENT — EXTERNAL EXAM - RIGHT EYE: OD_EXAM: NORMAL

## 2021-06-07 NOTE — LETTER
6/7/2021         RE: Norberto Doty  [address] [address]        Dear Colleague,    Thank you for referring your patient, Norberto Doty, to the [facility]. Mild non proliferative diabetic retinopathy in both eyes.     Again, thank you for allowing me to participate in the care of your patient.        Sincerely,        Khadijah Rose OD

## 2021-06-07 NOTE — PROGRESS NOTES
Chief Complaint   Patient presents with     Dilation, refraction due to diabetes    diabetes 2 with use of insulin    Wants glasses prescription so he can take a break from wearing contact lenses   Hemoglobin A1C   Date Value Ref Range Status   06/02/2021 9.0 (H) 0 - 5.6 % Final     Comment:     Normal <5.7% Prediabetes 5.7-6.4%  Diabetes 6.5% or higher - adopted from ADA   consensus guidelines.     10/22/2020 7.1 (H) 0 - 5.6 % Final     Comment:     Results confirmed by repeat test  Normal <5.7% Prediabetes 5.7-6.4%  Diabetes 6.5% or higher - adopted from ADA   consensus guidelines.     07/08/2020 11.0 (H) 0 - 5.6 % Final     Comment:     Normal <5.7% Prediabetes 5.7-6.4%  Diabetes 6.5% or higher - adopted from ADA   consensus guidelines.  Results confirmed by repeat test           Haley Apple Optometric Assistant     Medical, surgical and family histories reviewed and updated 6/7/2021.      OBJECTIVE: See Ophthalmology exam    ASSESSMENT:    ICD-10-CM    1. Type 2 diabetes mellitus with both eyes affected by mild nonproliferative retinopathy without macular edema, with long-term current use of insulin (H)  E11.3293 REFRACTIVE STATUS    Z79.4    2. Myopia of both eyes  H52.13 REFRACTIVE STATUS   3. Regular astigmatism of both eyes  H52.223 REFRACTIVE STATUS   4. Presbyopia  H52.4 REFRACTIVE STATUS      PLAN:    Patient Instructions   Fill glasses prescription  Allow 2 weeks to adapt to change in glasses  Work on improving blood sugar control  Return in 1 year for diabetic eye exam      Blood sugar and blood pressure control are very important in the prevention of ocular complications from diabetes.   Khadijah Rose, OD  [phone]

## 2021-06-07 NOTE — PATIENT INSTRUCTIONS
Fill glasses prescription  Allow 2 weeks to adapt to change in glasses  Work on improving blood sugar control  Return in 1 year for diabetic eye exam      Blood sugar and blood pressure control are very important in the prevention of ocular complications from diabetes.   Khadijah Rose, CONRAD  [phone]

## 2021-06-09 DIAGNOSIS — Z79.4 TYPE 2 DIABETES MELLITUS WITH HYPERGLYCEMIA, WITH LONG-TERM CURRENT USE OF INSULIN (H): ICD-10-CM

## 2021-06-09 DIAGNOSIS — E11.65 TYPE 2 DIABETES MELLITUS WITH HYPERGLYCEMIA, WITH LONG-TERM CURRENT USE OF INSULIN (H): ICD-10-CM

## 2021-06-09 DIAGNOSIS — E11.9 TYPE 2 DIABETES MELLITUS WITHOUT COMPLICATION, WITHOUT LONG-TERM CURRENT USE OF INSULIN (H): Chronic | ICD-10-CM

## 2021-06-09 RX ORDER — LISINOPRIL 2.5 MG/1
TABLET ORAL
Qty: 90 TABLET | Refills: 1 | Status: SHIPPED | OUTPATIENT
Start: 2021-06-09 | End: 2021-11-12

## 2021-06-09 RX ORDER — ATORVASTATIN CALCIUM 40 MG/1
TABLET, FILM COATED ORAL
Qty: 90 TABLET | Refills: 3 | Status: SHIPPED | OUTPATIENT
Start: 2021-06-09 | End: 2022-04-12

## 2021-06-09 RX ORDER — PEN NEEDLE, DIABETIC 32GX 5/32"
NEEDLE, DISPOSABLE MISCELLANEOUS
Qty: 100 EACH | Refills: 3 | Status: SHIPPED | OUTPATIENT
Start: 2021-06-09 | End: 2022-04-12

## 2021-06-10 RX ORDER — DAPAGLIFLOZIN 5 MG/1
TABLET, FILM COATED ORAL
Qty: 30 TABLET | Refills: 0 | Status: SHIPPED | OUTPATIENT
Start: 2021-06-10 | End: 2022-05-12

## 2021-06-10 NOTE — TELEPHONE ENCOUNTER
Prescription approved per [facility] Refill Protocol.    Ronald MENDOSA RN....6/10/2021 9:53 AM

## 2021-06-25 ENCOUNTER — TRANSFERRED RECORDS (OUTPATIENT)
Dept: HEALTH INFORMATION MANAGEMENT | Facility: CLINIC | Age: 46
End: 2021-06-25

## 2021-06-26 DIAGNOSIS — E11.65 TYPE 2 DIABETES MELLITUS WITH HYPERGLYCEMIA, WITH LONG-TERM CURRENT USE OF INSULIN (H): ICD-10-CM

## 2021-06-26 DIAGNOSIS — Z79.4 TYPE 2 DIABETES MELLITUS WITH HYPERGLYCEMIA, WITH LONG-TERM CURRENT USE OF INSULIN (H): ICD-10-CM

## 2021-06-26 DIAGNOSIS — E11.9 TYPE 2 DIABETES MELLITUS WITHOUT COMPLICATION, WITHOUT LONG-TERM CURRENT USE OF INSULIN (H): ICD-10-CM

## 2021-06-28 ENCOUNTER — VIRTUAL VISIT (OUTPATIENT)
Dept: ENDOCRINOLOGY | Facility: CLINIC | Age: 46
End: 2021-06-28
Payer: COMMERCIAL

## 2021-06-28 DIAGNOSIS — Z79.4 TYPE 2 DIABETES MELLITUS WITH HYPERGLYCEMIA, WITH LONG-TERM CURRENT USE OF INSULIN (H): Primary | ICD-10-CM

## 2021-06-28 DIAGNOSIS — I10 ESSENTIAL HYPERTENSION: ICD-10-CM

## 2021-06-28 DIAGNOSIS — K76.0 FATTY LIVER: ICD-10-CM

## 2021-06-28 DIAGNOSIS — E11.3299 NPDR (NONPROLIFERATIVE DIABETIC RETINOPATHY) (H): ICD-10-CM

## 2021-06-28 DIAGNOSIS — E11.65 TYPE 2 DIABETES MELLITUS WITH HYPERGLYCEMIA, WITH LONG-TERM CURRENT USE OF INSULIN (H): Primary | ICD-10-CM

## 2021-06-28 DIAGNOSIS — E78.2 MIXED HYPERLIPIDEMIA: ICD-10-CM

## 2021-06-28 DIAGNOSIS — E11.9 TYPE 2 DIABETES MELLITUS WITHOUT COMPLICATION, WITHOUT LONG-TERM CURRENT USE OF INSULIN (H): Chronic | ICD-10-CM

## 2021-06-28 PROCEDURE — 99214 OFFICE O/P EST MOD 30 MIN: CPT | Mod: 95 | Performed by: INTERNAL MEDICINE

## 2021-06-28 RX ORDER — DAPAGLIFLOZIN 10 MG/1
10 TABLET, FILM COATED ORAL DAILY
Qty: 90 TABLET | Refills: 3 | Status: SHIPPED | OUTPATIENT
Start: 2021-06-28 | End: 2022-04-12

## 2021-06-28 RX ORDER — FLASH GLUCOSE SENSOR
KIT MISCELLANEOUS
Qty: 2 EACH | Refills: 0 | Status: SHIPPED | OUTPATIENT
Start: 2021-06-28 | End: 2022-04-12

## 2021-06-28 RX ORDER — DAPAGLIFLOZIN 5 MG/1
TABLET, FILM COATED ORAL
Qty: 30 TABLET | Refills: 0 | OUTPATIENT
Start: 2021-06-28

## 2021-06-28 RX ORDER — DULAGLUTIDE 3 MG/.5ML
3 INJECTION, SOLUTION SUBCUTANEOUS
Qty: 6 ML | Refills: 3 | Status: SHIPPED | OUTPATIENT
Start: 2021-06-28 | End: 2022-04-12

## 2021-06-28 NOTE — PROGRESS NOTES
Norberto is a 45 year old who is being evaluated via a billable video visit.      How would you like to obtain your AVS? MyChart  If the video visit is dropped, the invitation should be resent by:   Will anyone else be joining your video visit? No      Video Start Time: 4:13 PM  S: Pt being seen in f/u for DM.    Farxiga 5 mg every day  Metformin 1000 mg every day  trulicity 1.5 mg once a week  Tresiba recently increased to 50 U every day     Using Herminia.           Visit interrupted several times by poor signal.   Eventually he had to pull over so we could talk.     ROS: 10 point ROS neg other than the symptoms noted above in the HPI.    O:  GENERAL: Healthy, alert and no distress  EYES: Eyes grossly normal to inspection.  No discharge or erythema, or obvious scleral/conjunctival abnormalities.  RESP: No audible wheeze, cough, or visible cyanosis.  No visible retractions or increased work of breathing.    SKIN: Visible skin clear. No significant rash, abnormal pigmentation or lesions.  NEURO: Cranial nerves grossly intact.  Mentation and speech appropriate for age.  PSYCH: Mentation appears normal, affect normal/bright, judgement and insight intact, normal speech and appearance well-groomed.     A/P:   Type 2 DM - Uncontrolled. Discussed SGLT-2 inhibitors. Given recent worsening in HbA1C, we should rule out ADDIS first.   Labs from 10/2020 consistent with type 2 DM and farxiga ordered.   In 6/2021, up to 9.0%. Describing diarrhea with metformin. However, he wants to stick with it.   -Scan the Herminia at least every 8 hours.   -Referral to dietician.   -Continue metformin.   -No change to tresiba dose.   -Increase farxiga from 5 to 10 mg every day.   -Increase trulicity from 1.5 to 3.0 mg once a week.   -Labs in 3 months.   -ASA taking.  -BP: normal on last check.   -NAFL/DEMPSEY: Elevated ALT in 7/2020.   US from 2019 shows fatty liver. Discussed significance with patient.    ALT 87 in 6/2021.   -Lipids: Trg 230, HDL 40, LDL  44. On atorvastatin.               Trg 250, HDL 48, LDL 54.   -Microalbumin 31.48 in 7/2020. On lisinopril.    Repeat with next labs.   -Eyes: mild NPDR in 6/2021. Discussed significance.  -Smoking: none.      Fatty liver - as above.     Dyslipidemia - as above.       Jose De Jesus Mars M.D        Video-Visit Details    Type of service:  Video Visit    Video End Time:4:40 PM    Originating Location (pt. Location): Other Car    Distant Location (provider location):  [facility]     Platform used for Video Visit: [employer]

## 2021-06-29 ENCOUNTER — TELEPHONE (OUTPATIENT)
Dept: ENDOCRINOLOGY | Facility: CLINIC | Age: 46
End: 2021-06-29

## 2021-06-29 NOTE — TELEPHONE ENCOUNTER
Diabetes Education Scheduling Outreach #1:    Call to patient to schedule. Left message with phone number to call to schedule.    Plan for 2nd outreach attempt within 1 week.    Thalia Platt  [facility]  Diabetes and Nutrition Scheduling

## 2021-07-09 DIAGNOSIS — N52.9 ERECTILE DYSFUNCTION, UNSPECIFIED ERECTILE DYSFUNCTION TYPE: ICD-10-CM

## 2021-07-10 RX ORDER — TADALAFIL 5 MG/1
TABLET ORAL
Qty: 30 TABLET | Refills: 0 | Status: SHIPPED | OUTPATIENT
Start: 2021-07-10 | End: 2021-08-07

## 2021-07-22 ENCOUNTER — OFFICE VISIT (OUTPATIENT)
Dept: URGENT CARE | Facility: URGENT CARE | Age: 46
End: 2021-07-22
Payer: COMMERCIAL

## 2021-07-22 VITALS
TEMPERATURE: 98.1 F | DIASTOLIC BLOOD PRESSURE: 101 MMHG | SYSTOLIC BLOOD PRESSURE: 151 MMHG | OXYGEN SATURATION: 97 % | HEART RATE: 76 BPM

## 2021-07-22 DIAGNOSIS — M25.511 ACUTE PAIN OF BOTH SHOULDERS: ICD-10-CM

## 2021-07-22 DIAGNOSIS — M25.512 ACUTE PAIN OF BOTH SHOULDERS: ICD-10-CM

## 2021-07-22 DIAGNOSIS — M54.2 NECK PAIN: ICD-10-CM

## 2021-07-22 DIAGNOSIS — M54.9 UPPER BACK PAIN: Primary | ICD-10-CM

## 2021-07-22 DIAGNOSIS — R03.0 ELEVATED BLOOD PRESSURE READING: ICD-10-CM

## 2021-07-22 PROCEDURE — 99214 OFFICE O/P EST MOD 30 MIN: CPT | Performed by: NURSE PRACTITIONER

## 2021-07-22 RX ORDER — NAPROXEN 500 MG/1
500 TABLET ORAL 2 TIMES DAILY WITH MEALS
Qty: 28 TABLET | Refills: 0 | Status: SHIPPED | OUTPATIENT
Start: 2021-07-22 | End: 2021-08-05

## 2021-07-22 RX ORDER — CYCLOBENZAPRINE HCL 10 MG
10 TABLET ORAL 3 TIMES DAILY PRN
Qty: 21 TABLET | Refills: 0 | Status: SHIPPED | OUTPATIENT
Start: 2021-07-22 | End: 2021-07-29

## 2021-07-22 ASSESSMENT — ENCOUNTER SYMPTOMS
BACK PAIN: 1
NECK PAIN: 1

## 2021-07-22 NOTE — PATIENT INSTRUCTIONS
Patient Education     Neck Pain     Neck pain has several possible causes when there is no injury:    You can get a minor ligament sprain or muscle strain from a sudden minor neck movement. Sleeping with your neck in an awkward position can also cause this.    Some people respond to emotional stress by tensing the muscles of their neck, shoulders, and upper back. Chronic spasm in these muscles can cause neck pain and sometimes headaches.    Gradual wear and tear of the joints in the spine can cause degenerative arthritis. This can be a source of occasional or chronic neck pain.    The spinal disks may bulge and put pressure on a nearby spinal nerve. This can happen as a natural result of aging or repeated small injuries to the neck. The spinal disks are the cushions between each spinal bone. This causes tingling, pain, or numbness that spreads from the neck to the shoulder, arm, or hand on one side.  Acute neck pain usually gets better in 1 to 2 weeks. Neck pain related to disk disease, arthritis in the spinal joints, or spinal stenosis can become chronic and last for months or years. Spinal stenosis is narrowing of the spinal canal.  X-rays are usually not ordered for the initial evaluation of neck pain. But X-rays may be done if you had a forceful physical injury, such as a car accident or fall. If pain continues and doesn t respond to medical treatment, X-rays and other tests may be done at a later time.  Home care    Rest and relax the muscles. Use a comfortable pillow that supports the head. It should also help keep the spine in a neutral position. The position of the head should not be tilted forward or backward. A rolled up towel may help for a custom fit.    A soft cervical collar can help pain, especially pain with head movement. Your doctor can tell you if this is appropriate for your condition.    Some people find relief with heat. Heat can be applied with either a warm shower or bath or a moist towel  heated in the microwave and massage. Others prefer cold packs. You can make an ice pack by filling a plastic bag that seals at the top with ice cubes or crushed ice and then wrapping it with a thin towel. Try both and use the method that feels best for 15 to 20 minutes, several times a day.    Whether using ice or heat, be careful that you don't injure your skin. Never put ice directly on the skin. Always wrap the ice in a towel or other type of cloth. This is very important, especially in people with poor skin sensations.     Try to reduce your stress level. Emotional stress can lead to neck muscle tension and get in the way of or delay the healing process.    You may use over-the-counter pain medicine to control pain, unless another medicine was prescribed. If you have chronic liver or kidney disease or ever had a stomach ulcer or digestive bleeding, talk with your healthcare provider before using these medicines.    Follow-up care  Follow up with your healthcare provider if your symptoms don't show signs of improvement after one week. Physical therapy or further tests may be needed.  If X-rays, CT scans, or MRI scans were taken, you will be told of any new findings that may affect your care.  Call 911  Call 911 if you have:    Sudden weakness or numbness in one or both arms    Neck swelling, difficulty or painful swallowing    Trouble breathing    Chest pain  When to seek medical advice  Call your healthcare provider right away if any of these occur:    Pain becomes worse or spreads into one or both arm    Increasing headache    Fever of 100.4 F (38 C) or higher, or as directed by your healthcare provider  Cassi last reviewed this educational content on 11/1/2019 2000-2021 [employer]. All rights reserved. This information is not intended as a substitute for professional medical care. Always follow your healthcare professional's instructions.

## 2021-07-22 NOTE — LETTER
[facility]  [address] [facility] [address]  Phone: [phone]    July 22, 2021        Norberto Doty  [address] [address]          To whom it may concern:    RE: Norberto Doty    Patient was seen and treated today at our clinic. Please allow him to rest home 7/23/2021. And patient may return to work  with no restrictions.    Please contact me for questions or concerns.      Sincerely,      Humera Garber  DNP, FNP-BC  Family Nurse Practitoner

## 2021-07-22 NOTE — PROGRESS NOTES
SUBJECTIVE:   Norberto Doty is a 45 year old male presenting with a chief complaint of   Chief Complaint   Patient presents with     Back Pain     Pain between shoulders feels like moved up to his neck. Kathya pain in the back of the skull. Started tuesday night, thought just over did it for the day.       He is an established patient of [facility].    Back Pain    Onset of symptoms was 2 day(s) ago.  Location: bilateral upper back  Radiation: both shoulder blades and neck  Context: assisted with moving and lifting stuff for garage sale and strained upper back and neck.       Patient experienced immediate pain  Course of symptoms is worsening.    Severity moderate  Current and Associated symptoms: pain  Denies: fecal incontinence, urinary incontinence, lower extremity numbness, lower extremity weakness and paresthesia    Aggravating Factors: bending and changing position  Therapies to improve symptoms include: ice and Tylenol  Past history: no prior back problems    Review of Systems   Musculoskeletal: Positive for back pain and neck pain.        Both shoulders   All other systems reviewed and are negative.      Past Medical History:   Diagnosis Date     Acute calculous cholecystitis 1/16/2019     Diabetes (H)      Hematuria 1/16/2019     Sepsis (H) 1/15/2019     Family History   Problem Relation Age of Onset     Diabetes Maternal Aunt      Cancer No family hx of      Hypertension No family hx of      Cerebrovascular Disease No family hx of      Thyroid Disease No family hx of      Glaucoma No family hx of      Macular Degeneration No family hx of      Current Outpatient Medications   Medication Sig Dispense Refill     atorvastatin (LIPITOR) 40 MG tablet Take 1 tablet by mouth once daily 90 tablet 3     Carboxymeth-Glycerin-Polysorb (REFRESH OPTIVE ADVANCED) 0.5-1-0.5 % SOLN Apply 1 drop to eye 2 times daily       cyclobenzaprine (FLEXERIL) 10 MG tablet Take 1 tablet (10 mg) by mouth 3 times daily as needed for  muscle spasms 21 tablet 0     dapagliflozin (FARXIGA) 10 MG TABS tablet Take 1 tablet (10 mg) by mouth daily 90 tablet 3     Dulaglutide (TRULICITY) 3 MG/0.5ML SOPN Inject 3 mg Subcutaneous every 7 days 6 mL 3     insulin degludec (TRESIBA FLEXTOUCH) 100 UNIT/ML pen Inject 44 Units Subcutaneous At Bedtime 45 mL 0     lisinopril (ZESTRIL) 2.5 MG tablet Take 1 tablet by mouth once daily 90 tablet 1     metFORMIN (GLUCOPHAGE-XR) 500 MG 24 hr tablet Take 2 tablets (1,000 mg) by mouth daily (with breakfast) 360 tablet 1     naproxen (NAPROSYN) 500 MG tablet Take 1 tablet (500 mg) by mouth 2 times daily (with meals) for 14 days 28 tablet 0     omeprazole (PRILOSEC) 40 MG DR capsule Take 1 capsule by mouth daily       tadalafil (CIALIS) 5 MG tablet TAKE 1 TABLET BY MOUTH EVERY 24 HOURS 30 tablet 0     TRULICITY 1.5 MG/0.5ML pen INJECT 1.5 MG SUBCUTANEOUSLY EVERY 7 DAYS 24 mL 0     ACCU-CHEK GUIDE test strip USE  THREE TIMES DAILY OR  AS  DIRECTED 300 strip 1     aspirin  MG tablet Take 1 tablet (325 mg) by mouth daily (Patient not taking: Reported on 7/22/2021) 90 tablet 3     blood glucose monitoring (ACCU-CHEK FASTCLIX) lancets Use to test blood sugar 3 times daily or as directed.  Ok to substitute alternative if insurance prefers. 102 each 11     Continuous Blood Gluc  (FREESTYLE CELINA 14 DAY READER) WEST Use to read blood sugars as per 's instructions. 1 each 0     Continuous Blood Gluc Sensor (FREESTYLE CELINA 14 DAY SENSOR) MISC CHANGE EVERY 14 DAYS 2 each 0     FARXIGA 5 MG TABS tablet Take 1 tablet by mouth once daily (Patient not taking: Reported on 7/22/2021) 30 tablet 0     ibuprofen (ADVIL/MOTRIN) 600 MG tablet TAKE 1 TABLET BY MOUTH THREE TIMES DAILY WITH MEALS FOR 10 DAYS . MAXIMUM OF 3200 MG IN 24 HOURS. (Patient not taking: Reported on 7/22/2021)  0     insulin pen needle (BD RAMILA U/F) 32G X 4 MM miscellaneous Use 1 daily as directed. 100 each 3     insulin pen needle (BD RAMILA U/F)  32G X 4 MM Use 1 daily as directed. 100 each 1     sildenafil (REVATIO) 20 MG tablet May take 1-5 pills one hour before intercourse (Patient not taking: Reported on 7/22/2021) 50 tablet 3     Social History     Tobacco Use     Smoking status: Never Smoker     Smokeless tobacco: Never Used   Substance Use Topics     Alcohol use: Yes     Alcohol/week: 0.0 standard drinks     Comment: OCC       OBJECTIVE  BP (!) 151/101   Pulse 76   Temp 98.1  F (36.7  C) (Tympanic)   SpO2 97%     Physical Exam  Vitals and nursing note reviewed.   Constitutional:       General: He is not in acute distress.     Appearance: He is well-developed. He is not diaphoretic.   HENT:      Head: Normocephalic and atraumatic.      Right Ear: Tympanic membrane and external ear normal.      Left Ear: Tympanic membrane and external ear normal.   Eyes:      Pupils: Pupils are equal, round, and reactive to light.   Pulmonary:      Effort: Pulmonary effort is normal. No respiratory distress.      Breath sounds: Normal breath sounds.   Musculoskeletal:      Cervical back: Normal range of motion and neck supple.      Comments: No tenderness palpation of upper back.  No bruising noted.  Range of motion is intact.  No tenderness on the shoulder blades with knees sitting still but tenderness when he hyperextends his arms.  Cervical spine exam:   No focal tenderness is noted along spinous processes.    Range of Motion: forward flexion full , extension full , lateral rotation full , lateral flexion full.  Pain is increased with left lateral rotation and flexion     Lymphadenopathy:      Cervical: No cervical adenopathy.   Skin:     General: Skin is warm and dry.   Neurological:      Mental Status: He is alert.      Cranial Nerves: No cranial nerve deficit.         ASSESSMENT:      ICD-10-CM    1. Upper back pain  M54.9 cyclobenzaprine (FLEXERIL) 10 MG tablet     naproxen (NAPROSYN) 500 MG tablet   2. Neck pain  M54.2 cyclobenzaprine (FLEXERIL) 10 MG tablet      naproxen (NAPROSYN) 500 MG tablet   3. Acute pain of both shoulders  M25.511 cyclobenzaprine (FLEXERIL) 10 MG tablet    M25.512 naproxen (NAPROSYN) 500 MG tablet   4. Elevated blood pressure reading  R03.0           PLAN:  Rest painful area  ICE  Pain medication as advised  Side effects of medication discussed  As pain subsides, stretching is advised  Consider physical therapy if pain is persisting after symptomatic treatment  All questions answered and patient is in agreement with treatment paln      Patient Instructions     Patient Education     Neck Pain     Neck pain has several possible causes when there is no injury:    You can get a minor ligament sprain or muscle strain from a sudden minor neck movement. Sleeping with your neck in an awkward position can also cause this.    Some people respond to emotional stress by tensing the muscles of their neck, shoulders, and upper back. Chronic spasm in these muscles can cause neck pain and sometimes headaches.    Gradual wear and tear of the joints in the spine can cause degenerative arthritis. This can be a source of occasional or chronic neck pain.    The spinal disks may bulge and put pressure on a nearby spinal nerve. This can happen as a natural result of aging or repeated small injuries to the neck. The spinal disks are the cushions between each spinal bone. This causes tingling, pain, or numbness that spreads from the neck to the shoulder, arm, or hand on one side.  Acute neck pain usually gets better in 1 to 2 weeks. Neck pain related to disk disease, arthritis in the spinal joints, or spinal stenosis can become chronic and last for months or years. Spinal stenosis is narrowing of the spinal canal.  X-rays are usually not ordered for the initial evaluation of neck pain. But X-rays may be done if you had a forceful physical injury, such as a car accident or fall. If pain continues and doesn t respond to medical treatment, X-rays and other tests may be done  at a later time.  Home care    Rest and relax the muscles. Use a comfortable pillow that supports the head. It should also help keep the spine in a neutral position. The position of the head should not be tilted forward or backward. A rolled up towel may help for a custom fit.    A soft cervical collar can help pain, especially pain with head movement. Your doctor can tell you if this is appropriate for your condition.    Some people find relief with heat. Heat can be applied with either a warm shower or bath or a moist towel heated in the microwave and massage. Others prefer cold packs. You can make an ice pack by filling a plastic bag that seals at the top with ice cubes or crushed ice and then wrapping it with a thin towel. Try both and use the method that feels best for 15 to 20 minutes, several times a day.    Whether using ice or heat, be careful that you don't injure your skin. Never put ice directly on the skin. Always wrap the ice in a towel or other type of cloth. This is very important, especially in people with poor skin sensations.     Try to reduce your stress level. Emotional stress can lead to neck muscle tension and get in the way of or delay the healing process.    You may use over-the-counter pain medicine to control pain, unless another medicine was prescribed. If you have chronic liver or kidney disease or ever had a stomach ulcer or digestive bleeding, talk with your healthcare provider before using these medicines.    Follow-up care  Follow up with your healthcare provider if your symptoms don't show signs of improvement after one week. Physical therapy or further tests may be needed.  If X-rays, CT scans, or MRI scans were taken, you will be told of any new findings that may affect your care.  Call 911  Call 911 if you have:    Sudden weakness or numbness in one or both arms    Neck swelling, difficulty or painful swallowing    Trouble breathing    Chest pain  When to seek medical  advice  Call your healthcare provider right away if any of these occur:    Pain becomes worse or spreads into one or both arm    Increasing headache    Fever of 100.4 F (38 C) or higher, or as directed by your healthcare provider  Cassi last reviewed this educational content on 11/1/2019 2000-2021 [employer]. All rights reserved. This information is not intended as a substitute for professional medical care. Always follow your healthcare professional's instructions.

## 2021-07-29 ENCOUNTER — VIRTUAL VISIT (OUTPATIENT)
Dept: EDUCATION SERVICES | Facility: OTHER | Age: 46
End: 2021-07-29
Attending: INTERNAL MEDICINE
Payer: COMMERCIAL

## 2021-07-29 ENCOUNTER — TELEPHONE (OUTPATIENT)
Dept: EDUCATION SERVICES | Facility: OTHER | Age: 46
End: 2021-07-29

## 2021-07-29 DIAGNOSIS — E11.65 TYPE 2 DIABETES MELLITUS WITH HYPERGLYCEMIA, WITH LONG-TERM CURRENT USE OF INSULIN (H): Primary | ICD-10-CM

## 2021-07-29 DIAGNOSIS — Z79.4 TYPE 2 DIABETES MELLITUS WITH HYPERGLYCEMIA, WITH LONG-TERM CURRENT USE OF INSULIN (H): ICD-10-CM

## 2021-07-29 DIAGNOSIS — E11.65 TYPE 2 DIABETES MELLITUS WITH HYPERGLYCEMIA, WITH LONG-TERM CURRENT USE OF INSULIN (H): ICD-10-CM

## 2021-07-29 DIAGNOSIS — Z79.4 TYPE 2 DIABETES MELLITUS WITH HYPERGLYCEMIA, WITH LONG-TERM CURRENT USE OF INSULIN (H): Primary | ICD-10-CM

## 2021-07-29 PROCEDURE — G0108 DIAB MANAGE TRN  PER INDIV: HCPCS | Mod: 95 | Performed by: DIETITIAN, REGISTERED

## 2021-07-29 NOTE — LETTER
7/29/2021         RE: Norberto Doty  [address] [id number]        Dear Colleague,    Thank you for referring your patient, Norberto Doty, to the [facility]. Please see a copy of my visit note below.    Diabetes Self-Management Education & Support    Type of service:  Video Visit    If the video visit is dropped, the video visit invitation should be resent by: cell phone    Originating Location (pt. Location): Home  Distant Location (provider location): Home  Mode of Communication:  Video Conference via [employer]    Video Start Time: 2 pm  Video End Time (time video stopped): 3:10 pm    How would patient like to obtain AVS? MyChart    Presents for: Individual review    SUBJECTIVE/OBJECTIVE:  Presents for: Individual review  Accompanied by: Self  Diabetes education in the past 24mo: Yes  Focus of Visit: Taking Medication, Monitoring  Diabetes type: Type 2  Disease course: Improving  How confident are you filling out medical forms by yourself:: Not Assessed  Diabetes management related comments/concerns: My numbers are getting a lot better  Transportation concerns: No  Difficulty affording diabetes medication?: No  Difficulty affording diabetes testing supplies?: No  Other concerns:: None  Cultural Influences/Ethnic Background:  Choose not to answer    Diabetes Symptoms & Complications:  Visual change: Yes (says just had contacts redone)  Weight trend: Stable  Complications assessed today?: No    Patient Problem List and Family Medical History reviewed for relevant medical history, current medical status, and diabetes risk factors.    Vitals:  There were no vitals taken for this visit.  Estimated body mass index is 43.81 kg/m  as calculated from the following:    Height as of 6/2/21: 1.829 m (6').    Weight as of 6/2/21: 146.5 kg (323 lb).   Last 3 BP:   BP Readings from Last 3 Encounters:   07/22/21 (!) 151/101   06/02/21 130/75   09/10/20 128/84  "      History   Smoking Status     Never Smoker   Smokeless Tobacco     Never Used       Labs:  Lab Results   Component Value Date    A1C 9.0 06/02/2021     Lab Results   Component Value Date     06/02/2021     Lab Results   Component Value Date    LDL 54 06/02/2021     HDL Cholesterol   Date Value Ref Range Status   06/02/2021 48 >39 mg/dL Final   ]  GFR Estimate   Date Value Ref Range Status   06/02/2021 >90 >60 mL/min/[1.73_m2] Final     Comment:     Non  GFR Calc  Starting 12/18/2018, serum creatinine based estimated GFR (eGFR) will be   calculated using the Chronic Kidney Disease Epidemiology Collaboration   (CKD-EPI) equation.       GFR Estimate If Black   Date Value Ref Range Status   06/02/2021 >90 >60 mL/min/[1.73_m2] Final     Comment:      GFR Calc  Starting 12/18/2018, serum creatinine based estimated GFR (eGFR) will be   calculated using the Chronic Kidney Disease Epidemiology Collaboration   (CKD-EPI) equation.       Lab Results   Component Value Date    CR 0.87 06/02/2021     No results found for: MICROALBUMIN    Healthy Eating:  Healthy Eating Assessed Today: Yes  Breakfast: 5832-8506 [employer] ice coffee (vanilla ice coffee) mcdonalds sausage egg and cheese - usually takes top bun off  Lunch: in the field may not eat because metformin affects him, \"unfortunately has been making more fast food choice\", likes the sparkling ICE brand water  Dinner: chicken, pork & beef, starch, veggies  Beverages: Coffee drinks, Diet soda (not sugar free coffee drink)  Has patient met with a dietitian in the past?: Yes    Being Active:  Being Active Assessed Today: Yes  Exercise:: Currently not exercising (moving around a lot at job sites)  Barrier to exercise: Time    Monitoring:  Monitoring Assessed Today: Yes  Did patient bring glucose meter to appointment? : Yes (CGM)  Blood glucose trend: Decreasing                Taking Medications:  Diabetes Medication(s)     Biguanides   "     metFORMIN (GLUCOPHAGE-XR) 500 MG 24 hr tablet    Take 2 tablets (1,000 mg) by mouth daily (with breakfast)    Insulin       insulin degludec (TRESIBA FLEXTOUCH) 100 UNIT/ML pen    Inject 44 Units Subcutaneous At Bedtime    Sodium-Glucose Co-Transporter 2 (SGLT2) Inhibitors       dapagliflozin (FARXIGA) 10 MG TABS tablet    Take 1 tablet (10 mg) by mouth daily     FARXIGA 5 MG TABS tablet    Take 1 tablet by mouth once daily     Patient not taking: Reported on 7/22/2021    Incretin Mimetic Agents (GLP-1 Receptor Agonists)       Dulaglutide (TRULICITY) 3 MG/0.5ML SOPN    Inject 3 mg Subcutaneous every 7 days     TRULICITY 1.5 MG/0.5ML pen    INJECT 1.5 MG SUBCUTANEOUSLY EVERY 7 DAYS          Taking Medication Assessed Today: Yes  Current Treatments: Oral Medication (taken by mouth), Insulin Injections, Non-insulin Injectables  Dose schedule: Pre-breakfast  Given by: Patient  Problems taking diabetes medications regularly?: Yes  Diabetes medication side effects?: Yes    Problem Solving:  Problem Solving Assessed Today: Yes  Is the patient at risk for hypoglycemia?: Yes  Hypoglycemia Frequency: Never  Medical ID: No  Does patient have glucagon emergency kit?: No  Is the patient at risk for DKA?: No  Does patient have severe weather/disaster plan for diabetes management?: No  Does patient have sick day plan for diabetes management?: No              Reducing Risks:  Reducing Risks Assessed Today: No  Diabetes Risks: Sedentary Lifestyle, Family History  CAD Risks: Diabetes Mellitus, Family history, Male sex, Obesity, Sedentary lifestyle  Has dilated eye exam at least once a year?: Yes    Healthy Coping:  Healthy Coping Assessed Today: Yes  Emotional response to diabetes: Concern for health and well-being, Fear/Anxiety, Shock/Denial/Bargaining  Informal Support system:: Spouse  Stage of change: ACTION (Actively working towards change)  Support resources: None  Patient Activation Measure Survey Score:  MELANI Score (Last  Two) 4/21/2016 7/8/2020   MELANI Raw Score 52 40   Activation Score 100 100   MELANI Level 4 4       Diabetes knowledge and skills assessment:   Patient is knowledgeable in diabetes management concepts related to: Monitoring and Taking Medication    Patient needs further education on the following diabetes management concepts: Healthy Eating and Monitoring    Based on learning assessment above, most appropriate setting for further diabetes education would be: Individual setting.      INTERVENTIONS:    Education provided today on:  AADE Self-Care Behaviors:  Healthy Eating: carbohydrate counting, weight reduction and portion control  Monitoring: frequency of monitoring    Opportunities for ongoing education and support in diabetes-self management were discussed.    Pt verbalized understanding of concepts discussed and recommendations provided today.       Education Materials Provided:  AVS      ASSESSMENT:  Pt is interested in dexcom vs [facility].  Pt reports he bumps his arm a lot with herminia.  Several days of report pt's herminia reporting very low events.  Pt states he verified with meter and blood sugar running close to 135 mg/dl.  Believes sensor was faulty and has call out to herminia for replacement.  Going forward, he would like to consider dexcom.    Based on data and pt reporting lows/very lows not accurate, will not adjust insulin today as majority of time in target.  Discussed titration of insulin if needed.      Pt wanted to talk about diet - frustrated he can't lose wt.  Discussed diet in detail and eating out for breakfast daily and many times for lunch.  Reviewed alternative foods he can choose.    Plan to follow up in clinic for dexcom trial pack.        Patient's most recent   Lab Results   Component Value Date    A1C 9.0 06/02/2021    is not meeting goal of <7.0    PLAN  See Patient Instructions for co-developed, patient-stated behavior change goals.  AVS printed and provided to patient today. See Follow-Up section  for recommended follow-up.    Time Spent: 70 minutes  Encounter Type: Individual    Any diabetes medication dose changes were made via the CDE Protocol and Collaborative Practice Agreement with the patient's referring provider. A copy of this encounter was shared with the provider.      NICO Mcdonnell [facility]  [phone]

## 2021-07-29 NOTE — PROGRESS NOTES
Diabetes Self-Management Education & Support    Type of service:  Video Visit    If the video visit is dropped, the video visit invitation should be resent by: cell phone    Originating Location (pt. Location): Home  Distant Location (provider location): Home  Mode of Communication:  Video Conference via [employer]    Video Start Time: 2 pm  Video End Time (time video stopped): 3:10 pm    How would patient like to obtain AVS? Emil    Presents for: Individual review    SUBJECTIVE/OBJECTIVE:  Presents for: Individual review  Accompanied by: Self  Diabetes education in the past 24mo: Yes  Focus of Visit: Taking Medication, Monitoring  Diabetes type: Type 2  Disease course: Improving  How confident are you filling out medical forms by yourself:: Not Assessed  Diabetes management related comments/concerns: My numbers are getting a lot better  Transportation concerns: No  Difficulty affording diabetes medication?: No  Difficulty affording diabetes testing supplies?: No  Other concerns:: None  Cultural Influences/Ethnic Background:  Choose not to answer    Diabetes Symptoms & Complications:  Visual change: Yes (says just had contacts redone)  Weight trend: Stable  Complications assessed today?: No    Patient Problem List and Family Medical History reviewed for relevant medical history, current medical status, and diabetes risk factors.    Vitals:  There were no vitals taken for this visit.  Estimated body mass index is 43.81 kg/m  as calculated from the following:    Height as of 6/2/21: 1.829 m (6').    Weight as of 6/2/21: 146.5 kg (323 lb).   Last 3 BP:   BP Readings from Last 3 Encounters:   07/22/21 (!) 151/101   06/02/21 130/75   09/10/20 128/84       History   Smoking Status     Never Smoker   Smokeless Tobacco     Never Used       Labs:  Lab Results   Component Value Date    A1C 9.0 06/02/2021     Lab Results   Component Value Date     06/02/2021     Lab Results   Component Value Date    LDL 54  "06/02/2021     HDL Cholesterol   Date Value Ref Range Status   06/02/2021 48 >39 mg/dL Final   ]  GFR Estimate   Date Value Ref Range Status   06/02/2021 >90 >60 mL/min/[1.73_m2] Final     Comment:     Non  GFR Calc  Starting 12/18/2018, serum creatinine based estimated GFR (eGFR) will be   calculated using the Chronic Kidney Disease Epidemiology Collaboration   (CKD-EPI) equation.       GFR Estimate If Black   Date Value Ref Range Status   06/02/2021 >90 >60 mL/min/[1.73_m2] Final     Comment:      GFR Calc  Starting 12/18/2018, serum creatinine based estimated GFR (eGFR) will be   calculated using the Chronic Kidney Disease Epidemiology Collaboration   (CKD-EPI) equation.       Lab Results   Component Value Date    CR 0.87 06/02/2021     No results found for: MICROALBUMIN    Healthy Eating:  Healthy Eating Assessed Today: Yes  Breakfast: 4711-2578 [employer] ice coffee (vanilla ice coffee) mcdonalds sausage egg and cheese - usually takes top bun off  Lunch: in the field may not eat because metformin affects him, \"unfortunately has been making more fast food choice\", likes the sparkling ICE brand water  Dinner: chicken, pork & beef, starch, veggies  Beverages: Coffee drinks, Diet soda (not sugar free coffee drink)  Has patient met with a dietitian in the past?: Yes    Being Active:  Being Active Assessed Today: Yes  Exercise:: Currently not exercising (moving around a lot at job sites)  Barrier to exercise: Time    Monitoring:  Monitoring Assessed Today: Yes  Did patient bring glucose meter to appointment? : Yes (CGM)  Blood glucose trend: Decreasing                Taking Medications:  Diabetes Medication(s)     Biguanides       metFORMIN (GLUCOPHAGE-XR) 500 MG 24 hr tablet    Take 2 tablets (1,000 mg) by mouth daily (with breakfast)    Insulin       insulin degludec (TRESIBA FLEXTOUCH) 100 UNIT/ML pen    Inject 44 Units Subcutaneous At Bedtime    Sodium-Glucose Co-Transporter 2 " (SGLT2) Inhibitors       dapagliflozin (FARXIGA) 10 MG TABS tablet    Take 1 tablet (10 mg) by mouth daily     FARXIGA 5 MG TABS tablet    Take 1 tablet by mouth once daily     Patient not taking: Reported on 7/22/2021    Incretin Mimetic Agents (GLP-1 Receptor Agonists)       Dulaglutide (TRULICITY) 3 MG/0.5ML SOPN    Inject 3 mg Subcutaneous every 7 days     TRULICITY 1.5 MG/0.5ML pen    INJECT 1.5 MG SUBCUTANEOUSLY EVERY 7 DAYS          Taking Medication Assessed Today: Yes  Current Treatments: Oral Medication (taken by mouth), Insulin Injections, Non-insulin Injectables  Dose schedule: Pre-breakfast  Given by: Patient  Problems taking diabetes medications regularly?: Yes  Diabetes medication side effects?: Yes    Problem Solving:  Problem Solving Assessed Today: Yes  Is the patient at risk for hypoglycemia?: Yes  Hypoglycemia Frequency: Never  Medical ID: No  Does patient have glucagon emergency kit?: No  Is the patient at risk for DKA?: No  Does patient have severe weather/disaster plan for diabetes management?: No  Does patient have sick day plan for diabetes management?: No              Reducing Risks:  Reducing Risks Assessed Today: No  Diabetes Risks: Sedentary Lifestyle, Family History  CAD Risks: Diabetes Mellitus, Family history, Male sex, Obesity, Sedentary lifestyle  Has dilated eye exam at least once a year?: Yes    Healthy Coping:  Healthy Coping Assessed Today: Yes  Emotional response to diabetes: Concern for health and well-being, Fear/Anxiety, Shock/Denial/Bargaining  Informal Support system:: Spouse  Stage of change: ACTION (Actively working towards change)  Support resources: None  Patient Activation Measure Survey Score:  MELANI Score (Last Two) 4/21/2016 7/8/2020   MELANI Raw Score 52 40   Activation Score 100 100   MELANI Level 4 4       Diabetes knowledge and skills assessment:   Patient is knowledgeable in diabetes management concepts related to: Monitoring and Taking Medication    Patient needs  further education on the following diabetes management concepts: Healthy Eating and Monitoring    Based on learning assessment above, most appropriate setting for further diabetes education would be: Individual setting.      INTERVENTIONS:    Education provided today on:  AADE Self-Care Behaviors:  Healthy Eating: carbohydrate counting, weight reduction and portion control  Monitoring: frequency of monitoring    Opportunities for ongoing education and support in diabetes-self management were discussed.    Pt verbalized understanding of concepts discussed and recommendations provided today.       Education Materials Provided:  AVS      ASSESSMENT:  Pt is interested in dexcom vs Herminia.  Pt reports he bumps his arm a lot with herminia.  Several days of report pt's herminia reporting very low events.  Pt states he verified with meter and blood sugar running close to 135 mg/dl.  Believes sensor was faulty and has call out to herminia for replacement.  Going forward, he would like to consider dexcom.    Based on data and pt reporting lows/very lows not accurate, will not adjust insulin today as majority of time in target.  Discussed titration of insulin if needed.      Pt wanted to talk about diet - frustrated he can't lose wt.  Discussed diet in detail and eating out for breakfast daily and many times for lunch.  Reviewed alternative foods he can choose.    Plan to follow up in clinic for dexcom trial pack.        Patient's most recent   Lab Results   Component Value Date    A1C 9.0 06/02/2021    is not meeting goal of <7.0    PLAN  See Patient Instructions for co-developed, patient-stated behavior change goals.  AVS printed and provided to patient today. See Follow-Up section for recommended follow-up.    Time Spent: 70 minutes  Encounter Type: Individual    Any diabetes medication dose changes were made via the CDE Protocol and Collaborative Practice Agreement with the patient's referring provider. A copy of this encounter was  shared with the provider.      Dominga Kay RD [facility]  [phone]

## 2021-07-30 RX ORDER — PROCHLORPERAZINE 25 MG/1
SUPPOSITORY RECTAL
Qty: 1 EACH | Refills: 3
Start: 2021-07-30 | End: 2022-04-12

## 2021-07-30 RX ORDER — PROCHLORPERAZINE 25 MG/1
SUPPOSITORY RECTAL
Qty: 3 EACH | Refills: 11
Start: 2021-07-30 | End: 2022-09-22

## 2021-07-30 NOTE — TELEPHONE ENCOUNTER
Dr Mars,    Pt is interested in dexcom.  Says he would like to try it due to different placement options and no need to scan.  Pt will use his phone.    I've pended orders here.  Please sign if in agreement.    Thanks!    Dominga aKy RD [facility] [place]  [phone]

## 2021-07-30 NOTE — PATIENT INSTRUCTIONS
1).  For wt loss:  Aim for 45-60 grams of carb per meal; 0-20 grams per snack.  No less than 30 grams at a meal    We discussed some small changes you can make throughout your day - these with both cut some carbs and calories.    Insulin can make it more difficult to lose wt, but with even a small wt loss (5-10%) this can reduce your medication needs - making it easier to lose wt in the long run    2).  We talked about maybe changing your metformin extended release around to see if that helps with diarrhea (try 1 in the morning and 1 with supper - or try both at supper)    Something like sugar free metamucil or straight psyllium fiber can help your digestive health.  I would start with the starting dose and follow directions on package for adjusting.    3).  If you start to see low blood sugars/concern for hypoglycemia, I would start to lower your insulin.  You can look at your fasting blood sugars to determine this.  If consistently 100 or less, you might want to try a slight reduction 5-10 units    I pended the order for the dexcom for your Dr to review and sign.    Thanks!

## 2021-08-06 DIAGNOSIS — E11.9 TYPE 2 DIABETES MELLITUS WITHOUT COMPLICATION, WITHOUT LONG-TERM CURRENT USE OF INSULIN (H): Chronic | ICD-10-CM

## 2021-08-06 DIAGNOSIS — N52.9 ERECTILE DYSFUNCTION, UNSPECIFIED ERECTILE DYSFUNCTION TYPE: ICD-10-CM

## 2021-08-07 RX ORDER — TADALAFIL 5 MG/1
TABLET ORAL
Qty: 30 TABLET | Refills: 0 | Status: SHIPPED | OUTPATIENT
Start: 2021-08-07 | End: 2021-10-03

## 2021-08-07 RX ORDER — METFORMIN HCL 500 MG
TABLET, EXTENDED RELEASE 24 HR ORAL
Qty: 360 TABLET | Refills: 0 | Status: SHIPPED | OUTPATIENT
Start: 2021-08-07 | End: 2022-04-12

## 2021-08-09 RX ORDER — INSULIN DEGLUDEC 100 U/ML
50 INJECTION, SOLUTION SUBCUTANEOUS AT BEDTIME
Qty: 45 ML | Refills: 1 | Status: SHIPPED | OUTPATIENT
Start: 2021-08-09 | End: 2021-11-15

## 2021-08-09 NOTE — TELEPHONE ENCOUNTER
Prescription approved per [facility] Refill Protocol.    Ronald MENDOSA RN....8/9/2021 12:41 PM

## 2021-08-20 ENCOUNTER — ALLIED HEALTH/NURSE VISIT (OUTPATIENT)
Dept: EDUCATION SERVICES | Facility: CLINIC | Age: 46
End: 2021-08-20
Payer: COMMERCIAL

## 2021-08-20 DIAGNOSIS — E11.9 DIABETES MELLITUS WITHOUT COMPLICATION (H): ICD-10-CM

## 2021-08-20 DIAGNOSIS — E11.65 TYPE 2 DIABETES MELLITUS WITH HYPERGLYCEMIA, WITHOUT LONG-TERM CURRENT USE OF INSULIN (H): Primary | ICD-10-CM

## 2021-08-20 PROCEDURE — 95249 CONT GLUC MNTR PT PROV EQP: CPT

## 2021-08-20 PROCEDURE — 99207 PR DROP WITH A PROCEDURE: CPT

## 2021-08-20 PROCEDURE — G0108 DIAB MANAGE TRN  PER INDIV: HCPCS

## 2021-08-20 RX ORDER — PROCHLORPERAZINE 25 MG/1
1 SUPPOSITORY RECTAL CONTINUOUS
Qty: 9 EACH | Refills: 11 | Status: SHIPPED | OUTPATIENT
Start: 2021-08-20 | End: 2022-09-21

## 2021-08-20 RX ORDER — PROCHLORPERAZINE 25 MG/1
1 SUPPOSITORY RECTAL CONTINUOUS
Qty: 1 EACH | Refills: 3 | Status: SHIPPED | OUTPATIENT
Start: 2021-08-20 | End: 2022-09-22

## 2021-08-20 NOTE — LETTER
8/20/2021         RE: Norberto Doty  [address] [facility] [place] [facility] [place] [address]        Dear Colleague,    Thank you for referring your patient, [facility]to [facility] Please see a copy of my visit note below.    Norberto is here today to trial the Dexcom kit to see if this is something he would like to move forward on he did not care for the nitesh sensor it kept falling off he is up and running with his phone milton he is connected to our Dexcom clarity and I did order his Dexcom sensors and transmitters through [facility] he is to follow-up with Hilaria Kay which appears to be from weight loss any other concerns he is to send us a message    Mckenna Suero RN/WALDO  [facility]

## 2021-08-20 NOTE — PROGRESS NOTES
Norberto is here today to trial the Dexcom kit to see if this is something he would like to move forward on he did not care for the nitesh sensor it kept falling off he is up and running with his phone milton he is connected to our Dexcom clarity and I did order his Dexcom sensors and transmitters through [facility] he is to follow-up with Hilaria Kay which appears to be from weight loss any other concerns he is to send us a message    Mckenna Suero RN/WALDO  [facility]

## 2021-09-11 ENCOUNTER — HEALTH MAINTENANCE LETTER (OUTPATIENT)
Age: 46
End: 2021-09-11

## 2021-10-02 DIAGNOSIS — N52.9 ERECTILE DYSFUNCTION, UNSPECIFIED ERECTILE DYSFUNCTION TYPE: ICD-10-CM

## 2021-10-03 RX ORDER — TADALAFIL 5 MG/1
TABLET ORAL
Qty: 30 TABLET | Refills: 0 | Status: SHIPPED | OUTPATIENT
Start: 2021-10-03 | End: 2021-11-04

## 2021-11-04 DIAGNOSIS — N52.9 ERECTILE DYSFUNCTION, UNSPECIFIED ERECTILE DYSFUNCTION TYPE: ICD-10-CM

## 2021-11-04 RX ORDER — TADALAFIL 5 MG/1
TABLET ORAL
Qty: 30 TABLET | Refills: 1 | Status: SHIPPED | OUTPATIENT
Start: 2021-11-04 | End: 2022-01-30

## 2021-11-12 DIAGNOSIS — E11.9 TYPE 2 DIABETES MELLITUS WITHOUT COMPLICATION, WITHOUT LONG-TERM CURRENT USE OF INSULIN (H): Chronic | ICD-10-CM

## 2021-11-12 RX ORDER — LISINOPRIL 2.5 MG/1
TABLET ORAL
Qty: 90 TABLET | Refills: 0 | Status: SHIPPED | OUTPATIENT
Start: 2021-11-12 | End: 2022-04-12

## 2021-11-15 RX ORDER — INSULIN DEGLUDEC 100 U/ML
INJECTION, SOLUTION SUBCUTANEOUS
Qty: 45 ML | Refills: 0 | Status: SHIPPED | OUTPATIENT
Start: 2021-11-15 | End: 2022-04-12

## 2021-11-15 NOTE — TELEPHONE ENCOUNTER
Pending Prescriptions:                       Disp   Refills    TRESIBA FLEXTOUCH 100 UNIT/ML pen [Pharma*45 mL  0            Sig: INJECT 50 UNITS SUBCUTANEOUSLY AT BEDTIME    Routing refill request to provider for review/approval because:  Labs out of range:  It was stated on the refill protocol that patient did not  Have A1C, but they did from another provider. It came back at 9.0 on 6/2/21. Forwarding to provider due to abnormal lab result and medication refill.    Kaylan MERCHANT RN, [facility] 11/15/21 10:31 AM

## 2022-01-01 ENCOUNTER — HEALTH MAINTENANCE LETTER (OUTPATIENT)
Age: 47
End: 2022-01-01

## 2022-01-29 DIAGNOSIS — N52.9 ERECTILE DYSFUNCTION, UNSPECIFIED ERECTILE DYSFUNCTION TYPE: ICD-10-CM

## 2022-01-30 RX ORDER — TADALAFIL 5 MG/1
TABLET ORAL
Qty: 30 TABLET | Refills: 0 | Status: SHIPPED | OUTPATIENT
Start: 2022-01-30 | End: 2022-02-23

## 2022-02-23 DIAGNOSIS — N52.9 ERECTILE DYSFUNCTION, UNSPECIFIED ERECTILE DYSFUNCTION TYPE: ICD-10-CM

## 2022-02-23 RX ORDER — TADALAFIL 5 MG/1
TABLET ORAL
Qty: 30 TABLET | Refills: 0 | Status: SHIPPED | OUTPATIENT
Start: 2022-02-23 | End: 2022-04-12

## 2022-03-07 NOTE — TELEPHONE ENCOUNTER
RECORDS RECEIVED FROM: internal    DATE RECEIVED: 5.12.22    NOTES (FOR ALL VISITS) STATUS DETAILS   OFFICE NOTES from referring provider internal  Jose De Jesus Mars MD   OFFICE NOTES from other specialist internal  7/29/21 [facility]   6.2.21 [facility]    ED NOTES     OPERATIVE REPORT  (thyroid, pituitary, adrenal, parathyroid)     MEDICATION LIST internal     IMAGING      DEXASCAN     MRI (BRAIN)     XR (Chest)     CT (HEAD/NECK/CHEST/ABDOMEN)     NUCLEAR      ULTRASOUND (HEAD/NECK)     LABS     DIABETES: HBGA1C, CREATININE, FASTING LIPIDS, MICROALBUMIN URINE, POTASSIUM, TSH, T4    THYROID: TSH, T4, CBC, THYRODLONULIN, TOTAL T3, FREE T4, CALCITONIN, CEA internal  Lipid- 6.2.21  HBGA1C- 6.2.21

## 2022-04-12 DIAGNOSIS — E11.65 TYPE 2 DIABETES MELLITUS WITH HYPERGLYCEMIA, WITHOUT LONG-TERM CURRENT USE OF INSULIN (H): ICD-10-CM

## 2022-04-12 DIAGNOSIS — N52.9 ERECTILE DYSFUNCTION, UNSPECIFIED ERECTILE DYSFUNCTION TYPE: ICD-10-CM

## 2022-04-12 DIAGNOSIS — E11.9 TYPE 2 DIABETES MELLITUS WITHOUT COMPLICATION, WITHOUT LONG-TERM CURRENT USE OF INSULIN (H): Chronic | ICD-10-CM

## 2022-04-12 DIAGNOSIS — Z79.4 TYPE 2 DIABETES MELLITUS WITH HYPERGLYCEMIA, WITH LONG-TERM CURRENT USE OF INSULIN (H): ICD-10-CM

## 2022-04-12 DIAGNOSIS — E11.65 TYPE 2 DIABETES MELLITUS WITH HYPERGLYCEMIA, WITH LONG-TERM CURRENT USE OF INSULIN (H): ICD-10-CM

## 2022-04-12 DIAGNOSIS — K21.00 GASTROESOPHAGEAL REFLUX DISEASE WITH ESOPHAGITIS, UNSPECIFIED WHETHER HEMORRHAGE: Primary | ICD-10-CM

## 2022-04-12 RX ORDER — DAPAGLIFLOZIN 10 MG/1
10 TABLET, FILM COATED ORAL DAILY
Qty: 90 TABLET | Refills: 3 | Status: SHIPPED | OUTPATIENT
Start: 2022-04-12 | End: 2023-07-01

## 2022-04-12 RX ORDER — OMEPRAZOLE 40 MG/1
40 CAPSULE, DELAYED RELEASE ORAL DAILY
Qty: 30 CAPSULE | Refills: 0 | Status: SHIPPED | OUTPATIENT
Start: 2022-04-12 | End: 2023-02-22

## 2022-04-12 RX ORDER — DULAGLUTIDE 3 MG/.5ML
3 INJECTION, SOLUTION SUBCUTANEOUS
Qty: 6 ML | Refills: 0 | Status: SHIPPED | OUTPATIENT
Start: 2022-04-12 | End: 2022-08-09

## 2022-04-12 RX ORDER — ATORVASTATIN CALCIUM 40 MG/1
40 TABLET, FILM COATED ORAL DAILY
Qty: 30 TABLET | Refills: 0 | Status: SHIPPED | OUTPATIENT
Start: 2022-04-12 | End: 2022-08-23

## 2022-04-12 RX ORDER — METFORMIN HCL 500 MG
TABLET, EXTENDED RELEASE 24 HR ORAL
Qty: 120 TABLET | Refills: 0 | Status: SHIPPED | OUTPATIENT
Start: 2022-04-12 | End: 2022-08-09

## 2022-04-12 RX ORDER — LISINOPRIL 2.5 MG/1
2.5 TABLET ORAL DAILY
Qty: 30 TABLET | Refills: 0 | Status: SHIPPED | OUTPATIENT
Start: 2022-04-12 | End: 2023-02-22

## 2022-04-12 RX ORDER — DULAGLUTIDE 1.5 MG/.5ML
1.5 INJECTION, SOLUTION SUBCUTANEOUS
Qty: 24 ML | Refills: 1 | Status: SHIPPED | OUTPATIENT
Start: 2022-04-12 | End: 2022-05-12

## 2022-04-12 RX ORDER — PROCHLORPERAZINE 25 MG/1
SUPPOSITORY RECTAL
Qty: 1 EACH | Refills: 3 | Status: SHIPPED | OUTPATIENT
Start: 2022-04-12 | End: 2023-05-16

## 2022-04-12 RX ORDER — INSULIN DEGLUDEC 100 U/ML
INJECTION, SOLUTION SUBCUTANEOUS
Qty: 45 ML | Refills: 0 | Status: SHIPPED | OUTPATIENT
Start: 2022-04-12 | End: 2022-11-22

## 2022-04-12 RX ORDER — TADALAFIL 5 MG/1
TABLET ORAL
Qty: 30 TABLET | Refills: 0 | Status: SHIPPED | OUTPATIENT
Start: 2022-04-12 | End: 2022-05-13

## 2022-04-12 RX ORDER — PEN NEEDLE, DIABETIC 32GX 5/32"
NEEDLE, DISPOSABLE MISCELLANEOUS
Qty: 100 EACH | Refills: 3 | Status: SHIPPED | OUTPATIENT
Start: 2022-04-12

## 2022-04-12 RX ORDER — FLASH GLUCOSE SENSOR
KIT MISCELLANEOUS
Qty: 2 EACH | Refills: 0 | Status: SHIPPED | OUTPATIENT
Start: 2022-04-12 | End: 2022-05-12

## 2022-04-12 NOTE — TELEPHONE ENCOUNTER
Routing refill request to provider for review/approval because:  Labs not current:  A1C    Patient is out of medications    Gisela Castellano RN

## 2022-04-12 NOTE — TELEPHONE ENCOUNTER
Patient's wife is calling for patient's medication refill.    Patient is out of his medications.  He took the last dose today.    Patient's father recently passed away.    He is due for a preventative visit.  Scheduled June 8th with PCP.    Gisela Castellano RN

## 2022-05-11 NOTE — PROGRESS NOTES
Subjective:    Established patient, previously saw Dr. Madisyn Doty is a 46 year old male who presents to review DM.    Diagnosed with DM: 2016 he developed polyuria and was diagnosed with DM, he does not recall exactly when he first started insulin and this is a bit unclear from chart review     History of DKA/HHS: no    FH of DM: maternal aunt with DM-2, maternal uncle with DM-2    Glycemic control over the years:     -HbA1c 7.2% 5/2022        Current DM therapy:  -Metformin XR 1000 mg daily  -Farxiga 10 mg daily   -Trulicity 3 mg weekly   -Tresiba 50-0-0-0    Prior DM therapy:  -No recent use of prandial insulin     Current glycemic control: see CGM data    Diet: breakfast sandwich ([employer]), lunch from [employer], PB[employer]NEGRO for snacks, dinner is protein and vegetables, doesn't drink calories, snacks at night at times as well    Physical activity: sedentary     Tobacco/alcohol use: no tobacco use, no significant alcohol use    Complications noted in the A/P section.     No personal/FH: pancreatitis, pancreatic cancer, thyroid cancer, MEN     No urinary tract infection.     BMI 46.17 kg/m2; weight has been stable/slightly increased over the past 1 year. He recalls using an over the counter weight loss medication >10 years ago. No prior weight loss surgeries/procedures.         Objective:    /99, BMI 46.17 kg/m2    Exam deferred.    Assessment/Plan:    # DM-2  -CT A/P 1/2019: per OSH radiology pancreas unremarkable   -7/17/2020: C-peptide 6.0 ng/mL with concurrent glucose 320 mg/dL, CHRISTOPHER Ab undetectable   # Mild non proliferative diabetic retinopathy on eye exam 6/2021  # Hypertension, microalbuminuria, on lisinopril   -6/2021: GFR >90  -7/2020: urine microalbumin 31.48 mg/g Cr  # No peripheral neuropathy, no prior DM ulceration  # No prior ASCVD event   # Mixed hyperlipidemia, on atorvastatin 40 mg daily   -6/2021: , , HDL 48, LDL 54  # Medically complicated obesity  # Hepatic  steatosis  # DENISE, uses a CPAP intermittently    Currently his glycemic control is quite good.  We reviewed his CGM data in detail.  He has frequent diarrhea and so he will stop metformin.  The only other change we will make is substituting semaglutide in for Trulicity and he will uptitrate to 2.4 mg of semaglutide weekly.  We reviewed strategies for weight loss in detail.  We will see how he does in the coming months and can also consider referral to the weight management clinic down the road.  He is interested in seeing a dietitian and I have made this referral.  He will pursue a local diabetes eye exam.  I will see him back in 3 months and I ordered labs including hemoglobin A1c, BMP, lipid panel, urine microalbumin.    49 minutes spent on the date of the encounter doing chart review, history and exam, documentation and further activities as noted above.

## 2022-05-12 ENCOUNTER — PRE VISIT (OUTPATIENT)
Dept: ENDOCRINOLOGY | Facility: CLINIC | Age: 47
End: 2022-05-12

## 2022-05-12 ENCOUNTER — OFFICE VISIT (OUTPATIENT)
Dept: ENDOCRINOLOGY | Facility: CLINIC | Age: 47
End: 2022-05-12
Payer: COMMERCIAL

## 2022-05-12 VITALS
HEART RATE: 80 BPM | BODY MASS INDEX: 42.66 KG/M2 | HEIGHT: 72 IN | WEIGHT: 315 LBS | DIASTOLIC BLOOD PRESSURE: 99 MMHG | SYSTOLIC BLOOD PRESSURE: 154 MMHG

## 2022-05-12 DIAGNOSIS — Z79.4 TYPE 2 DIABETES MELLITUS WITH BOTH EYES AFFECTED BY MILD NONPROLIFERATIVE RETINOPATHY WITHOUT MACULAR EDEMA, WITH LONG-TERM CURRENT USE OF INSULIN (H): ICD-10-CM

## 2022-05-12 DIAGNOSIS — E11.29 TYPE 2 DIABETES MELLITUS WITH MICROALBUMINURIA, WITH LONG-TERM CURRENT USE OF INSULIN (H): ICD-10-CM

## 2022-05-12 DIAGNOSIS — R80.9 TYPE 2 DIABETES MELLITUS WITH MICROALBUMINURIA, WITH LONG-TERM CURRENT USE OF INSULIN (H): ICD-10-CM

## 2022-05-12 DIAGNOSIS — E66.813 CLASS 3 SEVERE OBESITY DUE TO EXCESS CALORIES WITH SERIOUS COMORBIDITY AND BODY MASS INDEX (BMI) OF 45.0 TO 49.9 IN ADULT (H): ICD-10-CM

## 2022-05-12 DIAGNOSIS — N52.9 ERECTILE DYSFUNCTION, UNSPECIFIED ERECTILE DYSFUNCTION TYPE: ICD-10-CM

## 2022-05-12 DIAGNOSIS — E78.2 MIXED HYPERLIPIDEMIA: ICD-10-CM

## 2022-05-12 DIAGNOSIS — E11.3293 TYPE 2 DIABETES MELLITUS WITH BOTH EYES AFFECTED BY MILD NONPROLIFERATIVE RETINOPATHY WITHOUT MACULAR EDEMA, WITH LONG-TERM CURRENT USE OF INSULIN (H): ICD-10-CM

## 2022-05-12 DIAGNOSIS — E66.01 CLASS 3 SEVERE OBESITY DUE TO EXCESS CALORIES WITH SERIOUS COMORBIDITY AND BODY MASS INDEX (BMI) OF 45.0 TO 49.9 IN ADULT (H): ICD-10-CM

## 2022-05-12 DIAGNOSIS — Z79.4 TYPE 2 DIABETES MELLITUS WITH MICROALBUMINURIA, WITH LONG-TERM CURRENT USE OF INSULIN (H): ICD-10-CM

## 2022-05-12 DIAGNOSIS — Z79.4 TYPE 2 DIABETES MELLITUS WITH HYPERGLYCEMIA, WITH LONG-TERM CURRENT USE OF INSULIN (H): Primary | ICD-10-CM

## 2022-05-12 DIAGNOSIS — I10 HYPERTENSION, UNSPECIFIED TYPE: ICD-10-CM

## 2022-05-12 DIAGNOSIS — E11.65 TYPE 2 DIABETES MELLITUS WITH HYPERGLYCEMIA, WITH LONG-TERM CURRENT USE OF INSULIN (H): Primary | ICD-10-CM

## 2022-05-12 DIAGNOSIS — K76.0 HEPATIC STEATOSIS: ICD-10-CM

## 2022-05-12 LAB — HBA1C MFR BLD: 7.2 % (ref 4.3–?)

## 2022-05-12 PROCEDURE — 99215 OFFICE O/P EST HI 40 MIN: CPT | Performed by: INTERNAL MEDICINE

## 2022-05-12 PROCEDURE — 83036 HEMOGLOBIN GLYCOSYLATED A1C: CPT | Performed by: INTERNAL MEDICINE

## 2022-05-12 ASSESSMENT — PAIN SCALES - GENERAL: PAINLEVEL: MILD PAIN (2)

## 2022-05-12 NOTE — NURSING NOTE
Chief Complaint   Patient presents with     Diabetes     Vital signs:      BP: (!) 154/99 Pulse: 80           Height: 182.9 cm (6') Weight: (!) 154.4 kg (340 lb 6.4 oz)  Estimated body mass index is 46.17 kg/m  as calculated from the following:    Height as of this encounter: 1.829 m (6').    Weight as of this encounter: 154.4 kg (340 lb 6.4 oz).

## 2022-05-12 NOTE — TELEPHONE ENCOUNTER
Next 5 appointments (look out 90 days)      Jun 08, 2022  8:00 AM  (Arrive by 7:40 AM)  Adult Preventative Visit with Triston Hills MD  [facility] ([facility] ) [address] [facility] [id number] [phone]          Can pt have larger dispense amount?  Thalia Clarke BSN, RN

## 2022-05-12 NOTE — LETTER
5/12/2022       RE: Norberto Doty  [address] [facility] [address]     Dear Colleague,    Thank you for referring your patient, Norberto Doty, to the [facility] [place] [facility]. Please see a copy of my visit note below.    Subjective:    Established patient, previously saw Dr. Mars    Norberto Doty is a 46 year old male who presents to review DM.    Diagnosed with DM: 2016 he developed polyuria and was diagnosed with DM, he does not recall exactly when he first started insulin and this is a bit unclear from chart review     History of DKA/HHS: no    FH of DM: maternal aunt with DM-2, maternal uncle with DM-2    Glycemic control over the years:     -HbA1c 7.2% 5/2022        Current DM therapy:  -Metformin XR 1000 mg daily  -Farxiga 10 mg daily   -Trulicity 3 mg weekly   -Tresiba 50-0-0-0    Prior DM therapy:  -No recent use of prandial insulin     Current glycemic control: see CGM data    Diet: breakfast sandwich ([employer]), lunch from [employer], PB&J for snacks, dinner is protein and vegetables, doesn't drink calories, snacks at night at times as well    Physical activity: sedentary     Tobacco/alcohol use: no tobacco use, no significant alcohol use    Complications noted in the A/P section.     No personal/FH: pancreatitis, pancreatic cancer, thyroid cancer, MEN     No urinary tract infection.     BMI 46.17 kg/m2; weight has been stable/slightly increased over the past 1 year. He recalls using an over the counter weight loss medication >10 years ago. No prior weight loss surgeries/procedures.         Objective:    /99, BMI 46.17 kg/m2    Exam deferred.    Assessment/Plan:    # DM-2  -CT A/P 1/2019: per OSH radiology pancreas unremarkable   -7/17/2020: C-peptide 6.0 ng/mL with concurrent glucose 320 mg/dL, CHRISTOPHER Ab undetectable   # Mild non proliferative diabetic retinopathy on eye exam 6/2021  #  Hypertension, microalbuminuria, on lisinopril   -6/2021: GFR >90  -7/2020: urine microalbumin 31.48 mg/g Cr  # No peripheral neuropathy, no prior DM ulceration  # No prior ASCVD event   # Mixed hyperlipidemia, on atorvastatin 40 mg daily   -6/2021: , , HDL 48, LDL 54  # Medically complicated obesity  # Hepatic steatosis  # DENISE, uses a CPAP intermittently    Currently his glycemic control is quite good.  We reviewed his CGM data in detail.  He has frequent diarrhea and so he will stop metformin.  The only other change we will make is substituting semaglutide in for Trulicity and he will uptitrate to 2.4 mg of semaglutide weekly.  We reviewed strategies for weight loss in detail.  We will see how he does in the coming months and can also consider referral to the weight management clinic down the road.  He is interested in seeing a dietitian and I have made this referral.  He will pursue a local diabetes eye exam.  I will see him back in 3 months and I ordered labs including hemoglobin A1c, BMP, lipid panel, urine microalbumin.    49 minutes spent on the date of the encounter doing chart review, history and exam, documentation and further activities as noted above.     Sincerely,    Sanjeev Ren MD

## 2022-05-12 NOTE — TELEPHONE ENCOUNTER
Pt wife calling to request refill to be written for 90 days instead of 30 days. Pt has one day left.  Ana Olea

## 2022-05-13 RX ORDER — TADALAFIL 5 MG/1
TABLET ORAL
Qty: 30 TABLET | Refills: 0 | Status: SHIPPED | OUTPATIENT
Start: 2022-05-13 | End: 2022-06-22

## 2022-05-16 ENCOUNTER — DOCUMENTATION ONLY (OUTPATIENT)
Dept: SLEEP MEDICINE | Facility: CLINIC | Age: 47
End: 2022-05-16
Payer: COMMERCIAL

## 2022-05-17 ENCOUNTER — OFFICE VISIT (OUTPATIENT)
Dept: OPTOMETRY | Facility: CLINIC | Age: 47
End: 2022-05-17
Payer: COMMERCIAL

## 2022-05-17 ENCOUNTER — TELEPHONE (OUTPATIENT)
Dept: ENDOCRINOLOGY | Facility: CLINIC | Age: 47
End: 2022-05-17

## 2022-05-17 DIAGNOSIS — E11.3293 TYPE 2 DIABETES MELLITUS WITH BOTH EYES AFFECTED BY MILD NONPROLIFERATIVE RETINOPATHY WITHOUT MACULAR EDEMA, WITH LONG-TERM CURRENT USE OF INSULIN (H): Primary | ICD-10-CM

## 2022-05-17 DIAGNOSIS — Z79.4 TYPE 2 DIABETES MELLITUS WITH BOTH EYES AFFECTED BY MILD NONPROLIFERATIVE RETINOPATHY WITHOUT MACULAR EDEMA, WITH LONG-TERM CURRENT USE OF INSULIN (H): Primary | ICD-10-CM

## 2022-05-17 DIAGNOSIS — H52.4 PRESBYOPIA: ICD-10-CM

## 2022-05-17 DIAGNOSIS — H52.13 MYOPIA OF BOTH EYES: ICD-10-CM

## 2022-05-17 DIAGNOSIS — H52.223 REGULAR ASTIGMATISM OF BOTH EYES: ICD-10-CM

## 2022-05-17 PROCEDURE — 92014 COMPRE OPH EXAM EST PT 1/>: CPT | Performed by: OPTOMETRIST

## 2022-05-17 PROCEDURE — 92015 DETERMINE REFRACTIVE STATE: CPT | Performed by: OPTOMETRIST

## 2022-05-17 PROCEDURE — 92310 CONTACT LENS FITTING OU: CPT | Mod: GA | Performed by: OPTOMETRIST

## 2022-05-17 ASSESSMENT — REFRACTION_CURRENTRX
OD_SPHERE: -9.25
OD_CYLINDER: -1.25
OS_DIAMETER: 9.5
OD_AXIS: 099
OS_SPHERE: -9.75
OS_CYLINDER: -2.00
OD_DIAMETER: 9.5
OS_BASECURVE: 7.74
OD_BASECURVE: 7.85
OS_AXIS: 074

## 2022-05-17 ASSESSMENT — KERATOMETRY
OS_AXISANGLE2_DEGREES: 6
OD_AXISANGLE2_DEGREES: 29
OD_K2POWER_DIOPTERS: 43.00
OS_K1POWER_DIOPTERS: 43.25
OD_K1POWER_DIOPTERS: 43.50
OS_K2POWER_DIOPTERS: 43.50

## 2022-05-17 ASSESSMENT — VISUAL ACUITY
OD_PH_CC: 20/40
OS_CC+: -2
OS_CC: 20/20
OD_CC: 20/50
OS_CC: 20/50
METHOD: SNELLEN - LINEAR
OD_CC: 20/100-2
OD_CC+: -1
OD_PH_CC+: -3
CORRECTION_TYPE: CONTACTS

## 2022-05-17 ASSESSMENT — CUP TO DISC RATIO
OD_RATIO: 0.2
OS_RATIO: 0.2

## 2022-05-17 ASSESSMENT — CONF VISUAL FIELD
OS_NORMAL: 1
OD_NORMAL: 1
METHOD: COUNTING FINGERS

## 2022-05-17 ASSESSMENT — REFRACTION_MANIFEST
OD_SPHERE: -11.50
OD_CYLINDER: +1.50
OS_CYLINDER: +1.25
OS_SPHERE: -12.25
OS_CYLINDER: +2.00
OD_SPHERE: -10.75
OD_AXIS: 005
OS_AXIS: 170
OD_AXIS: 017
OS_ADD: +2.00
OD_ADD: +2.00
OS_SPHERE: -13.00
OD_CYLINDER: +1.50
OS_AXIS: 156
METHOD_AUTOREFRACTION: 1

## 2022-05-17 ASSESSMENT — REFRACTION_WEARINGRX
OS_SPHERE: -13.25
SPECS_TYPE: PROG
OS_CYLINDER: +1.50
OD_CYLINDER: +0.75
OD_SPHERE: -12.25
OS_AXIS: 170
OD_AXIS: 005
OD_ADD: +1.75
OS_ADD: +1.75

## 2022-05-17 ASSESSMENT — SLIT LAMP EXAM - LIDS
COMMENTS: 1+ PTOSIS
COMMENTS: 2+ PTOSIS

## 2022-05-17 ASSESSMENT — EXTERNAL EXAM - RIGHT EYE: OD_EXAM: NORMAL

## 2022-05-17 ASSESSMENT — TONOMETRY
OS_IOP_MMHG: 12
OD_IOP_MMHG: 14
IOP_METHOD: APPLANATION

## 2022-05-17 ASSESSMENT — EXTERNAL EXAM - LEFT EYE: OS_EXAM: NORMAL

## 2022-05-17 NOTE — PATIENT INSTRUCTIONS
Optional to fill new glasses prescription, minimal change  We will call you ASAP when RGP's arrive to schedule contact lenses dispense appointment     Work on improving blood sugar control  Return in 1 year for diabetic eye exam      Blood sugar and blood pressure control are very important in the prevention of ocular complications from diabetes.       Khadijah Rose, OD  [phone]

## 2022-05-17 NOTE — TELEPHONE ENCOUNTER
Called and spoke with the pharmacist at [facility]. Relayed the message below from Dr. Ren.    Sanjeev Ren MD  You 14 minutes ago (2:25 PM)     EDITA    Thanks, he can start directly at the 1.7 mg dose then. -Kavin    Message text

## 2022-05-17 NOTE — PROGRESS NOTES
Patient spouse came into [facility] with CPAP machine, stating that last night it quit working and would not turn on. Tried other outlets as well. Plugged machine in with patients power cord and turned on with no issues. Spouse states that patient takes care of own ordering of supplies and care of machine. Patient has not ordered supplies from [facility] since receiving the machine in 2019. Noted very dirty filter and mask. Educate spouse on re-order recommendations and cleaning, provided handout as well. Recommended patient see sleep provider in order to obtain new prescription for supplies. Spouse will update patient of all recommendations.     Yelena Mathew, DME Coordinator

## 2022-05-17 NOTE — TELEPHONE ENCOUNTER
Called and spoke with [facility] pharmacist. Per pharmacist they only have 1.7 mg and 2.4 mg available. She's not sure when they'll get the 1 mg doses in stock. Please review and advise.

## 2022-05-17 NOTE — LETTER
5/17/2022         RE: Norberto Doty  [address] [facility] [address]        Dear Colleague,    Thank you for referring your patient, Norberto Doty, to the [facility]. . Mild non proliferative diabetic retinopathy found in both eyes.         Again, thank you for allowing me to participate in the care of your patient.        Sincerely,        Khadijah Rose, OD

## 2022-05-17 NOTE — TELEPHONE ENCOUNTER
Please call the pharmacy and inform them the medication is to be titrated up. The pt will start with 1 mg x 2 weeks, then 1.7 mg x 2 weeks, then 2.4 mg going forward. If the pharmacy cannot break the boxes ( I believe they come in 4 week boxes) then the RX's need to be resent.

## 2022-05-17 NOTE — PROGRESS NOTES
Chief Complaint   Patient presents with     Diabetic Eye Exam     Sugars are still slightly high     Contact Lens Re-fitting        Previous contact lens wearer? Yes: Wears RGP   Comfort of contact lenses :Usually good, just craked the right RGP getting it out of case  Satisfied with current lenses: Currently wearing an old lens in his right eye       Hemoglobin A1C POCT   Date Value Ref Range Status   05/12/2022 7.2 4.3 - <5.7 % Final   06/02/2021 9.0 (H) 0 - 5.6 % Final     Comment:     Normal <5.7% Prediabetes 5.7-6.4%  Diabetes 6.5% or higher - adopted from ADA   consensus guidelines.     10/22/2020 7.1 (H) 0 - 5.6 % Final     Comment:     Results confirmed by repeat test  Normal <5.7% Prediabetes 5.7-6.4%  Diabetes 6.5% or higher - adopted from ADA   consensus guidelines.     07/08/2020 11.0 (H) 0 - 5.6 % Final     Comment:     Normal <5.7% Prediabetes 5.7-6.4%  Diabetes 6.5% or higher - adopted from ADA   consensus guidelines.  Results confirmed by repeat test         Last Eye Exam: 7/24/20  Dilated Previously: Yes    What are you currently using to see?  Contacts and now has a pair of back up glasses     Distance Vision Acuity: Not aware of any changes, but said that it's hard to tell     Near Vision Acuity: Satisfied with vision while reading and using computer with glasses or contacts, also sometimes holds things close and goes without correction     Eye Comfort: good  Do you use eye drops? 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       OBJECTIVE: See Ophthalmology exam    ASSESSMENT:    ICD-10-CM    1. Type 2 diabetes mellitus with both eyes affected by mild nonproliferative retinopathy without macular edema, with long-term current use of insulin (H)  E11.3293 EYE EXAM (SIMPLE-NONBILLABLE)    Z79.4 REFRACTION     CONTACT LENS FITTING,BILAT w/ signed  waiver   2. Regular astigmatism of both eyes  H52.223 EYE EXAM (SIMPLE-NONBILLABLE)     REFRACTION     CONTACT LENS FITTING,BILAT w/ signed waiver   3. Myopia of both eyes  H52.13 EYE EXAM (SIMPLE-NONBILLABLE)     REFRACTION     CONTACT LENS FITTING,BILAT w/ signed waiver   4. Presbyopia  H52.4 EYE EXAM (SIMPLE-NONBILLABLE)     REFRACTION     CONTACT LENS FITTING,BILAT w/ signed waiver      PLAN:     Patient Instructions   Optional to fill new glasses prescription, minimal change  We will call you ASAP when RGP's arrive to schedule contact lenses dispense appointment     Work on improving blood sugar control  Return in 1 year for diabetic eye exam      Blood sugar and blood pressure control are very important in the prevention of ocular complications from diabetes.       Khadijah Rose, OD  [phone]

## 2022-05-18 ENCOUNTER — VIRTUAL VISIT (OUTPATIENT)
Dept: SLEEP MEDICINE | Facility: CLINIC | Age: 47
End: 2022-05-18
Payer: COMMERCIAL

## 2022-05-18 VITALS — BODY MASS INDEX: 42.66 KG/M2 | HEIGHT: 72 IN | WEIGHT: 315 LBS

## 2022-05-18 DIAGNOSIS — G47.33 OSA (OBSTRUCTIVE SLEEP APNEA): Primary | ICD-10-CM

## 2022-05-18 PROCEDURE — 99213 OFFICE O/P EST LOW 20 MIN: CPT | Mod: 95 | Performed by: PHYSICIAN ASSISTANT

## 2022-05-18 ASSESSMENT — SLEEP AND FATIGUE QUESTIONNAIRES
HOW LIKELY ARE YOU TO NOD OFF OR FALL ASLEEP WHILE SITTING AND READING: MODERATE CHANCE OF DOZING
HOW LIKELY ARE YOU TO NOD OFF OR FALL ASLEEP WHILE WATCHING TV: HIGH CHANCE OF DOZING
HOW LIKELY ARE YOU TO NOD OFF OR FALL ASLEEP WHILE SITTING INACTIVE IN A PUBLIC PLACE: MODERATE CHANCE OF DOZING
HOW LIKELY ARE YOU TO NOD OFF OR FALL ASLEEP WHEN YOU ARE A PASSENGER IN A CAR FOR AN HOUR WITHOUT A BREAK: HIGH CHANCE OF DOZING
HOW LIKELY ARE YOU TO NOD OFF OR FALL ASLEEP WHILE SITTING QUIETLY AFTER LUNCH WITHOUT ALCOHOL: MODERATE CHANCE OF DOZING
HOW LIKELY ARE YOU TO NOD OFF OR FALL ASLEEP IN A CAR, WHILE STOPPED FOR A FEW MINUTES IN TRAFFIC: WOULD NEVER DOZE
HOW LIKELY ARE YOU TO NOD OFF OR FALL ASLEEP WHILE SITTING AND TALKING TO SOMEONE: MODERATE CHANCE OF DOZING
HOW LIKELY ARE YOU TO NOD OFF OR FALL ASLEEP WHILE LYING DOWN TO REST IN THE AFTERNOON WHEN CIRCUMSTANCES PERMIT: HIGH CHANCE OF DOZING

## 2022-05-18 NOTE — PROGRESS NOTES
"Norberto is a 46 year old who is being evaluated via a billable video visit.      How would you like to obtain your AVS? MyChart  If the video visit is dropped, the invitation should be resent by: Text to cell phone: [phone]   Will anyone else be joining your video visit? No    Video Start Time: 10:59 AM  Video-Visit Details    Type of service:  Video Visit    Video End Time:11:45 AM    Originating Location (pt. Location): Home    Distant Location (provider location):  [facility]     Platform used for Video Visit: [facility]     Obstructive Sleep Apnea - PAP Follow-Up Visit:    Chief Complaint   Patient presents with     Video Visit       Norberto Doty comes in today for follow-up of their severe sleep apnea, managed with CPAP.     He presented with loud snoring, witnessed apnea, daytime sleepiness (ESS 10), difficulty maintaining sleep, crowded oropharynx, large neck circumference of 21\" and co-morbid DM II . The STOP-BANG score is 6/8.       Home Sleep Apnea Testing - 9/12/19: 317 lbs 0 oz: AHI 44.1/hr. Supine AHI 52.9/hr.   Oxygen Scooby of 77%. Baseline 91.5%. Sp02 =< 88% for 57.9 minutes.    October 9, 2019 Patient received a Resmed AirSense 10 Auto. Pressures were set at 6-15 cm H2O.    Overall, the patient rates his experience with PAP as good (0 poor, 10 great). The mask is comfortable. The mask is not leaking.  He is not snoring with the mask on. He is not having gasp arousals. He is not having any oral or nasal dryness. The pressure settings are comfortable.     His PAP interface is Nasal Pillows.    Bedtime is typically 10 PM. Usually it takes about 5 minutes to fall asleep with the mask on. Wake time is typically 4:45 AM.  Patient is using PAP therapy 6-7 hours per night. The patient is usually getting 6-7 hours of sleep per night.    He does feel rested in the morning.    Total score - Rincon: 17 (5/18/2022 11:19 AM)      RALF Total Score: 7      ResMed   Auto-PAP 6.0 - " 15.0 cmH2O 30 day usage data:    26% of days with > 4 hours of use. 21/30 days with no use.   Average use 116 minutes per day.   95%ile Leak 23.07 L/min.   CPAP 95% pressure 11.8 cm.   AHI 1.08 events per hour.     Current problems with PAP use include:  Falling asleep on the sofa    Past medical/surgical history, family history, social history, medications and allergies were reviewed.      Problem List:  Patient Active Problem List    Diagnosis Date Noted     DENISE (obstructive sleep apnea) 09/30/2019     Priority: Medium     Home Sleep Apnea Testing - 9/12/19: 317 lbs 0 oz: AHI 44.1/hr. Supine AHI 52.9/hr. Oxygen Scooby of 77%.  Baseline 91.5%.  Sp02 =< 88% for 57.9 minutes.       Type 2 diabetes mellitus (H) 09/16/2019     Priority: Medium     Essential hypertension 09/06/2019     Priority: Medium     Mixed hyperlipidemia 01/15/2019     Priority: Medium     Morbid obesity due to excess calories (H) 07/31/2017     Priority: Medium     Uncontrolled diabetes mellitus type 2 without complications 06/02/2016     Priority: Medium     IMO Regulatory Load OCT 2020       Punctate keratitis, bilateral 04/21/2016     Priority: Medium     RGP overwear          Ht 1.829 m (6')   Wt (!) 154.2 kg (340 lb)   BMI 46.11 kg/m      Impression/Plan:  severe obstructive sleep apnea-  Suboptimal CPAP compliance.   AHI is well controlled on CPAP 5-15 cm/H20. Daytime symptoms are improved when he uses CPAP.  Patient counseled to use CPAP with all sleep. Reviewed obstructive sleep apnea and potential consequences of untreated severe sleep apnea.  Comprehensive DME order placed.    Norberto Doty will follow up in about 6 month(s).     25 minutes spent on day of encounter doing chart review,  history and exam, counseling, coordinating plan of care, documentation and further activities as noted above.      Gabriel Harman PA-C

## 2022-05-19 NOTE — NURSING NOTE
Return in about 6 month reminder created and to be send via [employer]. Durable medical equipment order routed to [facility].      FARZANA Correia  [facility] [place]

## 2022-05-31 ENCOUNTER — OFFICE VISIT (OUTPATIENT)
Dept: OPTOMETRY | Facility: CLINIC | Age: 47
End: 2022-05-31
Payer: COMMERCIAL

## 2022-05-31 DIAGNOSIS — H52.4 PRESBYOPIA: ICD-10-CM

## 2022-05-31 DIAGNOSIS — H52.13 MYOPIA OF BOTH EYES: ICD-10-CM

## 2022-05-31 DIAGNOSIS — H52.223 REGULAR ASTIGMATISM OF BOTH EYES: Primary | ICD-10-CM

## 2022-05-31 PROCEDURE — 99207 PR NO CHARGE LOS: CPT | Performed by: OPTOMETRIST

## 2022-05-31 ASSESSMENT — REFRACTION_CURRENTRX
OS_AXIS: 074
OD_AXIS: 099
OS_SPHERE: -9.75
OD_BASECURVE: 7.85
OS_DIAMETER: 9.5
OD_SPHERE: -9.25
OS_CYLINDER: -2.00
OD_CYLINDER: -1.25
OD_DIAMETER: 9.5
OS_BASECURVE: 7.74

## 2022-05-31 ASSESSMENT — VISUAL ACUITY
CORRECTION_TYPE: CONTACTS
METHOD: SNELLEN - LINEAR
OS_CC: 20/30
OD_CC: 20/40
OD_CC: 20/70-2
OS_CC+: -2
OD_CC+: -2
OS_CC: 20/30

## 2022-05-31 NOTE — PROGRESS NOTES
Chief Complaint   Patient presents with     CL Dispense       Contact lenses dispensed    [employer]aley Apple Optometric Assistant        OBJECTIVE: See Ophthalmology exam     ASSESSMENT:    ICD-10-CM    1. Regular astigmatism of both eyes  H52.223    2. Myopia of both eyes  H52.13    3. Presbyopia  H52.4       PLAN:  Patient Instructions   Contact lenses prescription released to patient today.  Return to clinic as needed for contact lenses check appointment in 1-2 weeks as needed   Return to clinic 1 year for eye exam.    Khadijah Rose, OD  [phone]

## 2022-05-31 NOTE — PATIENT INSTRUCTIONS
Contact lenses prescription released to patient today.  Return to clinic as needed for contact lenses check appointment in 1-2 weeks as needed   Return to clinic 1 year for eye exam.    Khadijah Rose, OD  [phone]

## 2022-05-31 NOTE — LETTER
5/31/2022         RE: Norberto Doty  [address] [address]        Dear Colleague,    Thank you for referring your patient, Norberto Doty, to the [facility]. Please see a copy of my visit note below.    Chief Complaint   Patient presents with     CL Dispense       Contact lenses dispensed    Haley Apple Optometric Assistant        OBJECTIVE: See Ophthalmology exam     ASSESSMENT:    ICD-10-CM    1. Regular astigmatism of both eyes  H52.223    2. Myopia of both eyes  H52.13    3. Presbyopia  H52.4       PLAN:  Patient Instructions   Contact lenses prescription released to patient today.  Return to clinic as needed for contact lenses check appointment in 1-2 weeks as needed   Return to clinic 1 year for eye exam.    Khadijah Rose, OD  [phone]                                 Again, thank you for allowing me to participate in the care of your patient.        Sincerely,        Khadijah Rose, OD

## 2022-06-21 DIAGNOSIS — N52.9 ERECTILE DYSFUNCTION, UNSPECIFIED ERECTILE DYSFUNCTION TYPE: ICD-10-CM

## 2022-06-22 RX ORDER — TADALAFIL 5 MG/1
TABLET ORAL
Qty: 30 TABLET | Refills: 0 | Status: SHIPPED | OUTPATIENT
Start: 2022-06-22 | End: 2022-07-18

## 2022-06-27 ENCOUNTER — VIRTUAL VISIT (OUTPATIENT)
Dept: NUTRITION | Facility: CLINIC | Age: 47
End: 2022-06-27
Attending: INTERNAL MEDICINE
Payer: COMMERCIAL

## 2022-06-27 DIAGNOSIS — Z79.4 TYPE 2 DIABETES MELLITUS WITH HYPERGLYCEMIA, WITH LONG-TERM CURRENT USE OF INSULIN (H): ICD-10-CM

## 2022-06-27 DIAGNOSIS — E66.01 MORBID OBESITY DUE TO EXCESS CALORIES (H): Primary | ICD-10-CM

## 2022-06-27 DIAGNOSIS — E11.65 TYPE 2 DIABETES MELLITUS WITH HYPERGLYCEMIA, WITH LONG-TERM CURRENT USE OF INSULIN (H): ICD-10-CM

## 2022-06-27 PROCEDURE — 97802 MEDICAL NUTRITION INDIV IN: CPT | Mod: 95

## 2022-06-27 NOTE — PATIENT INSTRUCTIONS
When your gallbladder is removed, avoiding high fat meals is helpful. Fatty meats, fried foods and creamy sauces/dressing sometimes cause pain since there is more fat in the meal than your body can handle without the gallbladder.  Lower fat meals is better tolerated.      2. For diabetes, limiting the carbohydrates is recommended. Most men do well with 30-60 gm at meals and 15-30 gm for snacks.     3. For weight loss, want to swap in more fruits and vegetables for meals and snacks when possible (think of them as a low calorie fillers).  Ideas:     A) Buy some fruit at [facility] for a snack to replace the sandwich/Incrustable for one of the snacks.   -Can add some nuts (1/4 cup) or a string cheese if fruit alone is not enough    B). Add more veggies at meals. Try the Plate Method at meals:  1/2 plate veggies and/or fruit (raw, canned, frozen all fine)  1/4 plate lean meat (3-4 oz)  1/4 plate grains, ideally whole grains when able (1/2-1 cup rice/pasta/other grains/potatoes OR 1-2 slices bread)    Helpful Website: InnoCyte.gov    4. Aim for 30-60 minutes of activity a day or 10,000 steps a day.   -Another idea: can add some resistance training to help build muscle. This is recommended to include 2-3 days a week if this is easier to add since it sounds like you walk a lot already    5. Usually a good place to start for weight loss is cutting out 300-500 calories from your usual intake.     6. If you have another low blood glucose tonight or this week, the higher dose of Wegovy may reduce your insulin needs so your body may not need as much Tresiba anymore to control blood glucose overnight.   -Try to check blood glucose when a low blood glucose is indicated by Dexcom and if turns out to be normal, can calibrate in Dexcom to stop it from alarming again.     FOLLOW UP APPOINTMENT: Will call you again around 4:30pm on Monday, August 29th.    Eugenia Guidry RDN, LD, [facility]   [phone]

## 2022-06-27 NOTE — PROGRESS NOTES
Type of Service: Telephone Visit    Originating Location (Patient Location): Home  Distant Location (Provider Location): Home  Mode of Communication:  Telephone    Telephone Visit Start Time: 4:33pm  Telephone Visit End Time (telephone visit stop time): 5:24pm    How would patient [facility] to obtain [facility]? [facility]     Medical Nutrition Therapy for Diabetes  Visit Type:Initial assessment and intervention    Norberto Doty presents today for MNT and education related to type 2 diabetes and weight management.   He is accompanied by self and spouse, Caridad.     ASSESSMENT:   Patient comments/concerns relating to nutrition: Says they are trying to be smaller people so looking for guidance on eating well with diabetes.     Says also worries about his stomach- had gallbladder out late 2018.  Says he sometimes has stomach pain so may miss a meal if out in the field.  Says foods eaten out are more of a trigger.    Says trying to stay away from the fast food as much as he can and choose healthiest versions.     Taking diabetes medications?   yes:     Diabetes Medication(s)     Biguanides       metFORMIN (GLUCOPHAGE-XR) 500 MG 24 hr tablet    TAKE 2 TABLETS BY MOUTH ONCE DAILY WITH BREAKFAST     Patient not taking: Reported on 5/18/2022    Insulin       insulin degludec (TRESIBA FLEXTOUCH) 100 UNIT/ML pen    INJECT 50 UNITS SUBCUTANEOUSLY AT BEDTIME    Sodium-Glucose Co-Transporter 2 (SGLT2) Inhibitors       dapagliflozin (FARXIGA) 10 MG TABS tablet    Take 1 tablet (10 mg) by mouth daily    Incretin Mimetic Agents (GLP-1 Receptor Agonists)       Dulaglutide (TRULICITY) 3 MG/0.5ML SOPN    Inject 3 mg Subcutaneous every 7 days          NUTRITION HISTORY:  Wakes: 4:30am  Breakfast: 5:30-6am: sausage McMuffin with egg (1/2 bun) and egg McMuffin and large SF iced-coffee  AM: Incrustable PBJ  Lunch: [employer]- meatloaf with mashed potatoes OR chicken with daley and cheese OR turkey with mashed potatoes; trying to grab this instead of  fast food with SF Sprite   PM: Incrustable PBJ OR bag honey roasted peanuts  Dinner: veggies (steamed or fresh, grilled asparagus), meat, potato/rice/noodle with Sparkling ICE flavored water   Snacks: popcorn OR Rohan Pop OR Fruit pop if dinner is early- tries to stop eating 2 hours before bed  Beverages: Captain and diet coke 1-2x/day, Pop (Diet) 1-2x/day, Coffee drinks (sugar-free)1x/day and Water all day    Misses meals? Lunch sometimes  Eats out:  1 meals/per week (breakfast)    Previous diet education:  Yes when he was diagnosed    Food allergies/intolerances: None  Dislikes: [place] sprouts and liver     Diet is high in: protein  Diet is low in: fiber, fruits and vegetables    EXERCISE: Works for 12 hours - sometimes on computer or desk.     SOCIO/ECONOMIC:   Lives with: spouse    BLOOD GLUCOSE MONITORING:  Patient glucose self monitoring as follows: 5+ times daily.   BG meter: Dexcom G6 meter  BG results: not available but Dexcom went off last night for a low blood glucose. Says this is the first time it happened. Had higher dose of Wegovy    BG values are: unable to assess  Patient's most recent   Lab Results   Component Value Date    A1C 9.0 06/02/2021       MEDICATIONS:  Current Outpatient Medications   Medication     ACCU-CHEK GUIDE test strip     atorvastatin (LIPITOR) 40 MG tablet     blood glucose monitoring (ACCU-CHEK FASTCLIX) lancets     Carboxymeth-Glycerin-Polysorb (REFRESH OPTIVE ADVANCED) 0.5-1-0.5 % SOLN     Continuous Blood Gluc Sensor (DEXCOM G6 SENSOR) MISC     Continuous Blood Gluc Sensor (DEXCOM G6 SENSOR) MISC     Continuous Blood Gluc Transmit (DEXCOM G6 TRANSMITTER) MISC     Continuous Blood Gluc Transmit (DEXCOM G6 TRANSMITTER) MISC     dapagliflozin (FARXIGA) 10 MG TABS tablet     Dulaglutide (TRULICITY) 3 MG/0.5ML SOPN     insulin degludec (TRESIBA FLEXTOUCH) 100 UNIT/ML pen     insulin pen needle (BD RAMILA U/F) 32G X 4 MM miscellaneous     insulin pen needle (BD RAMILA U/F) 32G X 4  MM     lisinopril (ZESTRIL) 2.5 MG tablet     metFORMIN (GLUCOPHAGE-XR) 500 MG 24 hr tablet     omeprazole (PRILOSEC) 40 MG DR capsule     Semaglutide-Weight Management 1 MG/0.5ML SOAJ     Semaglutide-Weight Management 1.7 MG/0.75ML SOAJ     Semaglutide-Weight Management 2.4 MG/0.75ML SOAJ     tadalafil (CIALIS) 5 MG tablet     No current facility-administered medications for this visit.       LABS:  Lab Results   Component Value Date    A1C 9.0 06/02/2021     Lab Results   Component Value Date     06/02/2021     Lab Results   Component Value Date    LDL 54 06/02/2021     HDL Cholesterol   Date Value Ref Range Status   06/02/2021 48 >39 mg/dL Final   ]  GFR Estimate   Date Value Ref Range Status   06/02/2021 >90 >60 mL/min/[1.73_m2] Final     Comment:     Non  GFR Calc  Starting 12/18/2018, serum creatinine based estimated GFR (eGFR) will be   calculated using the Chronic Kidney Disease Epidemiology Collaboration   (CKD-EPI) equation.       GFR Estimate If Black   Date Value Ref Range Status   06/02/2021 >90 >60 mL/min/[1.73_m2] Final     Comment:      GFR Calc  Starting 12/18/2018, serum creatinine based estimated GFR (eGFR) will be   calculated using the Chronic Kidney Disease Epidemiology Collaboration   (CKD-EPI) equation.       Lab Results   Component Value Date    CR 0.87 06/02/2021     No results found for: MICROALBUMIN    ANTHROPOMETRICS:  Vitals: There were no vitals taken for this visit.  Estimated body mass index is 46.11 kg/m  as calculated from the following:    Height as of 5/18/22: 1.829 m (6').    Weight as of 5/18/22: 154.2 kg (340 lb).       Wt Readings from Last 5 Encounters:   05/18/22 (!) 154.2 kg (340 lb)   05/12/22 (!) 154.4 kg (340 lb 6.4 oz)   06/02/21 146.5 kg (323 lb)   09/10/20 142.4 kg (314 lb)   07/08/20 141.1 kg (311 lb)       Weight Change: unable to assess-no new weight since 5/18/22; at that time, lost 6.4 oz in 1 week.    ESTIMATED KCAL  REQUIREMENTS:  2959 kcal per day (based on last clinic weight from 5/18/22)    NUTRITION DIAGNOSIS: Overweight/Obesity related to excess energy intake as evidenced by BMI>40 and request to help with changes for weight loss.    NUTRITION INTERVENTION:  Nutrition Prescription: Carbohydrate Intake: 30-60 grams/meal and 15-30 grams/snacks  Education given to support: general nutrition guidelines, weight reduction, consistent meals, free foods, carb counting, labeling, artificial sweeteners, fat modification, exercise, dining out/special occasions, fiber, behavior modification, portion control and heart healthy diet  Education Materials Provided: Carbohydrate Counting Sheet and General, Healthful Mediterranean Nutrition Therapy  Motivational Interviewing    Discussion: Norberto and Caridad are looking for guidance on how to help improve Norberto's health with diabetes, not having a gallbladder and aiding weight loss.  Discussed trying to avoid fatty meals with gallbladder removal by choosing lean meats, healthy preparation methods and being careful of added sauces, creams and dressings.  Norberto already indicates often eating chicken for dinner; he likes seafood and may get turkey or chicken for lunch as well.      As far as diabetes, recommended to continue to limit carbohydrate intake and use Dexcom for help on possible problems meals or foods since likely will see a higher blood glucose when eating more carbohydrates than usual.  Informed Norberto most men do well with 30-60 gm at meals and 15-30 gm for snacks.  Based on diet recall, appears to be around this target for breakfast and lunch but uncertain about dinner.     For the weight loss, recommended increasing fruits and vegetables and activity.  Even though Norberto is watching fat and carbohydrate intake, not eating much fruits and vegetables other than veggies with dinner.  Suggested plate method for simplicity on balancing meals and discussed ways to swap them into the diet to  help reduce calories. Norberto identified he likes fruit and could buy some fruit at [facility] for a snack instead of a sandwich.      Reviewed benefits of activity for both weight loss and better blood glucose utilization. Norberto is interested to see how many steps he gets but does a lot of walking at work.  Suggested he could add some aerobic activity on weekends or if has more sedentary days at work or could consider weight/resistance training to complement walking a lot at work if he feels this is doable. Pt verbalized understanding of concepts discussed and recommendations provided.       PATIENT'S BEHAVIOR CHANGE GOALS:   See Patient Instructions for patient stated behavior change goals. AVS was sent by [employer].    MONITOR / EVALUATE:  RD will monitor/evaluate:  Food / Beverage / Nutrient intake   Pertinent Labs  Progress toward meeting stated nutrition-related goals  Weight change    FOLLOW-UP:  Call RD with questions/concerns.   Follow-up appointment scheduled on 8/29/22.     Eugenia Guidry RDN, LD, [facility]   Time spent in minutes: 51 minutes  Encounter: Individual telephone visit

## 2022-06-29 ENCOUNTER — NURSE TRIAGE (OUTPATIENT)
Dept: NURSING | Facility: CLINIC | Age: 47
End: 2022-06-29

## 2022-06-29 ENCOUNTER — TELEPHONE (OUTPATIENT)
Dept: ENDOCRINOLOGY | Facility: CLINIC | Age: 47
End: 2022-06-29

## 2022-06-29 NOTE — TELEPHONE ENCOUNTER
[facility] Call Center    Phone Message    May a detailed message be left on voicemail: no     Reason for Call: Symptoms or Concerns     If patient has red-flag symptoms, warm transfer to triage line    Current symptom or concern: low blood sugars over night    Symptoms have been present for:  1-2 day(s)    Has patient previously been seen for this? No      Are there any new or worsening symptoms? Yes: Patients wife calling with concerns for patients low blood sugar readings. Unsure if meter is not reading correctly patient was instructed to manually check blood sugars and notes low levels the past few nights having to eat candy bars. Patients wife not sure if new medication is assisting in low levels requesting a return call to discuss thank you.       Action Taken: Message routed to:  [facility] endo    Travel Screening: Not Applicable

## 2022-06-29 NOTE — TELEPHONE ENCOUNTER
"Wife Caridad calling with [facility] on file regarding pt's BG's    Last 2 nights pt's dexcom meter has woke her up with a \"low alarm\" with a reading of 50 and another reading saying \"low\", upon recheck with manual glucometer his sugar read 97 and wife states \"he was really not pleasant to deal with\" otherwise no symptoms reported    Pt was recently switched over to semaglutide and now is taking his goal dose of 2.4mg/week and wife wondering if that has something to do with it     Advised to call endo clinic where he was seen to check in with them and gave her the clinic number    Wife is agreeable and verbalizes understanding       Anuradha Holley RN [facility] June 29, 2022 4:21 PM              "

## 2022-06-30 NOTE — TELEPHONE ENCOUNTER
Spoke to patient and obtain CGM data will email to provider.    Pt did miss one week of the Wegovy- he went from 1mg then missed a week and then increase to the 1.7mg.    Pt only had lows on Monday and Tuesday.   Pt was recommend to have high protein snack at bed time as well.     Pt does believe it was due to the increase of dosage and missing a week of Wegovy and then going back on at a higher dosage.

## 2022-06-30 NOTE — TELEPHONE ENCOUNTER
Pt informed per  he can reduce his Tresiba dose from 50 to 40 units. Let us know if lows keep happening despite this

## 2022-07-17 DIAGNOSIS — N52.9 ERECTILE DYSFUNCTION, UNSPECIFIED ERECTILE DYSFUNCTION TYPE: ICD-10-CM

## 2022-07-18 RX ORDER — TADALAFIL 5 MG/1
TABLET ORAL
Qty: 30 TABLET | Refills: 0 | Status: SHIPPED | OUTPATIENT
Start: 2022-07-18 | End: 2022-08-19

## 2022-07-21 ENCOUNTER — TRANSFERRED RECORDS (OUTPATIENT)
Dept: HEALTH INFORMATION MANAGEMENT | Facility: CLINIC | Age: 47
End: 2022-07-21

## 2022-08-02 ENCOUNTER — LAB (OUTPATIENT)
Dept: LAB | Facility: CLINIC | Age: 47
End: 2022-08-02
Payer: COMMERCIAL

## 2022-08-02 DIAGNOSIS — E11.65 TYPE 2 DIABETES MELLITUS WITH HYPERGLYCEMIA, WITH LONG-TERM CURRENT USE OF INSULIN (H): ICD-10-CM

## 2022-08-02 DIAGNOSIS — Z79.4 TYPE 2 DIABETES MELLITUS WITH HYPERGLYCEMIA, WITH LONG-TERM CURRENT USE OF INSULIN (H): ICD-10-CM

## 2022-08-02 LAB
ANION GAP SERPL CALCULATED.3IONS-SCNC: 11 MMOL/L (ref 7–15)
BUN SERPL-MCNC: 11.6 MG/DL (ref 6–20)
CALCIUM SERPL-MCNC: 9.5 MG/DL (ref 8.6–10)
CHLORIDE SERPL-SCNC: 102 MMOL/L (ref 98–107)
CHOLEST SERPL-MCNC: 127 MG/DL
CREAT SERPL-MCNC: 0.79 MG/DL (ref 0.67–1.17)
CREAT UR-MCNC: 92.3 MG/DL
DEPRECATED HCO3 PLAS-SCNC: 26 MMOL/L (ref 22–29)
GFR SERPL CREATININE-BSD FRML MDRD: >90 ML/MIN/1.73M2
GLUCOSE SERPL-MCNC: 128 MG/DL (ref 70–99)
HBA1C MFR BLD: 6.5 % (ref 0–5.6)
HDLC SERPL-MCNC: 46 MG/DL
LDLC SERPL CALC-MCNC: 61 MG/DL
MICROALBUMIN UR-MCNC: <12 MG/L
MICROALBUMIN/CREAT UR: NORMAL MG/G{CREAT}
NONHDLC SERPL-MCNC: 81 MG/DL
POTASSIUM SERPL-SCNC: 4 MMOL/L (ref 3.4–5.3)
SODIUM SERPL-SCNC: 139 MMOL/L (ref 136–145)
TRIGL SERPL-MCNC: 99 MG/DL

## 2022-08-02 PROCEDURE — 80048 BASIC METABOLIC PNL TOTAL CA: CPT

## 2022-08-02 PROCEDURE — 83036 HEMOGLOBIN GLYCOSYLATED A1C: CPT

## 2022-08-02 PROCEDURE — 80061 LIPID PANEL: CPT

## 2022-08-02 PROCEDURE — 82043 UR ALBUMIN QUANTITATIVE: CPT

## 2022-08-02 PROCEDURE — 36415 COLL VENOUS BLD VENIPUNCTURE: CPT

## 2022-08-09 ENCOUNTER — OFFICE VISIT (OUTPATIENT)
Dept: ENDOCRINOLOGY | Facility: CLINIC | Age: 47
End: 2022-08-09
Payer: COMMERCIAL

## 2022-08-09 VITALS
WEIGHT: 315 LBS | BODY MASS INDEX: 43.94 KG/M2 | HEART RATE: 74 BPM | SYSTOLIC BLOOD PRESSURE: 130 MMHG | DIASTOLIC BLOOD PRESSURE: 78 MMHG

## 2022-08-09 DIAGNOSIS — E78.2 MIXED HYPERLIPIDEMIA: ICD-10-CM

## 2022-08-09 DIAGNOSIS — K76.0 HEPATIC STEATOSIS: ICD-10-CM

## 2022-08-09 DIAGNOSIS — E88.819 INSULIN RESISTANCE: ICD-10-CM

## 2022-08-09 DIAGNOSIS — E66.813 CLASS 3 SEVERE OBESITY DUE TO EXCESS CALORIES WITH SERIOUS COMORBIDITY AND BODY MASS INDEX (BMI) OF 40.0 TO 44.9 IN ADULT (H): ICD-10-CM

## 2022-08-09 DIAGNOSIS — E66.01 CLASS 3 SEVERE OBESITY DUE TO EXCESS CALORIES WITH SERIOUS COMORBIDITY AND BODY MASS INDEX (BMI) OF 40.0 TO 44.9 IN ADULT (H): ICD-10-CM

## 2022-08-09 DIAGNOSIS — E11.3293 TYPE 2 DIABETES MELLITUS WITH BOTH EYES AFFECTED BY MILD NONPROLIFERATIVE RETINOPATHY WITHOUT MACULAR EDEMA, WITH LONG-TERM CURRENT USE OF INSULIN (H): Primary | ICD-10-CM

## 2022-08-09 DIAGNOSIS — Z79.4 TYPE 2 DIABETES MELLITUS WITH BOTH EYES AFFECTED BY MILD NONPROLIFERATIVE RETINOPATHY WITHOUT MACULAR EDEMA, WITH LONG-TERM CURRENT USE OF INSULIN (H): Primary | ICD-10-CM

## 2022-08-09 DIAGNOSIS — I10 HYPERTENSION, UNSPECIFIED TYPE: ICD-10-CM

## 2022-08-09 DIAGNOSIS — Z79.4 LONG TERM (CURRENT) USE OF INSULIN (H): ICD-10-CM

## 2022-08-09 PROCEDURE — 99214 OFFICE O/P EST MOD 30 MIN: CPT | Performed by: INTERNAL MEDICINE

## 2022-08-09 PROCEDURE — 95251 CONT GLUC MNTR ANALYSIS I&R: CPT | Performed by: INTERNAL MEDICINE

## 2022-08-09 RX ORDER — DEXAMETHASONE 2 MG/1
2 TABLET ORAL ONCE
Qty: 1 TABLET | Refills: 0 | Status: SHIPPED | OUTPATIENT
Start: 2022-08-09 | End: 2023-02-22

## 2022-08-09 NOTE — LETTER
8/9/2022         RE: Norberto Doty  [address] [facility] [address]        Dear Colleague,    Thank you for referring your patient, Norberto Doty, to the [facility]. Please see a copy of my visit note below.    Subjective:    Established patient    Norberto Doty is a 46 year old male who presents for DM.     Current DM therapy:  -Metformin stopped 5/2022 due to frequent diarrhea and this has improved but not significantly   -Farxiga 10 mg daily   -Semaglutide started in place of Trulicity 3 mg weekly 5/2022; he's not sure of this dose but suspects he's on 2.4 mg weekly   -Tresiba 40-0-0-0    Dietician visit in 6/2022.     He has lost 16 # since our last visit in May 2022.        Objective:    BMI 43.9 kg/m2, /78    He does have a rounded face and plethora and some thinner but somewhat dark abdominal striae.     Thyroid examined and normal.    No lipodystrophy at his abdominal insulin injection sites.     DM foot exam performed and normal.     Assessment/Plan:    # DM-2  -CT A/P 1/2019: per OSH radiology pancreas unremarkable   -7/17/2020: C-peptide 6.0 ng/mL with concurrent glucose 320 mg/dL, CHRISTOPHER Ab undetectable   -8/2022: HbA1c 6.5%, glucose 128 mg/dL    # Mild non proliferative diabetic retinopathy on eye exam 5/2022  # Hypertension, on lisinopril   -8/2022: GFR >90, urine microalbumin normal   # No peripheral neuropathy, no prior DM ulceration  # No prior ASCVD event, not on ASA    # Mixed hyperlipidemia, on atorvastatin 40 mg daily   -8/2022: , TG 99, HDL 46, LDL 61  # Medically complicated obesity  # Hepatic steatosis  # DENISE, uses a CPAP intermittently     We reviewed his CGM data in detail.     He is doing well.  He has lost 16 pounds since starting Wegovy and I renewed the 2.4 mg per week rx.  We will not make any changes with his diabetes management plan.  I would like to rule out cortisol excess and I ordered a 2 mg dexamethasone suppression  test.  Assuming that is normal, which I expect, then I will see him back in 6 months and will place orders at that time.    34 minutes spent on the date of the encounter doing chart review, history and exam, documentation and further activities as noted above.         Again, thank you for allowing me to participate in the care of your patient.        Sincerely,        Sanjeev Ren MD

## 2022-08-09 NOTE — PROGRESS NOTES
Subjective:    Established patient    Norberto Doty is a 46 year old male who presents for DM.     Current DM therapy:  -Metformin stopped 5/2022 due to frequent diarrhea and this has improved but not significantly   -Farxiga 10 mg daily   -Semaglutide started in place of Trulicity 3 mg weekly 5/2022; he's not sure of this dose but suspects he's on 2.4 mg weekly   -Tresiba 40-0-0-0    Dietician visit in 6/2022.     He has lost 16 # since our last visit in May 2022.        Objective:    BMI 43.9 kg/m2, /78    He does have a rounded face and plethora and some thinner but somewhat dark abdominal striae.     Thyroid examined and normal.    No lipodystrophy at his abdominal insulin injection sites.     DM foot exam performed and normal.     Assessment/Plan:    # DM-2  -CT A/P 1/2019: per OSH radiology pancreas unremarkable   -7/17/2020: C-peptide 6.0 ng/mL with concurrent glucose 320 mg/dL, CHRISTOPHER Ab undetectable   -8/2022: HbA1c 6.5%, glucose 128 mg/dL    # Mild non proliferative diabetic retinopathy on eye exam 5/2022  # Hypertension, on lisinopril   -8/2022: GFR >90, urine microalbumin normal   # No peripheral neuropathy, no prior DM ulceration  # No prior ASCVD event, not on ASA    # Mixed hyperlipidemia, on atorvastatin 40 mg daily   -8/2022: , TG 99, HDL 46, LDL 61  # Medically complicated obesity  # Hepatic steatosis  # DENISE, uses a CPAP intermittently     We reviewed his CGM data in detail.     He is doing well.  He has lost 16 pounds since starting Wegovy and I renewed the 2.4 mg per week rx.  We will not make any changes with his diabetes management plan.  I would like to rule out cortisol excess and I ordered a 2 mg dexamethasone suppression test.  Assuming that is normal, which I expect, then I will see him back in 6 months and will place orders at that time.    34 minutes spent on the date of the encounter doing chart review, history and exam, documentation and further activities as noted above.

## 2022-08-19 DIAGNOSIS — N52.9 ERECTILE DYSFUNCTION, UNSPECIFIED ERECTILE DYSFUNCTION TYPE: ICD-10-CM

## 2022-08-19 RX ORDER — TADALAFIL 5 MG/1
TABLET ORAL
Qty: 30 TABLET | Refills: 0 | Status: SHIPPED | OUTPATIENT
Start: 2022-08-19 | End: 2022-08-24

## 2022-08-24 ENCOUNTER — OFFICE VISIT (OUTPATIENT)
Dept: FAMILY MEDICINE | Facility: CLINIC | Age: 47
End: 2022-08-24
Payer: COMMERCIAL

## 2022-08-24 VITALS
WEIGHT: 315 LBS | DIASTOLIC BLOOD PRESSURE: 79 MMHG | RESPIRATION RATE: 18 BRPM | HEART RATE: 82 BPM | HEIGHT: 72 IN | OXYGEN SATURATION: 95 % | BODY MASS INDEX: 42.66 KG/M2 | TEMPERATURE: 97.8 F | SYSTOLIC BLOOD PRESSURE: 137 MMHG

## 2022-08-24 DIAGNOSIS — N52.9 ERECTILE DYSFUNCTION, UNSPECIFIED ERECTILE DYSFUNCTION TYPE: ICD-10-CM

## 2022-08-24 DIAGNOSIS — Z11.59 NEED FOR HEPATITIS C SCREENING TEST: ICD-10-CM

## 2022-08-24 DIAGNOSIS — E11.9 TYPE 2 DIABETES MELLITUS WITHOUT COMPLICATION, WITHOUT LONG-TERM CURRENT USE OF INSULIN (H): Chronic | ICD-10-CM

## 2022-08-24 PROCEDURE — 99214 OFFICE O/P EST MOD 30 MIN: CPT | Performed by: FAMILY MEDICINE

## 2022-08-24 RX ORDER — TADALAFIL 5 MG/1
TABLET ORAL
Qty: 90 TABLET | Refills: 3 | Status: SHIPPED | OUTPATIENT
Start: 2022-08-24 | End: 2023-09-15

## 2022-08-24 RX ORDER — ATORVASTATIN CALCIUM 40 MG/1
40 TABLET, FILM COATED ORAL DAILY
Qty: 90 TABLET | Refills: 3 | Status: SHIPPED | OUTPATIENT
Start: 2022-08-24 | End: 2023-11-14

## 2022-08-24 ASSESSMENT — PAIN SCALES - GENERAL: PAINLEVEL: MILD PAIN (2)

## 2022-08-24 ASSESSMENT — PATIENT HEALTH QUESTIONNAIRE - PHQ9
SUM OF ALL RESPONSES TO PHQ QUESTIONS 1-9: 4
10. IF YOU CHECKED OFF ANY PROBLEMS, HOW DIFFICULT HAVE THESE PROBLEMS MADE IT FOR YOU TO DO YOUR WORK, TAKE CARE OF THINGS AT HOME, OR GET ALONG WITH OTHER PEOPLE: SOMEWHAT DIFFICULT
SUM OF ALL RESPONSES TO PHQ QUESTIONS 1-9: 4

## 2022-08-24 NOTE — PROGRESS NOTES
Subjective       History of Present Illness       Diabetes:   He presents for follow up of diabetes.  He is checking home blood glucose with a continuous glucose monitor.  He checks blood glucose before and after meals.  Blood glucose is sometimes over 200 and sometimes under 70. When his blood glucose is low, the patient is asymptomatic for confusion, blurred vision, lethargy and reports not feeling dizzy, shaky, or weak.  He has no concerns regarding his diabetes at this time.  He is not experiencing numbness or burning in feet, excessive thirst, blurry vision, weight changes or redness, sores or blisters on feet.         He eats 2-3 servings of fruits and vegetables daily.He consumes 0 sweetened beverage(s) daily.He exercises with enough effort to increase his heart rate 9 or less minutes per day.  He exercises with enough effort to increase his heart rate 3 or less days per week.   He is taking medications regularly.    Today's PHQ-9         PHQ-9 Total Score: 4    PHQ-9 Q9 Thoughts of better off dead/self-harm past 2 weeks :   Not at all    How difficult have these problems made it for you to do your work, take care of things at home, or get along with other people: Somewhat difficult     Lab Results   Component Value Date    A1C 6.5 08/02/2022    A1C 9.0 06/02/2021    A1C 7.1 10/22/2020    A1C 11.0 07/08/2020    A1C 6.9 01/08/2020    A1C 6.2 07/02/2019       Diabetes is followed by endocrine.    SUBJECTIVE:  46 year old enters for recheck of high cholesterol.  Pt. Has been taking med and has no side effects. Pt is following diet.  Denies chest pain and SOB.  No prblems with Cialis, no side effects  Past Medical History:   Diagnosis Date     Acute calculous cholecystitis 01/16/2019     Diabetes (H)      Hematuria 01/16/2019     Hypertension      Sepsis (H) 01/15/2019     Past Surgical History:   Procedure Laterality Date     NO HISTORY OF SURGERY         Current Outpatient Medications:      ACCU-CHEK GUIDE  test strip, USE  THREE TIMES DAILY OR  AS  DIRECTED, Disp: 300 strip, Rfl: 1     atorvastatin (LIPITOR) 40 MG tablet, Take 1 tablet (40 mg) by mouth daily, Disp: 90 tablet, Rfl: 3     blood glucose monitoring (ACCU-CHEK FASTCLIX) lancets, Use to test blood sugar 3 times daily or as directed.  Ok to substitute alternative if insurance prefers., Disp: 102 each, Rfl: 11     Carboxymeth-Glycerin-Polysorb (REFRESH OPTIVE ADVANCED) 0.5-1-0.5 % SOLN, Apply 1 drop to eye 2 times daily, Disp: , Rfl:      Continuous Blood Gluc Sensor (DEXCOM G6 SENSOR) MISC, 1 Box continuous, Disp: 9 each, Rfl: 11     Continuous Blood Gluc Sensor (DEXCOM G6 SENSOR) MISC, Change every 10 days., Disp: 3 each, Rfl: 11     Continuous Blood Gluc Transmit (DEXCOM G6 TRANSMITTER) MISC, Change every 3 months., Disp: 1 each, Rfl: 3     Continuous Blood Gluc Transmit (DEXCOM G6 TRANSMITTER) MISC, 1 Device continuous, Disp: 1 each, Rfl: 3     dapagliflozin (FARXIGA) 10 MG TABS tablet, Take 1 tablet (10 mg) by mouth daily, Disp: 90 tablet, Rfl: 3     insulin degludec (TRESIBA FLEXTOUCH) 100 UNIT/ML pen, INJECT 50 UNITS SUBCUTANEOUSLY AT BEDTIME, Disp: 45 mL, Rfl: 0     insulin pen needle (BD RAMILA U/F) 32G X 4 MM miscellaneous, Use 1 daily as directed., Disp: 100 each, Rfl: 3     insulin pen needle (BD RAMILA U/F) 32G X 4 MM, Use 1 daily as directed., Disp: 100 each, Rfl: 1     lisinopril (ZESTRIL) 2.5 MG tablet, Take 1 tablet (2.5 mg) by mouth daily, Disp: 30 tablet, Rfl: 0     omeprazole (PRILOSEC) 40 MG DR capsule, Take 1 capsule (40 mg) by mouth daily, Disp: 30 capsule, Rfl: 0     Semaglutide-Weight Management 2.4 MG/0.75ML SOAJ, Inject 2.4 mg Subcutaneous every 7 days, Disp: 9 mL, Rfl: 4     tadalafil (CIALIS) 5 MG tablet, TAKE 1 TABLET BY MOUTH EVERY 24 HOURS, Disp: 90 tablet, Rfl: 3     dexamethasone (DECADRON) 2 MG tablet, Take 1 tablet (2 mg) by mouth once for 1 dose Take at 11 PM, the next day go for an 8 AM blood draw, Disp: 1 tablet, Rfl:  0  Reviewed health maintenance   Patient Active Problem List   Diagnosis     Punctate keratitis, bilateral     Uncontrolled diabetes mellitus type 2 without complications     Morbid obesity due to excess calories (H)     Mixed hyperlipidemia     Essential hypertension     Type 2 diabetes mellitus (H)     DENISE (obstructive sleep apnea)       OBJECTIVE:  no apparent distress  /79   Pulse 82   Temp 97.8  F (36.6  C) (Oral)   Resp 18   Ht 1.829 m (6')   Wt 146.5 kg (323 lb)   SpO2 95%   BMI 43.81 kg/m        Exam:  Constitutional: healthy, alert and no distress  Head: Normocephalic. No masses, lesions, tenderness or abnormalities  Neck: Neck supple. No adenopathy. Thyroid symmetric, normal size,  Cardiovascular: negative, PMI normal. No lifts, heaves, or thrills. RRR. No murmurs, clicks gallops or rub  Respiratory: negative Lungs clear    Lab on 08/02/2022   Component Date Value Ref Range Status     Cholesterol 08/02/2022 127  <200 mg/dL Final     Triglycerides 08/02/2022 99  <150 mg/dL Final     Direct Measure HDL 08/02/2022 46  >=40 mg/dL Final     LDL Cholesterol Calculated 08/02/2022 61  <=100 mg/dL Final     Non HDL Cholesterol 08/02/2022 81  <130 mg/dL Final     Hemoglobin A1C 08/02/2022 6.5 (A) 0.0 - 5.6 % Final    Normal <5.7%   Prediabetes 5.7-6.4%    Diabetes 6.5% or higher     Note: Adopted from ADA consensus guidelines.     Albumin Urine mg/L 08/02/2022 <12.0  mg/L Final    The reference ranges have not been established in urine albumin. The results should be integrated into the clinical context for interpretation.     Albumin Urine mg/g Cr 08/02/2022    Final    Unable to calculate, urine albumin and/or urine creatinine is outside detectable limits.  Microalbuminuria is defined as an albumin:creatinine ratio of 17 to 299 for males and 25 to 299 for females. A ratio of albumin:creatinine of 300 or higher is indicative of overt proteinuria.  Due to biologic variability, positive results should be  confirmed by a second, first-morning random or 24-hour timed urine specimen. If there is discrepancy, a third specimen is recommended. When 2 out of 3 results are in the microalbuminuria range, this is evidence for incipient nephropathy and warrants increased efforts at glucose control, blood pressure control, and institution of therapy with an angiotensin-converting-enzyme (ACE) inhibitor (if the patient can tolerate it).       Creatinine Urine mg/dL 08/02/2022 92.3  mg/dL Final    The reference ranges have not been established in urine creatinine. The results should be integrated into the clinical context for interpretation.     Creatinine 08/02/2022 0.79  0.67 - 1.17 mg/dL Final     Sodium 08/02/2022 139  136 - 145 mmol/L Final     Potassium 08/02/2022 4.0  3.4 - 5.3 mmol/L Final     Urea Nitrogen 08/02/2022 11.6  6.0 - 20.0 mg/dL Final     Chloride 08/02/2022 102  98 - 107 mmol/L Final     Carbon Dioxide (CO2) 08/02/2022 26  22 - 29 mmol/L Final     Anion Gap 08/02/2022 11  7 - 15 mmol/L Final     Glucose 08/02/2022 128 (A) 70 - 99 mg/dL Final     GFR Estimate 08/02/2022 >90  >60 mL/min/1.73m2 Final    Effective December 21, 2021 eGFRcr in adults is calculated using the 2021 CKD-EPI creatinine equation which includes age and gender (Sanjuana et al., NEJ, DOI: 10.1056/BQYSus8327008)     Calcium 08/02/2022 9.5  8.6 - 10.0 mg/dL Final           ICD-10-CM    1. Type 2 diabetes mellitus without complication, without long-term current use of insulin (H)  E11.9 atorvastatin (LIPITOR) 40 MG tablet   2. Screen for colon cancer  Z12.11    3. Screening for HIV (human immunodeficiency virus)  Z11.4    4. Need for hepatitis C screening test  Z11.59 Hepatitis C Screen Reflex to HCV RNA Quant and Genotype   5. Erectile dysfunction, unspecified erectile dysfunction type  N52.9 tadalafil (CIALIS) 5 MG tablet    PLAN: Follow up in 1 year   Lose weight          .  ..  Patient answers are not available for this visit.

## 2022-08-27 ENCOUNTER — NURSE TRIAGE (OUTPATIENT)
Dept: NURSING | Facility: CLINIC | Age: 47
End: 2022-08-27

## 2022-08-27 NOTE — TELEPHONE ENCOUNTER
Consent to communicate on file to speak with wife Caridad.  Coronavirus (COVID-19) Notification    Caller Name (Patient, parent, daughter/son, grandparent, etc)  Wife    Reason for call  Notify of Positive Coronavirus (COVID-19)    Current Symptoms at time of phone call, reported by patient: (if no symptoms, document No symptoms]    Sinus congestion  Date of Symptom(s) onset (if applicable)    8/25/22    If at time of call, Patients symptoms hare worsened, the Patient should contact 911 or have someone drive them to Emergency Dept promptly:         If Patient calling 911, inform 911 personal that you have tested positive for the Coronavirus (COVID-19).  Place mask on and await 911 to arrive.       If Emergency Dept, If possible, please have another adult drive you to the Emergency Dept but you need to wear mask when in contact with other people.      Monoclonal Antibody Administration    You may be eligible to receive a new treatment with a monoclonal antibody for preventing hospitalization in patients at high risk for complications from COVID-19. This medication is still experimental and available on a limited basis; it is given through an IV and must be given at an infusion center. Please note that not all people who are eligible will receive the medication since it is in limited supply.  Is the patient symptomatic and onset of symptoms within the last 7 days?  Yes  Is the patient interested in a visit with a provider to discuss treatment options?: Yes  Is the patient seen at [facility]?  No: Warm transfer caller to [phone] to be scheduled with a virtual urgent provider.  During transfer, instruct  on appropriate time frame for visit     Review information with Patient    Your result was positive. This means you have COVID-19 (coronavirus).      How can I protect others?    These guidelines are for isolating before returning to work, school or .          If you DO have symptoms:       o    Stay home and away from others          ?    For at least 5 days after your symptoms started, AND           ?    You are fever free for 24 hours (with no medicine that reduces fever), AND          ?    Your other symptoms are better.      o    Wear a mask for 10 full days any time you are around others.       If you DON'T have symptoms:      o    Stay at home and away from others for at least 5 days after your positive test.      o    Wear a mask for 10 full days any time you are around others.    There may be different guidelines for healthcare facilities. Please check with the specific sites before arriving.     If you've been told by a doctor that you were severely ill with COVID-19 or are immunocompromised, you should isolate for at least 10 days.    You should not go back to work until you meet the guidelines above for ending your home isolation. You don't need to be retested for COVID-19 before going back to work--studies show that you won't spread the virus if it's been at least 10 days since your symptoms started (or 20 days, if you have a weak immune system).    Employers, schools, and daycares: This is an official notice for this person's medical guidelines for returning in-person. They must meet the above guidelines before going back to work, school, or  in person.    You will receive a positive COVID-19 letter via [employer] or the mail soon with additional self-care information.      Would you like me to review some of that information with you now?  Yes    How can I take care of myself?      Get lots of rest. Drink extra fluids (unless a doctor has told you not to).      Take Tylenol (acetaminophen) for fever or pain. If you have liver or kidney problems, ask your family doctor if it's okay to take Tylenol.     Take either:     650 mg (two 325 mg pills) every 4 to 6 hours, or     1,000 mg (two 500 mg pills) every 8 hours as needed.     Note: Do not take more than 3,000 mg in one day.  Acetaminophen is found in many medicines (both prescribed and over-the-counter medicines). Read all labels to be sure you don't take too much.    For children, check the Tylenol bottle for the right dose (based on their age or weight).      If you have other health problems (like cancer, heart failure, an organ transplant or severe kidney disease): Call your specialty clinic if you don't feel better in the next 2 days.      Know when to call 911: Emergency warning signs include:    Trouble breathing or shortness of breath    Pain or pressure in the chest that doesn't go away    Feeling confused like you haven't felt before, or not being able to wake up    Bluish-colored lips or face        If you were tested for an upcoming procedure, please contact your provider for next steps.     Reason for Disposition    HIGH RISK for severe COVID complications (e.g., weak immune system, age > 64 years, obesity with BMI > 25, pregnant, chronic lung disease or other chronic medical condition)  (Exception: Already seen by PCP and no new or worsening symptoms.)    Additional Information    Negative: SEVERE difficulty breathing (e.g., struggling for each breath, speaks in single words)    Negative: Difficult to awaken or acting confused (e.g., disoriented, slurred speech)    Negative: Bluish (or gray) lips or face now    Negative: Shock suspected (e.g., cold/pale/clammy skin, too weak to stand, low BP, rapid pulse)    Negative: Sounds like a life-threatening emergency to the triager    Negative: [1] Diagnosed or suspected COVID-19 AND [2] symptoms lasting 3 or more weeks    Negative: [1] COVID-19 exposure AND [2] no symptoms    Negative: COVID-19 vaccine reaction suspected (e.g., fever, headache, muscle aches) occurring 1 to 3 days after getting vaccine    Negative: COVID-19 vaccine, questions about    Negative: [1] Lives with someone known to have influenza (flu test positive) AND [2] flu-like symptoms (e.g., cough, runny nose,  sore throat, SOB; with or without fever)    Negative: [1] Adult with possible COVID-19 symptoms AND [2] triager concerned about severity of symptoms or other causes    Negative: COVID-19 and breastfeeding, questions about    Negative: SEVERE or constant chest pain or pressure  (Exception: Mild central chest pain, present only when coughing.)    Negative: MODERATE difficulty breathing (e.g., speaks in phrases, SOB even at rest, pulse 100-120)    Negative: [1] Headache AND [2] stiff neck (can't touch chin to chest)    Negative: Oxygen level (e.g., pulse oximetry) 90 percent or lower    Negative: Chest pain or pressure    Negative: Patient sounds very sick or weak to the triager    Negative: MILD difficulty breathing (e.g., minimal/no SOB at rest, SOB with walking, pulse <100)    Negative: Fever > 103 F (39.4 C)    Negative: [1] Fever > 101 F (38.3 C) AND [2] age > 60 years    Negative: [1] Fever > 100.0 F (37.8 C) AND [2] bedridden (e.g., nursing home patient, CVA, chronic illness, recovering from surgery)    Protocols used: CORONAVIRUS (COVID-19) DIAGNOSED OR [id number] 1.18.2022

## 2022-08-28 ENCOUNTER — VIRTUAL VISIT (OUTPATIENT)
Dept: URGENT CARE | Facility: CLINIC | Age: 47
End: 2022-08-28
Payer: COMMERCIAL

## 2022-08-28 DIAGNOSIS — U07.1 INFECTION DUE TO 2019 NOVEL CORONAVIRUS: Primary | ICD-10-CM

## 2022-08-28 PROCEDURE — 99213 OFFICE O/P EST LOW 20 MIN: CPT | Mod: CS

## 2022-08-28 NOTE — PROGRESS NOTES
Norberto is a 46 year old who is being evaluated via a billable video visit.      How would you like to obtain your AVS? MyChart  If the video visit is dropped, the invitation should be resent by: Text to cell phone: [phone]  Will anyone else be joining your video visit? No        Assessment & Plan     Infection due to 2019 novel coronavirus    - nirmatrelvir and ritonavir (PAXLOVID) therapy pack; Take 3 tablets by mouth 2 times daily for 5 days (Take 2 Nirmatrelvir tablets and 1 Ritonavir tablet twice daily for 5 days)    26 minutes spent on the date of the encounter doing chart review, history and exam, documentation and further activities per the note   :[id number]     BMI:   Estimated body mass index is 43.81 kg/m  as calculated from the following:    Height as of 8/24/22: 1.829 m (6').    Weight as of 8/24/22: 146.5 kg (323 lb).       Patient Instructions   Paxlovid sent in     Medication adjustments while on Paxlovid:    STOP taking for duration of PAxlovid     Lipitor  Cialis     May decrease effect of these medications while on Paxlovid     Porfirio Whittington  Omeprazole            No follow-ups on file.    Virtual Urgent Care  [facility] VIRTUAL URGENT CARE    Subjective   Norberto is a 46 year old presenting for the following health issues:  Covid Concern and Infection      HPI       COVID-19 Symptom Review  How many days ago did these symptoms start? 8/27/2022    Are any of the following symptoms significant for you?    New or worsening difficulty breathing? No    Worsening cough? Yes, it's a dry cough.     Fever or chills? Yes, I felt feverish or had chills.    Headache: No    Sore throat: YES    Chest pain: No    Diarrhea: No    Body aches? yes    fatigue     What treatments has patient tried? dyquil and nyquil   Does patient live in a nursing home, group home, or shelter? No  Does patient have a way to get food/medications during quarantined? Yes, I have a friend or family member who can help  me.      Review of Systems   Constitutional, HEENT, cardiovascular, pulmonary, GI, , musculoskeletal, neuro, skin, endocrine and psych systems are negative, except as otherwise noted.      Objective           Vitals:  No vitals were obtained today due to virtual visit.    Physical Exam   GENERAL:  alert and no distress  EYES: Eyes grossly normal to inspection.  No discharge or erythema, or obvious scleral/conjunctival abnormalities.  RESP: No audible wheeze, cough, or visible cyanosis.  No visible retractions or increased work of breathing.    SKIN: Visible skin clear. No significant rash, abnormal pigmentation or lesions.  NEURO: Cranial nerves grossly intact.  Mentation and speech appropriate for age.  PSYCH: Mentation appears normal, affect normal/bright, judgement and insight intact, normal speech and appearance well-groomed.            Video-Visit Details    Video Start Time: 1730    Type of service:  Video Visit    Video End Time:5:44 PM    Originating Location (pt. Location): Home    Distant Location (provider location):  [facility] VIRTUAL URGENT CARE     Platform used for Video Visit: [employer]    Cristina Evans.

## 2022-08-28 NOTE — PATIENT INSTRUCTIONS
Paxlovid sent in     Medication adjustments while on Paxlovid:    STOP taking for duration of PAxlovid     Lipitor  Cialis     May decrease effect of these medications while on Paxlovid     Porfirio Whittington  Omeprazole

## 2022-09-21 ENCOUNTER — TELEPHONE (OUTPATIENT)
Dept: FAMILY MEDICINE | Facility: CLINIC | Age: 47
End: 2022-09-21

## 2022-09-21 DIAGNOSIS — E11.65 TYPE 2 DIABETES MELLITUS WITH HYPERGLYCEMIA, WITHOUT LONG-TERM CURRENT USE OF INSULIN (H): ICD-10-CM

## 2022-09-21 RX ORDER — PROCHLORPERAZINE 25 MG/1
SUPPOSITORY RECTAL
Qty: 100 EACH | Refills: 0 | Status: SHIPPED | OUTPATIENT
Start: 2022-09-21 | End: 2022-09-21

## 2022-09-21 RX ORDER — PROCHLORPERAZINE 25 MG/1
SUPPOSITORY RECTAL
Qty: 1 EACH | Refills: 0 | Status: SHIPPED | OUTPATIENT
Start: 2022-09-21 | End: 2022-09-22

## 2022-09-21 NOTE — TELEPHONE ENCOUNTER
Fax message regarding Continuous Blood Gluc Sensor (DEXCOM G6 SENSOR) MISC rcvd:    QTY should be 1 box (3 sensors)  Not 100.     Please send.

## 2022-09-21 NOTE — TELEPHONE ENCOUNTER
Provider:  Please see the message from the pharmacy. Please place directions and change the quantity of the Prescription(s). Thank you. Leandra Chacon R.N.

## 2022-09-22 DIAGNOSIS — E11.65 TYPE 2 DIABETES MELLITUS WITH HYPERGLYCEMIA, WITHOUT LONG-TERM CURRENT USE OF INSULIN (H): ICD-10-CM

## 2022-09-22 RX ORDER — PROCHLORPERAZINE 25 MG/1
1 SUPPOSITORY RECTAL
Qty: 3 EACH | Refills: 1 | Status: SHIPPED | OUTPATIENT
Start: 2022-09-22 | End: 2023-02-12

## 2022-09-22 NOTE — TELEPHONE ENCOUNTER
There is a consent to communicate with Caridad Doty (wife on file dated 9/1/2020 (td-9/22/2022)    Provider: Are you willing to sent the requested Prescription(s)? Thank you. Leandra Chacon R.N.    Caridad (wife) is calling stating that the last Prescription(s) sent for his Continuous Blood Gluc Sensor (DEXCOM G6 SENSOR) MISC only allowed her to  1 month of his sensors. She is asking that a Prescription(s) for 3 months with a refill to get him to the 6 month mj. She was transferred to central scheduling to make his 6 month diabetes appointment.

## 2022-10-04 ENCOUNTER — TRANSFERRED RECORDS (OUTPATIENT)
Dept: HEALTH INFORMATION MANAGEMENT | Facility: CLINIC | Age: 47
End: 2022-10-04

## 2022-10-30 ENCOUNTER — HEALTH MAINTENANCE LETTER (OUTPATIENT)
Age: 47
End: 2022-10-30

## 2022-11-22 DIAGNOSIS — E11.9 TYPE 2 DIABETES MELLITUS WITHOUT COMPLICATION, WITHOUT LONG-TERM CURRENT USE OF INSULIN (H): Chronic | ICD-10-CM

## 2022-11-22 RX ORDER — INSULIN DEGLUDEC 100 U/ML
INJECTION, SOLUTION SUBCUTANEOUS
Qty: 45 ML | Refills: 0 | Status: SHIPPED | OUTPATIENT
Start: 2022-11-22 | End: 2023-02-22

## 2022-12-22 ENCOUNTER — TELEPHONE (OUTPATIENT)
Dept: FAMILY MEDICINE | Facility: CLINIC | Age: 47
End: 2022-12-22

## 2022-12-22 NOTE — TELEPHONE ENCOUNTER
Patient Quality Outreach    Patient is due for the following:   Diabetes -  Foot Exam  Physical Preventive Adult Physical      Topic Date Due     Hepatitis B Vaccine (1 of 3 - 3-dose series) Never done     Pneumococcal Vaccine (1 - PCV) Never done     COVID-19 Vaccine (4 - Booster for Pfizer series) 02/05/2022     Flu Vaccine (1) Never done       Next Steps:   Patient has upcoming appointment, these items will be addressed at that time.    Type of outreach:    Chart review performed, no outreach needed.      Questions for provider review:    None     STELLA ROMERO MA

## 2023-01-06 ENCOUNTER — TELEPHONE (OUTPATIENT)
Dept: ENDOCRINOLOGY | Facility: CLINIC | Age: 48
End: 2023-01-06

## 2023-01-10 ENCOUNTER — TELEPHONE (OUTPATIENT)
Dept: ENDOCRINOLOGY | Facility: CLINIC | Age: 48
End: 2023-01-10

## 2023-01-10 NOTE — TELEPHONE ENCOUNTER
[facility] Call Center    Phone Message    May a detailed message be left on voicemail: yes     Reason for Call: Other: patient's Spouse called in and stated the Wegovy, with the PA being updated now is there a new change with the step up, and side effects.  Please reach out to the patient's wife Caridad.     Action Taken: Other: Endo    Travel Screening: Not Applicable

## 2023-01-10 NOTE — TELEPHONE ENCOUNTER
Sorry,  I was unaware of this needing a PA but I have initiated the PA now:    PA Initiation    Medication: Wegovy 2.4MG/0.75ML  Insurance Company: [facility] - Phone [phone] Fax [phone]  Pharmacy Filling the Rx:    Filling Pharmacy Phone:    Filling Pharmacy Fax:    Start Date: 1/10/2023    [id number]       ABDOMINAL DISTENSION/PAIN/VOMITING/NAUSEA/DIARRHEA

## 2023-01-10 NOTE — TELEPHONE ENCOUNTER
Patient wife requesting a call back concerned about how long this process could take and the effects of patient not having the medication on time he was supposed to get this does on 1/8/2023. Please advise. Thank You

## 2023-01-11 NOTE — TELEPHONE ENCOUNTER
Per Wife Caridad  Requesting if we can  Please Call [phone] [facility] to expedite the request ?

## 2023-01-11 NOTE — TELEPHONE ENCOUNTER
I called the pt, he is in need of a refill. I informed we are waiting on a response from his insurance for the PA. I informed the pt he could see what the cost is out of pocket or try good RX in the interim.

## 2023-01-11 NOTE — TELEPHONE ENCOUNTER
"Hello,    I gave [facility] a call and they stated \"it was on the final step of being approved or denied\" but the representative did mj the PA as urgent but thinks by the time that it's seen, the result may already have been decided. Once I receive the PA response I will let you know.    Thank You!    Son Kelsey [facility] Pharmacy Liaison  ealth Zachary  [email or web address]  Phone: [phone]  Fax: [phone]  "

## 2023-01-12 NOTE — TELEPHONE ENCOUNTER
Prior Authorization Approval    Authorization Effective Date: 1/11/2023  Authorization Expiration Date: 1/11/2024  Medication: Wegovy 2.4MG/0.75ML  Approved Dose/Quantity: 28 day supply  Reference #: [id number]   Insurance Company: [employer] - Phone [phone] Fax [phone]  Expected CoPay:       CoPay Card Available:      Foundation Assistance Needed:    Which Pharmacy is filling the prescription (Not needed for infusion/clinic administered):    Pharmacy Notified:    Patient Notified:

## 2023-01-30 ENCOUNTER — ANCILLARY PROCEDURE (OUTPATIENT)
Dept: GENERAL RADIOLOGY | Facility: CLINIC | Age: 48
End: 2023-01-30
Attending: NURSE PRACTITIONER
Payer: COMMERCIAL

## 2023-01-30 ENCOUNTER — OFFICE VISIT (OUTPATIENT)
Dept: URGENT CARE | Facility: URGENT CARE | Age: 48
End: 2023-01-30
Payer: COMMERCIAL

## 2023-01-30 ENCOUNTER — NURSE TRIAGE (OUTPATIENT)
Dept: NURSING | Facility: CLINIC | Age: 48
End: 2023-01-30
Payer: COMMERCIAL

## 2023-01-30 VITALS
SYSTOLIC BLOOD PRESSURE: 157 MMHG | WEIGHT: 315 LBS | TEMPERATURE: 97.5 F | DIASTOLIC BLOOD PRESSURE: 95 MMHG | RESPIRATION RATE: 18 BRPM | HEART RATE: 70 BPM | OXYGEN SATURATION: 97 % | BODY MASS INDEX: 44.21 KG/M2

## 2023-01-30 DIAGNOSIS — M54.6 ACUTE BILATERAL THORACIC BACK PAIN: ICD-10-CM
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
## 2023-01-30 DIAGNOSIS — M53.3 PAIN IN THE COCCYX: ICD-10-CM

## 2023-01-30 PROBLEM — K21.9 GASTROESOPHAGEAL REFLUX DISEASE WITHOUT ESOPHAGITIS: Status: ACTIVE | Noted: 2020-01-15

## 2023-01-30 PROCEDURE — 72220 X-RAY EXAM SACRUM TAILBONE: CPT | Mod: TC | Performed by: RADIOLOGY

## 2023-01-30 PROCEDURE — 99213 OFFICE O/P EST LOW 20 MIN: CPT | Performed by: NURSE PRACTITIONER

## 2023-01-30 RX ORDER — CYCLOBENZAPRINE HCL 10 MG
10 TABLET ORAL 2 TIMES DAILY PRN
Qty: 10 TABLET | Refills: 0 | Status: SHIPPED | OUTPATIENT
Start: 2023-01-30 | End: 2023-02-22

## 2023-01-30 NOTE — PATIENT INSTRUCTIONS
Acute Back/Neck Pain: Self Care at Home Instructions  Conservative Treatment  Remaining active supports a quicker recovery, keep moving. (ex. Walking, increase time & speed as tolerated).  Bed rest is not recommended. Minimize sitting. Do not remain in one position for extended periods of time.  Maintain routine activity with attention to correct posture; stop aggravating activities.  Over-the-counter pain medications for short term symptom control. (See below)  Muscle relaxants are sometimes helpful for few days, but may cause drowsiness. (See below)  Cold packs or heat based upon your preference.  Most people improve within 2 weeks; most have significant improvement within 4 weeks.  If symptoms worsen or you experience numbness, contact your provider immediately.    Medications for Pain Relief  Recommended over-the-counter non-steroidal anti-inflammatories:     Ibuprofen (Advil, Motrin) 800 mg. three times a day as needed for pain      OR   Naproxen Sodium (Aleve) 220-440 mg. two times per day as needed for pain    If you have been prescribed a muscle relaxant (*cyclobenzapine 10 mg.)  Take    - 1 tablet at bedtime  *May cause drowsiness. Do not drive when taking this medication.

## 2023-01-30 NOTE — LETTER
[facility]  [address] [facility] [address]  Phone: [phone]    January 30, 2023        Norberto Doty  [address] [address]          To whom it may concern:    RE: Norberto Doty    Patient was seen and treated today at our clinic and missed work due to fall on ice with back pain will likely miss 3-5 days additional will need to follow up with primary care if symptoms persist.     Please contact me for questions or concerns.      Sincerely,        JUSTICE De La Torre CNP

## 2023-01-30 NOTE — TELEPHONE ENCOUNTER
"Triage call:    Patient calling to report worsening back pain.  He reports the pain radiates from the middle of his back to his lower back. The patient reports \"tweaking\" his back last Thursday while lifting & landing on his tailbone after a fall last Saturday, he denies hitting his head.  He reports when moving a certain way his back pain is a 10/10.  At time of call, the patient reports wearing a back brace & is sitting his chair, he rates his pain a 4/10.    Of note, he reports having a hx of a bulging disk.      The patient reports taking tylenol & using an ice pack, w/o any improvement in his back pain.  The patient reports being ambulatory & denies any numbness or tingling in his extremities.    Per protocol, patient should be seen in office today.  The patient reports there were no available appts today, so writer advised him to go to nearest [facility].  Patient agrees w/ disposition.    Avani Acevedo RN on 1/30/2023 at 9:20 AM      Reason for Disposition    SEVERE back pain (e.g., excruciating, unable to do any normal activities) and not improved after pain medicine and CARE ADVICE    Additional Information    Negative: Shock suspected (e.g., cold/pale/clammy skin, too weak to stand, low BP, rapid pulse)    Negative: Sounds like a life-threatening emergency to the triager    Negative: Passed out (i.e., fainted, collapsed and was not responding)    Negative: SEVERE back pain of sudden onset and age > 60 years    Negative: SEVERE abdominal pain (e.g., excruciating)    Negative: Abdominal pain and age > 60 years    Negative: Unable to urinate (or only a few drops) and bladder feels very full    Negative: Loss of bladder or bowel control (urine or bowel incontinence; wetting self, leaking stool) of new-onset    Negative: Numbness (loss of sensation) in groin or rectal area    Negative: Pain radiates into groin, scrotum    Negative: Weakness of a leg or foot (e.g., unable to bear weight, dragging foot)    Negative: " Vomiting and pain over lower ribs of back (i.e., flank - kidney area)    Negative: Blood in urine (red, pink, or tea-colored)    Negative: Patient sounds very sick or weak to the triager    Negative: Fever > 100.4 F (38.0 C) and flank pain    Negative: Pain or burning with passing urine (urination)    Protocols used: BACK PAIN-A-OH

## 2023-01-30 NOTE — PROGRESS NOTES
Assessment & Plan     1. Fall due to slipping on ice or snow, initial encounter    - XR Sacrum and Coccyx 2 Views  - cyclobenzaprine (FLEXERIL) 10 MG tablet; Take 1 tablet (10 mg) by mouth 2 times daily as needed for muscle spasms (1-2 tablets as needed)  Dispense: 10 tablet; Refill: 0    2. Pain in the coccyx    - XR Sacrum and Coccyx 2 Views  - cyclobenzaprine (FLEXERIL) 10 MG tablet; Take 1 tablet (10 mg) by mouth 2 times daily as needed for muscle spasms (1-2 tablets as needed)  Dispense: 10 tablet; Refill: 0    3. Acute bilateral thoracic back pain    - cyclobenzaprine (FLEXERIL) 10 MG tablet; Take 1 tablet (10 mg) by mouth 2 times daily as needed for muscle spasms (1-2 tablets as needed)  Dispense: 10 tablet; Refill: 0    Patient Instructions                    Acute Back/Neck Pain: Self Care at Home Instructions  Conservative Treatment    Remaining active supports a quicker recovery, keep moving. (ex. Walking, increase time & speed as tolerated).    Bed rest is not recommended. Minimize sitting. Do not remain in one position for extended periods of time.    Maintain routine activity with attention to correct posture; stop aggravating activities.    Over-the-counter pain medications for short term symptom control. (See below)    Muscle relaxants are sometimes helpful for few days, but may cause drowsiness. (See below)    Cold packs or heat based upon your preference.    Most people improve within 2 weeks; most have significant improvement within 4 weeks.    If symptoms worsen or you experience numbness, contact your provider immediately.    Medications for Pain Relief  Recommended over-the-counter non-steroidal anti-inflammatories:     Ibuprofen (Advil, Motrin) 800 mg. three times a day as needed for pain      OR   Naproxen Sodium (Aleve) 220-440 mg. two times per day as needed for pain    If you have been prescribed a muscle relaxant (*cyclobenzapine 10 mg.)  Take    - 1 tablet at bedtime  *May cause  drowsiness. Do not drive when taking this medication.                  Shannon Houston, JUSTICE CNP  [facility]    Bay Thornton is a 47 year old male who presents to clinic today for the following health issues:  Chief Complaint   Patient presents with     Back Pain     Tweaked his back on Thursday  Fell and landed on tailbone on Saturday     HPI    Patient states that he was at work the other day lifting heavy pallets then went home and was working in garage felt mid thoracic back pain states he has chronic back pain in this area.  He usually is able to rest and use heat and/or ice ibuprofen area will improve in a couple of days.  However a couple days later he fell on the ice falling on tailbone and now has having acute coccyx pain with walking sitting standing he has been using heat and ice ibuprofen as needed without relief.  Denies loss of bowel or bladder denies radiculopathy.      Review of Systems  Constitutional, HEENT, cardiovascular, pulmonary, gi and gu systems are negative, except as otherwise noted.      Objective    BP (!) 157/95   Pulse 70   Temp 97.5  F (36.4  C) (Tympanic)   Resp 18   Wt 147.9 kg (326 lb)   SpO2 97%   BMI 44.21 kg/m    Physical Exam   GENERAL: alert, no distress and obese  NECK: no adenopathy, no asymmetry, masses, or scars and thyroid normal to palpation  RESP: lungs clear to auscultation - no rales, rhonchi or wheezes  CV: regular rate and rhythm, normal S1 S2, no S3 or S4, no murmur, click or rub, no peripheral edema and peripheral pulses strong  ABDOMEN: soft, nontender, no hepatosplenomegaly, no masses and bowel sounds normal  MS: Patient experiencing thoracic lumbar pain with range of motion flexion extension and rotation.  Pain does not radiate.  Also has acute coccyx pain there is no indication of inflammation erythema or warmth.  Negative straight leg raises normal CMS and pulses are intact.  SKIN: no suspicious lesions or  gopi    Results for orders placed or performed in visit on 01/30/23   XR Sacrum and Coccyx 2 Views     Status: None    Narrative    SACRUM AND COCCYX TWO VIEWS  January 30, 2023 1:44 PM     HISTORY: Fall due to slipping on ice or snow, initial encounter. Pain  in the coccyx.    COMPARISON: None.      Impression    IMPRESSION: No convincing sacral or coccyx fracture by x-ray. Lower  lumbar spine degenerative changes.    ANA ALEJANDRA MD         SYSTEM ID:  [id number]

## 2023-02-09 NOTE — LETTER
6/28/2021         RE: Norberto Doty  [address] [address]        Dear Colleague,    Thank you for referring your patient, Norberto Doty, to the [facility] ENDOCRINOLOGY. Please see a copy of my visit note below.    Norberto is a 45 year old who is being evaluated via a billable video visit.      How would you like to obtain your AVS? MyChart  If the video visit is dropped, the invitation should be resent by:   Will anyone else be joining your video visit? No      Video Start Time: 4:13 PM  S: Pt being seen in f/u for DM.    Farxiga 5 mg every day  Metformin 1000 mg every day  trulicity 1.5 mg once a week  Tresiba recently increased to 50 U every day     Using Herminia.           Visit interrupted several times by poor signal.   Eventually he had to pull over so we could talk.     ROS: 10 point ROS neg other than the symptoms noted above in the HPI.    O:  GENERAL: Healthy, alert and no distress  EYES: Eyes grossly normal to inspection.  No discharge or erythema, or obvious scleral/conjunctival abnormalities.  RESP: No audible wheeze, cough, or visible cyanosis.  No visible retractions or increased work of breathing.    SKIN: Visible skin clear. No significant rash, abnormal pigmentation or lesions.  NEURO: Cranial nerves grossly intact.  Mentation and speech appropriate for age.  PSYCH: Mentation appears normal, affect normal/bright, judgement and insight intact, normal speech and appearance well-groomed.     A/P:   Type 2 DM - Uncontrolled. Discussed SGLT-2 inhibitors. Given recent worsening in HbA1C, we should rule out ADDIS first.   Labs from 10/2020 consistent with type 2 DM and farxiga ordered.   In 6/2021, up to 9.0%. Describing diarrhea with metformin. However, he wants to stick with it.   -Scan the Herminia at least every 8 hours.   -Referral to dietician.   -Continue metformin.   -No change to tresiba dose.   -Increase farxiga from 5 to 10 mg every day.   -Increase  Medical chart review    High Risk Lung - Lung RADS 3   LDCT 1/24/2023  Management: 6 month LDCT    Pt being followed by pulmonary medicine and seen on 1/30/23 who placed orders for follow up scan in 6 months   trulicity from 1.5 to 3.0 mg once a week.   -Labs in 3 months.   -ASA taking.  -BP: normal on last check.   -NAFL/DEMPSEY: Elevated ALT in 7/2020.   US from 2019 shows fatty liver. Discussed significance with patient.    ALT 87 in 6/2021.   -Lipids: Trg 230, HDL 40, LDL 44. On atorvastatin.               Trg 250, HDL 48, LDL 54.   -Microalbumin 31.48 in 7/2020. On lisinopril.    Repeat with next labs.   -Eyes: mild NPDR in 6/2021. Discussed significance.  -Smoking: none.      Fatty liver - as above.     Dyslipidemia - as above.       Jose De Jesus Mars M.D        Video-Visit Details    Type of service:  Video Visit    Video End Time:4:40 PM    Originating Location (pt. Location): Other Car    Distant Location (provider location):  [facility] ENDOCRINOLOGY     Platform used for Video Visit: Beata        Again, thank you for allowing me to participate in the care of your patient.        Sincerely,        Jose De Jesus Mars MD

## 2023-02-11 DIAGNOSIS — E11.65 TYPE 2 DIABETES MELLITUS WITH HYPERGLYCEMIA, WITHOUT LONG-TERM CURRENT USE OF INSULIN (H): ICD-10-CM

## 2023-02-12 RX ORDER — PROCHLORPERAZINE 25 MG/1
SUPPOSITORY RECTAL
Qty: 3 EACH | Refills: 0 | Status: SHIPPED | OUTPATIENT
Start: 2023-02-12 | End: 2023-03-27

## 2023-02-15 ENCOUNTER — LAB (OUTPATIENT)
Dept: LAB | Facility: CLINIC | Age: 48
End: 2023-02-15
Payer: COMMERCIAL

## 2023-02-15 DIAGNOSIS — Z11.59 NEED FOR HEPATITIS C SCREENING TEST: ICD-10-CM

## 2023-02-15 PROCEDURE — 36415 COLL VENOUS BLD VENIPUNCTURE: CPT

## 2023-02-15 PROCEDURE — 86803 HEPATITIS C AB TEST: CPT

## 2023-02-16 LAB — HCV AB SERPL QL IA: NONREACTIVE

## 2023-02-21 NOTE — PROGRESS NOTES
"      History of Present Illness       Back Pain:  He presents for follow up of back pain. Patient's back pain is a recurring problem.  Location of back pain:  Right middle of back, left middle of back and left shoulder  Description of back pain: dull ache  Back pain spreads: nowhere    Since patient first noticed back pain, pain is: always present, but gets better and worse  Does back pain interfere with his job:  Yes      Headaches:   Since the patient's last clinic visit, headaches are: no change  The patient is getting headaches:  Not often  He is not able to do normal daily activities when he has a migraine.  The patient is taking the following rescue/relief medications:  Ibuprofen (Advil, Motrin)   Patient states \"I get some relief\" from the rescue/relief medications.   The patient is taking the following medications to prevent migraines:  No medications to prevent migraines  In the past 4 weeks, the patient has gone to an Urgent Care or Emergency Room 0 times times due to headaches.    He eats 4 or more servings of fruits and vegetables daily.He consumes 0 sweetened beverage(s) daily.He exercises with enough effort to increase his heart rate 9 or less minutes per day.  He exercises with enough effort to increase his heart rate 3 or less days per week. He is missing 1 dose(s) of medications per week.       SUBJECTIVE:  Norberto Doty presents today for follow up of DIABETES MELLITUS.       Patient concerns: patient is not here for back but her for diabetes check    Patient glucose self monitorin to 140.  Symptoms of low blood sugar (hypoglycemia)? no  Problems taking medications regularly? y   Side effects? n  What are you doing for exercise outside of work or your daily activities? Not much  Reviewed health maintenance  Patient Active Problem List   Diagnosis     Punctate keratitis, bilateral     Uncontrolled diabetes mellitus type 2 without complications     Morbid obesity due to excess calories (H) "     Mixed hyperlipidemia     Essential hypertension     Type 2 diabetes mellitus (H)     DENISE (obstructive sleep apnea)     Gastroesophageal reflux disease without esophagitis       Smokes n  PHQ-2  Over the last two weeks- Have you been bothered by little interest or pleasure in doing things?  No  Over the last two weeks- Have you been been feeling down, depressed, or hopeless?  No    BP:   BP Readings from Last 3 Encounters:   02/22/23 130/70   01/30/23 (!) 157/95   08/24/22 137/79       Lab Results   Component Value Date    A1C 6.9 02/22/2023    A1C 6.5 08/02/2022    A1C 9.0 06/02/2021    A1C 7.1 10/22/2020    A1C 11.0 07/08/2020       Recent Labs   Lab Test 08/02/22  0838 06/02/21  1454   CHOL 127 154   HDL 46 48   LDL 61 54   TRIG 99 260*       Wt Readings from Last 3 Encounters:   02/22/23 145.6 kg (321 lb)   01/30/23 147.9 kg (326 lb)   08/24/22 146.5 kg (323 lb)     Asa is included in med list    Current Outpatient Medications   Medication Sig Dispense Refill     atorvastatin (LIPITOR) 40 MG tablet Take 1 tablet (40 mg) by mouth daily 90 tablet 3     Carboxymeth-Glycerin-Polysorb (REFRESH OPTIVE ADVANCED) 0.5-1-0.5 % SOLN Apply 1 drop to eye 2 times daily       Continuous Blood Gluc Sensor (DEXCOM G6 SENSOR) MISC USE ONE SENSOR EVERY 10 DAYS 3 each 0     Continuous Blood Gluc Transmit (DEXCOM G6 TRANSMITTER) MISC Change every 3 months. 1 each 3     dapagliflozin (FARXIGA) 10 MG TABS tablet Take 1 tablet (10 mg) by mouth daily 90 tablet 3     insulin degludec (TRESIBA FLEXTOUCH) 100 UNIT/ML pen New: 40 units in the am Old 45 mL 0     insulin pen needle (BD RAMILA U/F) 32G X 4 MM miscellaneous Use 1 daily as directed. 100 each 3     insulin pen needle (BD RAMILA U/F) 32G X 4 MM Use 1 daily as directed. 100 each 1     lisinopril (ZESTRIL) 2.5 MG tablet Take 1 tablet (2.5 mg) by mouth daily 90 tablet 3     omeprazole (PRILOSEC) 40 MG DR capsule Take 1 capsule (40 mg) by mouth daily 90 capsule 3     Semaglutide-Weight  Management 2.4 MG/0.75ML SOAJ Inject 2.4 mg Subcutaneous every 7 days 9 mL 4     tadalafil (CIALIS) 5 MG tablet TAKE 1 TABLET BY MOUTH EVERY 24 HOURS 90 tablet 3     ACCU-CHEK GUIDE test strip USE  THREE TIMES DAILY OR  AS  DIRECTED (Patient not taking: Reported on 2/22/2023) 300 strip 1     blood glucose monitoring (ACCU-CHEK FASTCLIX) lancets Use to test blood sugar 3 times daily or as directed.  Ok to substitute alternative if insurance prefers. (Patient not taking: Reported on 2/22/2023) 102 each 11     cyclobenzaprine (FLEXERIL) 10 MG tablet Take 1 tablet (10 mg) by mouth 2 times daily as needed for muscle spasms (1-2 tablets as needed) (Patient not taking: Reported on 2/22/2023) 10 tablet 0     dexamethasone (DECADRON) 2 MG tablet Take 1 tablet (2 mg) by mouth once for 1 dose Take at 11 PM, the next day go for an 8 AM blood draw 1 tablet 0       Histories reviewed and updated in Epic.        EXAM:  Vitals: /70   Pulse 85   Temp 97.9  F (36.6  C) (Tympanic)   Resp 20   Ht 1.829 m (6')   Wt 145.6 kg (321 lb)   SpO2 96%   BMI 43.54 kg/m    BMIE= Body mass index is 43.54 kg/m .  GENERAL APPEARANCE: alert and no acute distress  PSYCH: mentation appears normal and affect normal/bright  RESP: lungs clear to auscultation - no rales, rhonchi or wheezes  CV: regular rate and rhythm, normal S1 S2, no S3 or S4 and no murmur, click or rub -  EXT: no cyanosis or edema in lower extremities  SKIN: no venous stasis changes    ICD-10-CM    1. Type 2 diabetes mellitus without complication, without long-term current use of insulin (H)  E11.9 lisinopril (ZESTRIL) 2.5 MG tablet     Hemoglobin A1c     insulin degludec (TRESIBA FLEXTOUCH) 100 UNIT/ML pen      2. Gastroesophageal reflux disease with esophagitis, unspecified whether hemorrhage  K21.00 omeprazole (PRILOSEC) 40 MG DR capsule      3. Type 2 diabetes mellitus with both eyes affected by mild nonproliferative retinopathy without macular edema, with long-term  current use of insulin (H)  E11.3293     Z79.4       4. Class 3 severe obesity due to excess calories with serious comorbidity and body mass index (BMI) of 40.0 to 44.9 in adult (H)  E66.01     Z68.41        PLAN:Follow up in 6 months

## 2023-02-22 ENCOUNTER — OFFICE VISIT (OUTPATIENT)
Dept: FAMILY MEDICINE | Facility: CLINIC | Age: 48
End: 2023-02-22
Payer: COMMERCIAL

## 2023-02-22 ENCOUNTER — OFFICE VISIT (OUTPATIENT)
Dept: URGENT CARE | Facility: URGENT CARE | Age: 48
End: 2023-02-22
Payer: COMMERCIAL

## 2023-02-22 ENCOUNTER — ANCILLARY PROCEDURE (OUTPATIENT)
Dept: GENERAL RADIOLOGY | Facility: CLINIC | Age: 48
End: 2023-02-22
Attending: FAMILY MEDICINE
Payer: COMMERCIAL

## 2023-02-22 VITALS
RESPIRATION RATE: 18 BRPM | SYSTOLIC BLOOD PRESSURE: 130 MMHG | BODY MASS INDEX: 44.11 KG/M2 | TEMPERATURE: 98.1 F | DIASTOLIC BLOOD PRESSURE: 70 MMHG | WEIGHT: 315 LBS | HEART RATE: 85 BPM | OXYGEN SATURATION: 95 %

## 2023-02-22 VITALS
HEART RATE: 85 BPM | DIASTOLIC BLOOD PRESSURE: 70 MMHG | OXYGEN SATURATION: 96 % | BODY MASS INDEX: 42.66 KG/M2 | HEIGHT: 72 IN | SYSTOLIC BLOOD PRESSURE: 130 MMHG | WEIGHT: 315 LBS | TEMPERATURE: 97.9 F | RESPIRATION RATE: 20 BRPM

## 2023-02-22 DIAGNOSIS — Z79.4 TYPE 2 DIABETES MELLITUS WITH BOTH EYES AFFECTED BY MILD NONPROLIFERATIVE RETINOPATHY WITHOUT MACULAR EDEMA, WITH LONG-TERM CURRENT USE OF INSULIN (H): ICD-10-CM

## 2023-02-22 DIAGNOSIS — E66.813 CLASS 3 SEVERE OBESITY DUE TO EXCESS CALORIES WITH SERIOUS COMORBIDITY AND BODY MASS INDEX (BMI) OF 40.0 TO 44.9 IN ADULT (H): ICD-10-CM

## 2023-02-22 DIAGNOSIS — K21.00 GASTROESOPHAGEAL REFLUX DISEASE WITH ESOPHAGITIS, UNSPECIFIED WHETHER HEMORRHAGE: ICD-10-CM

## 2023-02-22 DIAGNOSIS — G89.29 CHRONIC MIDLINE BACK PAIN, UNSPECIFIED BACK LOCATION: ICD-10-CM

## 2023-02-22 DIAGNOSIS — M54.9 CHRONIC MIDLINE BACK PAIN, UNSPECIFIED BACK LOCATION: ICD-10-CM

## 2023-02-22 DIAGNOSIS — G89.29 CHRONIC MIDLINE BACK PAIN, UNSPECIFIED BACK LOCATION: Primary | ICD-10-CM

## 2023-02-22 DIAGNOSIS — E11.9 TYPE 2 DIABETES MELLITUS WITHOUT COMPLICATION, WITHOUT LONG-TERM CURRENT USE OF INSULIN (H): Chronic | ICD-10-CM

## 2023-02-22 DIAGNOSIS — M54.9 CHRONIC MIDLINE BACK PAIN, UNSPECIFIED BACK LOCATION: Primary | ICD-10-CM

## 2023-02-22 DIAGNOSIS — M54.12 CERVICAL RADICULOPATHY: ICD-10-CM

## 2023-02-22 DIAGNOSIS — E11.3293 TYPE 2 DIABETES MELLITUS WITH BOTH EYES AFFECTED BY MILD NONPROLIFERATIVE RETINOPATHY WITHOUT MACULAR EDEMA, WITH LONG-TERM CURRENT USE OF INSULIN (H): ICD-10-CM

## 2023-02-22 DIAGNOSIS — E66.01 CLASS 3 SEVERE OBESITY DUE TO EXCESS CALORIES WITH SERIOUS COMORBIDITY AND BODY MASS INDEX (BMI) OF 40.0 TO 44.9 IN ADULT (H): ICD-10-CM

## 2023-02-22 LAB — HBA1C MFR BLD: 6.9 % (ref 0–5.6)

## 2023-02-22 PROCEDURE — 36415 COLL VENOUS BLD VENIPUNCTURE: CPT | Performed by: FAMILY MEDICINE

## 2023-02-22 PROCEDURE — 99214 OFFICE O/P EST MOD 30 MIN: CPT | Performed by: FAMILY MEDICINE

## 2023-02-22 PROCEDURE — 0124A COVID-19 VACCINE BIVALENT BOOSTER 12+ (PFIZER): CPT | Performed by: FAMILY MEDICINE

## 2023-02-22 PROCEDURE — 72040 X-RAY EXAM NECK SPINE 2-3 VW: CPT | Mod: TC | Performed by: RADIOLOGY

## 2023-02-22 PROCEDURE — 72070 X-RAY EXAM THORAC SPINE 2VWS: CPT | Mod: TC | Performed by: RADIOLOGY

## 2023-02-22 PROCEDURE — 91312 COVID-19 VACCINE BIVALENT BOOSTER 12+ (PFIZER): CPT | Performed by: FAMILY MEDICINE

## 2023-02-22 PROCEDURE — 99214 OFFICE O/P EST MOD 30 MIN: CPT | Mod: 25 | Performed by: FAMILY MEDICINE

## 2023-02-22 PROCEDURE — 83036 HEMOGLOBIN GLYCOSYLATED A1C: CPT | Performed by: FAMILY MEDICINE

## 2023-02-22 PROCEDURE — 72100 X-RAY EXAM L-S SPINE 2/3 VWS: CPT | Mod: TC | Performed by: RADIOLOGY

## 2023-02-22 RX ORDER — LISINOPRIL 2.5 MG/1
2.5 TABLET ORAL DAILY
Qty: 90 TABLET | Refills: 3 | Status: SHIPPED | OUTPATIENT
Start: 2023-02-22 | End: 2024-05-17

## 2023-02-22 RX ORDER — CYCLOBENZAPRINE HCL 10 MG
10 TABLET ORAL 3 TIMES DAILY PRN
Qty: 45 TABLET | Refills: 0 | Status: SHIPPED | OUTPATIENT
Start: 2023-02-22

## 2023-02-22 RX ORDER — INSULIN DEGLUDEC 100 U/ML
INJECTION, SOLUTION SUBCUTANEOUS
Qty: 45 ML | Refills: 0 | Status: SHIPPED | OUTPATIENT
Start: 2023-02-22 | End: 2023-10-23

## 2023-02-22 RX ORDER — OMEPRAZOLE 40 MG/1
40 CAPSULE, DELAYED RELEASE ORAL DAILY
Qty: 90 CAPSULE | Refills: 3 | Status: SHIPPED | OUTPATIENT
Start: 2023-02-22 | End: 2024-04-04

## 2023-02-22 RX ORDER — METHYLPREDNISOLONE 4 MG
TABLET, DOSE PACK ORAL
Qty: 21 TABLET | Refills: 0 | Status: SHIPPED | OUTPATIENT
Start: 2023-02-22 | End: 2023-03-30

## 2023-02-22 ASSESSMENT — PAIN SCALES - GENERAL
PAINLEVEL: NO PAIN (0)
PAINLEVEL: SEVERE PAIN (6)

## 2023-02-22 NOTE — PROGRESS NOTES
Chief complaint: back pain    1999 had a fall and injured his back and has a history of buliging discs     Off and on would flare up    Jan 27 was at work and was moving some pallets and his back started hurting middle lower back.   Patient took easy   Laying on heating pad seemed to help  Jan 28 pushed and shovelled snow and a patch of ice and slipped and landed on his tailbone  Didn't hit his head but has been having some mild headaches since then   Patient came in to urgnent care Jan 30 xrays were negative and was prescribed with muscle relaxants. Seemed to help during the time    Patient took it easy and ice     Tailbone is now doing good  But back is still bothering him  Walking around and trying to do things at work not doing much better    Patient also having some chronic left posterior shoulder pain off and on since 1999  Also noticing it getting worse lately and to the point where he occasionally will get tingly in the left arm.    In the past went to the ER and had a heart rule out and everything was fine. And this is a new pain for him.    Sometimes the tingling is positional when he is driving and when he changes position gets better    Problem list, Medication list, Allergies, and Medical/Social/Surgical histories reviewed in [facility] and updated as appropriate.        REVIEW OF SYSTEMS  General: negative for fever, constitutional symptoms or weight loss  Resp: negative for chest pain or shortness of breath  CV: negative for chest pain  : negative for dysuria , incontinence, frequency  Musculoskeletal: as above  Neurologic: negative for ataxia, saddle anesthesia, fecal or urinary incontinence, one sided weakness,  Paresthesias  Constitutional, HEENT, cardiovascular, pulmonary, gi and gu systems are negative, except as otherwise noted.    Physical Exam:  Vitals: /70   Pulse 85   Temp 98.1  F (36.7  C) (Tympanic)   Resp 18   Wt 147.5 kg (325 lb 3.2 oz)   SpO2 95%   BMI 44.11 kg/m    BMI= Body  mass index is 44.11 kg/m .  Constitutional: healthy, alert and no acute distress   Head: atraumatic  CARDIO: regular in rate and rhythm no murmurs rubs or gallops  RESP: lungs clear to auscultation  ABDOMEN: soft nontender  NEURO: Patellar reflexes intact and equal b/l  BACK:  Straight leg raise intact, No spine tenderness  Positive bilateral thoracolumbar paraspinal muscle tenderness to palpation, strength intact and equal b/l lower extremities. Sensory intact. Rectal exam declined/deferred.   Musculoskeletal:  No cervical spine tenderness  Patient endorses posterior left and shoulder pain worse with certain mvts occasionally shooting down tingling on arm   No erythema warmth or swelling  AC joint tenderness: none  Bicipital groove tenderness: negative   Speed's test: negative   Cross arm test: negative   Active range of motion: negative   Passive range of motion: intact   Motor intact sensory intact.   GAIT: intact  Psychiatric: mentation appears normal and affect normal/bright      Impression:    ICD-10-CM    1. Chronic midline back pain, unspecified back location  M54.9 cyclobenzaprine (FLEXERIL) 10 MG tablet    G89.29 methylPREDNISolone (MEDROL DOSEPAK) 4 MG tablet therapy pack     XR Lumbar Spine 2/3 Views     XR Thoracic Spine 2 Views     Spine  Referral      2. Cervical radiculopathy  M54.12 cyclobenzaprine (FLEXERIL) 10 MG tablet     methylPREDNISolone (MEDROL DOSEPAK) 4 MG tablet therapy pack     XR Cervical Spine 2/3 Views     Spine  Referral            Plan:  xrays pending   Trial medrol dose pack and flexeril  Warned flexeril sedating. Sedating medications given. Aware not to drive or operate machinery while on these medications. Caution with .   Do not take with other sedating meds or alcohol  Warned medrol dose pack can increase blood sugars patient advised to monitor.   Referred to ortho if symptoms do not improve   Instructions for back care and return precautions  discussed.    back pain stretching excercises discussed. supportive treatment.  considery physical therapy if not better despite supportive treatment.  activity modifications advised.  Over the counter medications discussed. Patient aware to avoid NSAIDS if with any kidney disease or ulcers. Proper dosing of over the counter medications likewise discussed.  Adverse reaction to medication discussed.  aware to come in right away if with any fever or chills, worsening symptoms, headache, bowel or bladder incontinence, motor or sensory deficits or gait disturbances.   follow-up recommended.      Shoshana Sung MD

## 2023-03-19 ENCOUNTER — NURSE TRIAGE (OUTPATIENT)
Dept: NURSING | Facility: CLINIC | Age: 48
End: 2023-03-19
Payer: COMMERCIAL

## 2023-03-19 NOTE — TELEPHONE ENCOUNTER
Nurse Triage SBAR    Is this a 2nd Level Triage? NO    Situation: Patient calling reporting chest pain at the nipple line on his left side towards his arm pit    Background: started last evening  States history of left shoulder issues- uncertain if related    Assessment: Left sided chest pain at the nipple line- constant sensation- 2-3/10 - states has been constantly present for a couple hours now- states really noticed last night- at that time it was coming and going     Denies any radiating pain- states long standing issues with his left shoulder as well- patient is not sure if this has changed since having the pain in his chest   Denies additional symptoms   Cardiac risk factors: DMII, high cholesterol, hypertension      Protocol Recommended Disposition:   Call  Now    Recommendation: Advised on 911 but patient declines to do so- states he will go home and have his wife drive him to the ER today to be evaluated.      911 advised- patient declines but agrees to go to ER    Does the patient meet one of the following criteria for ADS visit consideration? 16+ years old, with an [facility] PCP     TIP  Providers, please consider if this condition is appropriate for management at one of our Acute and Diagnostic Services sites.     If patient is a good candidate, please use dotphrase <dot>triageresponse and select Refer to ADS to document.    Reason for Disposition    [1] Chest pain lasts > 5 minutes AND [2] age > 44    Additional Information    Negative: SEVERE difficulty breathing (e.g., struggling for each breath, speaks in single words)    Negative: Difficult to awaken or acting confused (e.g., disoriented, slurred speech)    Negative: Shock suspected (e.g., cold/pale/clammy skin, too weak to stand, low BP, rapid pulse)    Negative: Passed out (i.e., lost consciousness, collapsed and was not responding)    Protocols used: CHEST PAIN-A-AH

## 2023-03-24 ENCOUNTER — TELEPHONE (OUTPATIENT)
Dept: FAMILY MEDICINE | Facility: CLINIC | Age: 48
End: 2023-03-24
Payer: COMMERCIAL

## 2023-03-24 DIAGNOSIS — R07.9 CHEST PAIN, UNSPECIFIED TYPE: Primary | ICD-10-CM

## 2023-03-24 NOTE — TELEPHONE ENCOUNTER
See 3-19-23 triage note. Pt did go to ER and was worked up and advised to follow up with Cardiology.sx are improving and no new sx.  Advised if new or worsening sx go back to ER.      Pt has appointment to be seen by [facility] can you put in referral for them.  Current referral is for [facility].    Thalia DELANEYN, RN

## 2023-03-24 NOTE — TELEPHONE ENCOUNTER
Order/Referral Request    Who is requesting: Spouse, Caridad Doty [phone]    Orders being requested: referral    Reason service is needed/diagnosis: chest pain, tingling and numbness in left arm    When are orders needed by: ASAP    Has this been discussed with Provider: no    Does patient have a preference on a Group/Provider/Facility? [facility] [place] [facility]    Does patient have an appointment scheduled?: yes 3-28    Where to send orders: [phone] fax    Could we send this information to you in [facility] or would you prefer to receive a phone call?:      Call spouse or pt to schedule or inform them of referral made to [facility] [place] [facility]   [phone] phone   [phone] fax

## 2023-03-24 NOTE — TELEPHONE ENCOUNTER
A referral has been sent but I will ask RN to call patient to make sure he should not be see immediately by calling 911 or going to the ED

## 2023-03-27 ENCOUNTER — TELEPHONE (OUTPATIENT)
Dept: FAMILY MEDICINE | Facility: CLINIC | Age: 48
End: 2023-03-27
Payer: COMMERCIAL

## 2023-03-27 DIAGNOSIS — E11.65 TYPE 2 DIABETES MELLITUS WITH HYPERGLYCEMIA, WITHOUT LONG-TERM CURRENT USE OF INSULIN (H): ICD-10-CM

## 2023-03-27 DIAGNOSIS — R07.9 CHEST PAIN, UNSPECIFIED TYPE: Primary | ICD-10-CM

## 2023-03-27 RX ORDER — PROCHLORPERAZINE 25 MG/1
SUPPOSITORY RECTAL
Qty: 12 EACH | Refills: 0 | Status: SHIPPED | OUTPATIENT
Start: 2023-03-27 | End: 2023-05-16

## 2023-03-27 NOTE — TELEPHONE ENCOUNTER
Shoot, just noticed their note on this. I will call patient and ask what he wants to do.        Alexei Olea

## 2023-03-27 NOTE — TELEPHONE ENCOUNTER
Patient has a managed care plan and [facility] is out of his insurance network.  Could he be redirected back to [facility] for this?  Otherwise it needs to go to medical review to see if we can get an out of network approval if there is any specific reasoning we need to send out of network.    Thank you,   Hannah HOWELL [facility] Referrals Coordinator

## 2023-03-27 NOTE — TELEPHONE ENCOUNTER
Dl calling and pt is scheduled for a echo stress test tomorrow. They need a referral done to get it covered by insurance.      Can you do a referral?    Thalia DELANEYN, RN

## 2023-03-28 NOTE — TELEPHONE ENCOUNTER
Faxed to [facility] @ [phone].        RN's: Please obtain fax number and direct phone number next time if able, or at least clinic information. Thank you!!!

## 2023-03-30 ENCOUNTER — OFFICE VISIT (OUTPATIENT)
Dept: PHYSICAL MEDICINE AND REHAB | Facility: CLINIC | Age: 48
End: 2023-03-30
Payer: COMMERCIAL

## 2023-03-30 ENCOUNTER — HOSPITAL ENCOUNTER (OUTPATIENT)
Dept: GENERAL RADIOLOGY | Facility: HOSPITAL | Age: 48
Discharge: HOME | End: 2023-03-30
Attending: NURSE PRACTITIONER | Admitting: NURSE PRACTITIONER
Payer: COMMERCIAL

## 2023-03-30 VITALS
DIASTOLIC BLOOD PRESSURE: 79 MMHG | SYSTOLIC BLOOD PRESSURE: 141 MMHG | HEART RATE: 78 BPM | OXYGEN SATURATION: 98 % | WEIGHT: 315 LBS | HEIGHT: 72 IN | BODY MASS INDEX: 42.66 KG/M2

## 2023-03-30 DIAGNOSIS — M43.16 SPONDYLOLISTHESIS OF LUMBAR REGION: ICD-10-CM

## 2023-03-30 DIAGNOSIS — G89.29 CHRONIC MIDLINE LOW BACK PAIN WITHOUT SCIATICA: ICD-10-CM

## 2023-03-30 DIAGNOSIS — M25.512 CHRONIC LEFT SHOULDER PAIN: ICD-10-CM

## 2023-03-30 DIAGNOSIS — G89.29 CHRONIC LEFT SHOULDER PAIN: Primary | ICD-10-CM

## 2023-03-30 DIAGNOSIS — M25.512 CHRONIC LEFT SHOULDER PAIN: Primary | ICD-10-CM

## 2023-03-30 DIAGNOSIS — M79.18 CERVICAL MYOFASCIAL PAIN SYNDROME: ICD-10-CM

## 2023-03-30 DIAGNOSIS — M54.12 CERVICAL RADICULOPATHY: ICD-10-CM

## 2023-03-30 DIAGNOSIS — G89.29 CHRONIC LEFT SHOULDER PAIN: ICD-10-CM

## 2023-03-30 DIAGNOSIS — M54.50 CHRONIC MIDLINE LOW BACK PAIN WITHOUT SCIATICA: ICD-10-CM

## 2023-03-30 PROCEDURE — 99204 OFFICE O/P NEW MOD 45 MIN: CPT | Performed by: NURSE PRACTITIONER

## 2023-03-30 PROCEDURE — 73030 X-RAY EXAM OF SHOULDER: CPT | Mod: LT

## 2023-03-30 ASSESSMENT — PAIN SCALES - GENERAL: PAINLEVEL: MILD PAIN (2)

## 2023-03-30 NOTE — LETTER
3/30/2023         RE: Norberto Doty  [address] [address]        Dear Colleague,    Thank you for referring your patient, Norberot Doty, to the [facility]. Please see a copy of my visit note below.    ASSESSMENT: Norberto Doty is a 47 year old male who presents for consultation at the request of PCP [place], with a past medical history significant for morbid obesity, GERD, DENISE, hyperlipidemia, type 2 diabetes mellitus-uncontrolled, who presents today for new patient evaluation of:    -Chronic midline low back pain, flareup with recent fall 1 and half month ago however improvement with Medrol Dosepak.  No current radicular pain.    -Chronic left shoulder pain progressive, of unknown etiology today, and negative shoulder provocative maneuvers on exam however previous shoulder x-ray 2019 did show rotator cuff calcification/tendinitis.    -Cervical myofascial pain, cannot completely rule out cervical radiculopathy symptoms for left shoulder/arm pain.    Patient is neurologically intact on exam. No myelopathic or red flag symptoms.     No flowsheet data found.         Diagnoses and all orders for this visit:  Chronic left shoulder pain  -     XR Shoulder Left 2 Views; Future  -     Physical Therapy Referral; Future  Cervical radiculopathy  -     Spine  Referral  -     Physical Therapy Referral; Future  Cervical myofascial pain syndrome  -     Physical Therapy Referral; Future  Chronic midline low back pain without sciatica  -     Spine  Referral  -     Physical Therapy Referral; Future  Spondylolisthesis of lumbar region  -     Physical Therapy Referral; Future    PLAN:  Reviewed spine anatomy and disease process. Discussed diagnosis and treatment options with the patient today. A shared decision making model was used.  The patient's values and choices were respected. The following represents what was discussed and decided  upon by the provider and the patient.      -DIAGNOSTIC TESTS:  Images were personally reviewed and interpreted and explained to patient today using spine model.   -- Ordered left shoulder x-ray to further evaluate progressive left shoulder pain in comparison to prior 2019 x-ray.  -- Lumbar spine x-ray 2/22/2023 with mild retrolisthesis L5-S1 with facet arthropathy.  -- Thoracic spine x-ray 2/22/2023 with normal alignment and disc height.  -- Cervical spine x-ray 2/22/2023 with mild facet arthropathy.  Otherwise normal alignment.  -- Sacrum/coccyx x-ray post fall 1/30/2023 shows no evidence of fracture.  -- Left shoulder x-ray [facility] 2019 showed calcifications distal rotator cuff suspicious of calcific tendinitis.  Glenohumeral joint and acromioclavicular joint unremarkable.    -PHYSICAL THERAPY: Referral to physical therapy placed to address left arm/shoulder pain, thoracic pain and chronic low back pain.  Discussed the importance of core strengthening, ROM, stretching exercises with the patient and how each of these entities is important in decreasing pain.  Explained to the patient that the purpose of physical therapy is to teach the patient a home exercise program.  These exercises need to be performed every day in order to decrease pain and prevent future occurrences of pain.        -MEDICATIONS: No changes in medications at this time.  Discussed multiple medication options today with patient. Discussed risks, side effects, and proper use of medications. Patient verbalized understanding.    -INTERVENTIONS: Could consider injections, potential left shoulder injection down the road if symptoms or not improving with physical therapy pending updated x-ray versus cervical spine injections however would need MRIs prior to considering cervical injections.  Discussed risks and benefits of injections with patient today.    -PATIENT EDUCATION:  Total time of 46 minutes, on the day of service, spent with the patient,  reviewing the chart, placing orders, and documenting.   -Today we also discussed the issues related to the current COVID-19 pandemic, the pros and cons of the current treatment plan, the CDC guidelines such as social distancing, washing hands, masking, and covering the cough.    -FOLLOW-UP:   Follow-up [employer] message for x-ray results    Advised patient to call the Spine Center if symptoms worsen or you have problems controlling bladder and bowel function.   ______________________________________________________________________    SUBJECTIVE:  HPI:  Norberto Doty  Is a 47 year old male who presents today for new patient evaluation of low back pain midline lumbar spine that has been chronic ongoing since injury in the early 2000's, he did have a work comp injury at that time but the case was closed.  Patient reports that he had a fall on ice 1-1/2-month ago where his legs came out underneath him and he landed on his tailbone and did have acute tailbone pain and worsened low back pain at that time however had a Medrol Dosepak that had dramatically improved both his tailbone and low back pain and that is not his biggest concern today.  He otherwise denies radiating lower extremity pain.  Denies numbness or tingling sensations in his legs.  Patient does report that he has had chronic left shoulder pain that has been progressive in the last year that does radiate into the left upper extremity somewhat nonspecifically with intermittent numbness and tingling in the left arm.  He is right-hand dominant.  Denies upper extremity weakness.  Currently his shoulder pain he reports is a 2/10.    Denies any balance problems.  Denies bowel or bladder loss control, denies saddle anesthesia.    -Treatment to Date:  History of physical therapy years ago in [place] for LBP  Chiropractic treatments last in 2018 LBP.    -Medications:  Cyclobenzaprine  Medrol Dosepak prescribed 2/22/2023    Current Outpatient Medications    Medication     atorvastatin (LIPITOR) 40 MG tablet     Carboxymeth-Glycerin-Polysorb (REFRESH OPTIVE ADVANCED) 0.5-1-0.5 % SOLN     Continuous Blood Gluc Sensor (DEXCOM G6 SENSOR) MISC     Continuous Blood Gluc Transmit (DEXCOM G6 TRANSMITTER) MISC     cyclobenzaprine (FLEXERIL) 10 MG tablet     dapagliflozin (FARXIGA) 10 MG TABS tablet     insulin degludec (TRESIBA FLEXTOUCH) 100 UNIT/ML pen     insulin pen needle (BD RAMILA U/F) 32G X 4 MM miscellaneous     insulin pen needle (BD RAMILA U/F) 32G X 4 MM     lisinopril (ZESTRIL) 2.5 MG tablet     omeprazole (PRILOSEC) 40 MG DR capsule     Semaglutide-Weight Management 2.4 MG/0.75ML SOAJ     tadalafil (CIALIS) 5 MG tablet     No current facility-administered medications for this visit.       No Known Allergies    Past Medical History:   Diagnosis Date     Acute calculous cholecystitis 01/16/2019     Diabetes (H)      Hematuria 01/16/2019     Hypertension      Sepsis (H) 01/15/2019        Patient Active Problem List   Diagnosis     Punctate keratitis, bilateral     Uncontrolled diabetes mellitus type 2 without complications     Morbid obesity due to excess calories (H)     Mixed hyperlipidemia     Essential hypertension     Type 2 diabetes mellitus (H)     DENISE (obstructive sleep apnea)     Gastroesophageal reflux disease without esophagitis       Past Surgical History:   Procedure Laterality Date     NO HISTORY OF SURGERY         Family History   Problem Relation Age of Onset     Diabetes Maternal Aunt      Cancer No family hx of      Hypertension No family hx of      Cerebrovascular Disease No family hx of      Thyroid Disease No family hx of      Glaucoma No family hx of      Macular Degeneration No family hx of        Reviewed past medical, surgical, and family history with patient found on new patient intake packet located in EMR [employer] tab.     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  Does report drinking alcohol,  denies history being a heavy drinker.  Denies recreational drug use.    ROS: Positive for left arm pain, joint pain, shoulder pain, low back pain.  Specifically negative for bowel/bladder dysfunction, balance changes, headache, dizziness, foot drop, fevers, chills, appetite changes, nausea/vomiting, unexplained weight loss. Otherwise 13 systems reviewed are negative. Please see the patient's intake questionnaire from today for details.    OBJECTIVE:  BP (!) 141/79 (BP Location: Right arm, Patient Position: Sitting)   Pulse 78   Ht 6' (1.829 m)   Wt 328 lb (148.8 kg)   SpO2 98%   BMI 44.48 kg/m      PHYSICAL EXAMINATION:    --CONSTITUTIONAL:  Vital signs as above.  No acute distress.  The patient is well nourished and well groomed.  --PSYCHIATRIC:  Appropriate mood and affect. The patient is awake, alert, oriented to person, place, time and answering questions appropriately with clear speech.    --MUSCULOSKELETAL: Lumbar spine inspection reveals no evidence of deformity.  No tenderness to palpation over thoracic or lumbar spine.    CERVICAL:   --HEENT:  Sclera are non-injected.  Extraocular muscles are intact.  Thyroid moves easily upon swallowing.  Moist oral mucosa.  --SKIN:  Skin over the face, bilateral upper extremities, and posterior torso is clean, dry, intact without rashes.  --UPPER EXTREMITY MOTOR TESTING:  Wrist flexion left 5/5, right 5/5  Wrist extension left 5/5, right 5/5  Pronators left 5/5, right 5/5  Biceps left 5/5, right 5/5   Triceps left 5/5, right 5/5   Shoulder abduction left 5/5, right 5/5   left 5/5, right 5/5  --NEUROLOGIC:  CN III-XII are grossly intact.  Bilateral patellar and achilles reflexes are physiologic and symmetric. 2/4 symmetric biceps, brachioradialis, triceps reflexes bilaterally.  Sensation to upper extremities is intact.  Negative Payton's bilaterally.    --VASCULAR:  2/4 radial pulses bilaterally.  Warm upper limbs bilaterally.  Capillary refill in the upper  extremities is less than 1 second. No obvious lower extremity swelling or varicosities.   --CERVICAL SPINE: Inspection reveals no evidence of deformity. Range of motion is not limited in cervical flexion, extension, increased pain with left lateral head tilt. No tenderness to palpation. Spurlings maneuver is negative to radicular pain.    --SHOULDERS: Full range of motion active and passive without increased pain with either range of motion bilaterally. Negative empty can.      RESULTS: Prior medical records from [facility] Everywhere were reviewed today.    Imaging: Spine imaging was personally reviewed and interpreted today. The images were shown to the patient and the findings were explained using a spine model.      Cervical, thoracic, lumbar spine x-ray reviewed.             Again, thank you for allowing me to participate in the care of your patient.        Sincerely,        Sakshi Lopez, CNP

## 2023-03-30 NOTE — PROGRESS NOTES
ASSESSMENT: Norberto Doty is a 47 year old male who presents for consultation at the request of PCP [place], with a past medical history significant for morbid obesity, GERD, DENISE, hyperlipidemia, type 2 diabetes mellitus-uncontrolled, who presents today for new patient evaluation of:    -Chronic midline low back pain, flareup with recent fall 1 and half month ago however improvement with Medrol Dosepak.  No current radicular pain.    -Chronic left shoulder pain progressive, of unknown etiology today, and negative shoulder provocative maneuvers on exam however previous shoulder x-ray 2019 did show rotator cuff calcification/tendinitis.    -Cervical myofascial pain, cannot completely rule out cervical radiculopathy symptoms for left shoulder/arm pain.    Patient is neurologically intact on exam. No myelopathic or red flag symptoms.     No flowsheet data found.         Diagnoses and all orders for this visit:  Chronic left shoulder pain  -     XR Shoulder Left 2 Views; Future  -     Physical Therapy Referral; Future  Cervical radiculopathy  -     Spine  Referral  -     Physical Therapy Referral; Future  Cervical myofascial pain syndrome  -     Physical Therapy Referral; Future  Chronic midline low back pain without sciatica  -     Spine  Referral  -     Physical Therapy Referral; Future  Spondylolisthesis of lumbar region  -     Physical Therapy Referral; Future    PLAN:  Reviewed spine anatomy and disease process. Discussed diagnosis and treatment options with the patient today. A shared decision making model was used.  The patient's values and choices were respected. The following represents what was discussed and decided upon by the provider and the patient.      -DIAGNOSTIC TESTS:  Images were personally reviewed and interpreted and explained to patient today using spine model.   -- Ordered left shoulder x-ray to further evaluate progressive left shoulder pain in comparison to  prior 2019 x-ray.  -- Lumbar spine x-ray 2/22/2023 with mild retrolisthesis L5-S1 with facet arthropathy.  -- Thoracic spine x-ray 2/22/2023 with normal alignment and disc height.  -- Cervical spine x-ray 2/22/2023 with mild facet arthropathy.  Otherwise normal alignment.  -- Sacrum/coccyx x-ray post fall 1/30/2023 shows no evidence of fracture.  -- Left shoulder x-ray [facility] 2019 showed calcifications distal rotator cuff suspicious of calcific tendinitis.  Glenohumeral joint and acromioclavicular joint unremarkable.    -PHYSICAL THERAPY: Referral to physical therapy placed to address left arm/shoulder pain, thoracic pain and chronic low back pain.  Discussed the importance of core strengthening, ROM, stretching exercises with the patient and how each of these entities is important in decreasing pain.  Explained to the patient that the purpose of physical therapy is to teach the patient a home exercise program.  These exercises need to be performed every day in order to decrease pain and prevent future occurrences of pain.        -MEDICATIONS: No changes in medications at this time.  Discussed multiple medication options today with patient. Discussed risks, side effects, and proper use of medications. Patient verbalized understanding.    -INTERVENTIONS: Could consider injections, potential left shoulder injection down the road if symptoms or not improving with physical therapy pending updated x-ray versus cervical spine injections however would need MRIs prior to considering cervical injections.  Discussed risks and benefits of injections with patient today.    -PATIENT EDUCATION:  Total time of 46 minutes, on the day of service, spent with the patient, reviewing the chart, placing orders, and documenting.   -Today we also discussed the issues related to the current COVID-19 pandemic, the pros and cons of the current treatment plan, the CDC guidelines such as social distancing, washing hands, masking, and covering  the cough.    -FOLLOW-UP:   Follow-up [facility] message for x-ray results    Advised patient to call the [facility] if symptoms worsen or you have problems controlling bladder and bowel function.   ______________________________________________________________________    SUBJECTIVE:  HPI:  Norberto Doty  Is a 47 year old male who presents today for new patient evaluation of low back pain midline lumbar spine that has been chronic ongoing since injury in the early 2000's, he did have a work comp injury at that time but the case was closed.  Patient reports that he had a fall on ice 1-1/2-month ago where his legs came out underneath him and he landed on his tailbone and did have acute tailbone pain and worsened low back pain at that time however had a Medrol Dosepak that had dramatically improved both his tailbone and low back pain and that is not his biggest concern today.  He otherwise denies radiating lower extremity pain.  Denies numbness or tingling sensations in his legs.  Patient does report that he has had chronic left shoulder pain that has been progressive in the last year that does radiate into the left upper extremity somewhat nonspecifically with intermittent numbness and tingling in the left arm.  He is right-hand dominant.  Denies upper extremity weakness.  Currently his shoulder pain he reports is a 2/10.    Denies any balance problems.  Denies bowel or bladder loss control, denies saddle anesthesia.    -Treatment to Date:  History of physical therapy years ago in [place] for LBP  Chiropractic treatments last in 2018 LBP.    -Medications:  Cyclobenzaprine  Medrol Dosepak prescribed 2/22/2023    Current Outpatient Medications   Medication     atorvastatin (LIPITOR) 40 MG tablet     Carboxymeth-Glycerin-Polysorb (REFRESH OPTIVE ADVANCED) 0.5-1-0.5 % SOLN     Continuous Blood Gluc Sensor (DEXCOM G6 SENSOR) MISC     Continuous Blood Gluc Transmit (DEXCOM G6 TRANSMITTER) MISC     cyclobenzaprine  (FLEXERIL) 10 MG tablet     dapagliflozin (FARXIGA) 10 MG TABS tablet     insulin degludec (TRESIBA FLEXTOUCH) 100 UNIT/ML pen     insulin pen needle (BD RAMILA U/F) 32G X 4 MM miscellaneous     insulin pen needle (BD RAMILA U/F) 32G X 4 MM     lisinopril (ZESTRIL) 2.5 MG tablet     omeprazole (PRILOSEC) 40 MG DR capsule     Semaglutide-Weight Management 2.4 MG/0.75ML SOAJ     tadalafil (CIALIS) 5 MG tablet     No current facility-administered medications for this visit.       No Known Allergies    Past Medical History:   Diagnosis Date     Acute calculous cholecystitis 01/16/2019     Diabetes (H)      Hematuria 01/16/2019     Hypertension      Sepsis (H) 01/15/2019        Patient Active Problem List   Diagnosis     Punctate keratitis, bilateral     Uncontrolled diabetes mellitus type 2 without complications     Morbid obesity due to excess calories (H)     Mixed hyperlipidemia     Essential hypertension     Type 2 diabetes mellitus (H)     DENISE (obstructive sleep apnea)     Gastroesophageal reflux disease without esophagitis       Past Surgical History:   Procedure Laterality Date     NO HISTORY OF SURGERY         Family History   Problem Relation Age of Onset     Diabetes Maternal Aunt      Cancer No family hx of      Hypertension No family hx of      Cerebrovascular Disease No family hx of      Thyroid Disease No family hx of      Glaucoma No family hx of      Macular Degeneration No family hx of        Reviewed past medical, surgical, and family history with patient found on new patient intake packet located in EMR Media tab.     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  Does report drinking alcohol, denies history being a heavy drinker.  Denies recreational drug use.    ROS: Positive for left arm pain, joint pain, shoulder pain, low back pain.  Specifically negative for bowel/bladder dysfunction, balance changes, headache, dizziness, foot drop, fevers, chills, appetite  changes, nausea/vomiting, unexplained weight loss. Otherwise 13 systems reviewed are negative. Please see the patient's intake questionnaire from today for details.    OBJECTIVE:  BP (!) 141/79 (BP Location: Right arm, Patient Position: Sitting)   Pulse 78   Ht 6' (1.829 m)   Wt 328 lb (148.8 kg)   SpO2 98%   BMI 44.48 kg/m      PHYSICAL EXAMINATION:    --CONSTITUTIONAL:  Vital signs as above.  No acute distress.  The patient is well nourished and well groomed.  --PSYCHIATRIC:  Appropriate mood and affect. The patient is awake, alert, oriented to person, place, time and answering questions appropriately with clear speech.    --MUSCULOSKELETAL: Lumbar spine inspection reveals no evidence of deformity.  No tenderness to palpation over thoracic or lumbar spine.    CERVICAL:   --HEENT:  Sclera are non-injected.  Extraocular muscles are intact.  Thyroid moves easily upon swallowing.  Moist oral mucosa.  --SKIN:  Skin over the face, bilateral upper extremities, and posterior torso is clean, dry, intact without rashes.  --UPPER EXTREMITY MOTOR TESTING:  Wrist flexion left 5/5, right 5/5  Wrist extension left 5/5, right 5/5  Pronators left 5/5, right 5/5  Biceps left 5/5, right 5/5   Triceps left 5/5, right 5/5   Shoulder abduction left 5/5, right 5/5   left 5/5, right 5/5  --NEUROLOGIC:  CN III-XII are grossly intact.  Bilateral patellar and achilles reflexes are physiologic and symmetric. 2/4 symmetric biceps, brachioradialis, triceps reflexes bilaterally.  Sensation to upper extremities is intact.  Negative Payton's bilaterally.    --VASCULAR:  2/4 radial pulses bilaterally.  Warm upper limbs bilaterally.  Capillary refill in the upper extremities is less than 1 second. No obvious lower extremity swelling or varicosities.   --CERVICAL SPINE: Inspection reveals no evidence of deformity. Range of motion is not limited in cervical flexion, extension, increased pain with left lateral head tilt. No tenderness to  palpation. Spurlings maneuver is negative to radicular pain.    --SHOULDERS: Full range of motion active and passive without increased pain with either range of motion bilaterally. Negative empty can.      RESULTS: Prior medical records from [facility] were reviewed today.    Imaging: Spine imaging was personally reviewed and interpreted today. The images were shown to the patient and the findings were explained using a spine model.      Cervical, thoracic, lumbar spine x-ray reviewed.

## 2023-03-30 NOTE — PATIENT INSTRUCTIONS
~Please call our [facility] Nurse Navigation line [phone] with any questions or concerns about your treatment plan, if symptoms worsen and you would like to be seen urgently, or if you have problems controlling bladder and bowel function.  ~Please note that any My Chart messages may take multiple days for a response due to the high volume of patients seen in clinic.   Anything sent Thursday night or after will be answered the following week when able, as Sakshi Lopez CNP does not work in clinic on Fridays.        ~You have been referred for Physical Therapy to [facility]. They will call you to schedule an appointment.      Scheduling phone number is [phone] for [facility] [place], [place], or [place] location.  If you have not heard from the scheduling office within 2 business days, please call [phone] for ALL other locations.    Discussed the importance of core strengthening, ROM, stretching exercises and how each of these entities is important in decreasing pain and improving long term spine health.  The purpose of physical therapy is to teach you an individualized home exercise program.  These exercises need to be performed every day in order to decrease pain and prevent future occurrences of pain.           Imaging has been ordered. Radiology will call you to schedule. Please call below if you do not hear from them in the next couple of days.     [facility] Radiology Scheduling    Please call [phone] to schedule your image(s) (select option #1). There are 3 different locations, see below.     [facility]  [address] [place] [id number]    [facility] [place]  [address], [address]   [facility] [address]    [place] [id number] [place] [address]

## 2023-03-31 NOTE — TELEPHONE ENCOUNTER
Per below, patient was being called to see what he wants to do?  Do you know if he was notified this is out if his insurance network?

## 2023-04-08 ENCOUNTER — HEALTH MAINTENANCE LETTER (OUTPATIENT)
Age: 48
End: 2023-04-08

## 2023-04-10 ENCOUNTER — THERAPY VISIT (OUTPATIENT)
Dept: PHYSICAL THERAPY | Facility: CLINIC | Age: 48
End: 2023-04-10
Attending: NURSE PRACTITIONER
Payer: COMMERCIAL

## 2023-04-10 DIAGNOSIS — M43.16 SPONDYLOLISTHESIS OF LUMBAR REGION: ICD-10-CM

## 2023-04-10 DIAGNOSIS — M25.512 CHRONIC LEFT SHOULDER PAIN: ICD-10-CM

## 2023-04-10 DIAGNOSIS — G89.29 CHRONIC MIDLINE LOW BACK PAIN WITHOUT SCIATICA: ICD-10-CM

## 2023-04-10 DIAGNOSIS — M54.12 CERVICAL RADICULOPATHY: ICD-10-CM

## 2023-04-10 DIAGNOSIS — M79.18 CERVICAL MYOFASCIAL PAIN SYNDROME: ICD-10-CM

## 2023-04-10 DIAGNOSIS — M54.50 CHRONIC MIDLINE LOW BACK PAIN WITHOUT SCIATICA: ICD-10-CM

## 2023-04-10 DIAGNOSIS — G89.29 CHRONIC LEFT SHOULDER PAIN: ICD-10-CM

## 2023-04-10 PROCEDURE — 97110 THERAPEUTIC EXERCISES: CPT | Mod: GP | Performed by: PHYSICAL THERAPIST

## 2023-04-10 PROCEDURE — 97161 PT EVAL LOW COMPLEX 20 MIN: CPT | Mod: GP | Performed by: PHYSICAL THERAPIST

## 2023-04-10 NOTE — PROGRESS NOTES
Physical Therapy Initial Evaluation  Subjective:  The history is provided by the patient. No  was used.   Patient Health History  Norberto Doty being seen for L shoulder area pain with some tingling down into left arm. Prior history of left shoulder pain with injections.     Problem began: 10/10/2022 (off and on for 20 years - worsened since last Fall).   Problem occurred: Gradual onset and worsening slowly   Pain is reported as 4/10 on pain scale.  General health as reported by patient is good.  Health conditions: morbid obesity, GERD, DENISE, hyperlipidemia, type 2 diabetes mellitus-uncontrolled.   Red flags:  None as reported by patient.  Medical allergies: none.   Surgeries include:  Other. Other surgery history details: gall bladder.    Current medications:  Muscle relaxants, pain medication and high blood pressure medication.    Current occupation is [employer].   Primary job tasks include:  Prolonged sitting, prolonged standing, computer work, pushing/pulling, repetitive tasks, lifting/carrying and driving.                  Therapist Generated HPI Evaluation  Problem details: Off and on pain for many years. Pain down the arm has gotten worse in time. Pain is often worse in the morning when first up in the morning. No previous PT for the shoulder. He has a fairly active job that flares up shoulder when he overdoes it. Pain is gradually worsening. Only thing that seems to help with pain is advil and ibuprofen as well as resting arm.         Type of problem:  Left shoulder.    This is a chronic condition.  Condition occurred with:  Unknown cause.  Where condition occurred: for unknown reasons.  Patient reports pain:  Lateral.  Pain is described as aching and is intermittent.  Pain is worse in the A.M..  Since onset symptoms are gradually worsening.  Associated symptoms:  Tingling. Symptoms are exacerbated by lifting, using arm behind back, using arm overhead, using arm at shoulder  level and carrying (forward reaching)    Special tests included:  X-ray (Normal glenohumeral and acromioclavicular joint alignment. No fracture or bone lesion. Minimal calcific tendinopathy adjacent to the greater tuberosity.).  Previous treatment includes chiropractic (no previous PT for shoulder).   Restrictions due to condition include:  Working in normal job without restrictions.  Barriers include:  None as reported by patient.                        Objective:  Standing Alignment:    Cervical/Thoracic:  Forward head  Shoulder/UE:  Rounded shoulders                                  Cervical/Thoracic Evaluation    AROM:  AROM Cervical:    Flexion:            Normal  Extension:       Min loss  Rotation:         Left: min loss     Right: min loss  Side Bend:      Left: min loss     Right:  Normal      Headaches: none  Cervical Myotomes:      C3 (neck side bend): Right: 5  C4 (shrug):  Left: 5    Right: 5  C5 (Deltoid):  Left: 5    Right: 5  C6 (Biceps):  Left: 5    Right: 5  C7 (Triceps):  Left: 5    Right: 5  C8 (Thumb Ext): Left: 5    Right: 5  T1 (Intrinsics): Left: 5    Right: 5  DTR's:  not assessed        Neural Tension:    Left side:  Median positive.      Cervical Palpation:    Tenderness present at Left:    Upper Trap and Erector Spinae    Functional Tests:  Core strength and proprioception spine wnl: - Spurling's bilaterally.      Spinal Segmental Conclusions:  Not assessed due to time constraints               Shoulder Evaluation:  ROM:  AROM:  normal                              Pain: normal and pain free AROM; crepitus with ER and IR and some with extension    Strength:    Flexion: Left:5/5   Pain:    Right: 5/5     Pain:   Extension:  Left: 5/5    Pain:    Right: 5/5    Pain:  Abduction:  Left: 4/5  Pain:    Right: 5/5     Pain:  Adduction:  Left: 5/5    Pain:    Right: 5/5     Pain:  Internal Rotation:  Left:5-/5     Pain:    Right: 5/5     Pain:  External Rotation:   Left:4/5     Pain:   Right:5/5      Pain:        Elbow Flexion:  Left:5/5     Pain:    Right:5/5     Pain:  Elbow Extension:  Left:5/5     Pain:    Right:5/5     Pain:    Special Tests:  Special tests assessed shoulder: + median nerve tension test into L arm.  Left shoulder positive for the following special tests:  Impingement    Palpation:  normal (no focal tenderness in shoulder or scapular region)                                         General     ROS    Assessment/Plan:    Patient is a 47 year old male with left side shoulder complaints.    Patient has the following significant findings with corresponding treatment plan.                Diagnosis 1:  Left shoulder pain, chronic vs. Cervical radiculopathy  Pain -  hot/cold therapy, mechanical traction, manual therapy, self management, education, directional preference exercise and home program  Decreased ROM/flexibility - manual therapy, therapeutic exercise and home program  Decreased joint mobility - manual therapy, therapeutic exercise and home program  Decreased strength - therapeutic exercise, therapeutic activities and home program  Impaired muscle performance - neuro re-education and home program  Impaired posture - neuro re-education and home program    Therapy Evaluation Codes:   Cumulative Therapy Evaluation is: Low complexity.    Previous and current functional limitations:  (See Goal Flow Sheet for this information)    Short term and Long term goals: (See Goal Flow Sheet for this information)     Communication ability:  Patient appears to be able to clearly communicate and understand verbal and written communication and follow directions correctly.  Treatment Explanation - The following has been discussed with the patient:   RX ordered/plan of care  Anticipated outcomes  Possible risks and side effects  This patient would benefit from PT intervention to resume normal activities.   Rehab potential is good.    Frequency:  1 X week, once daily  Duration:  for 8 weeks  Discharge Plan:   Achieve all LTG.  Independent in home treatment program.  Reach maximal therapeutic benefit.    Please refer to the daily flowsheet for treatment today, total treatment time and time spent performing 1:1 timed codes.

## 2023-04-11 NOTE — TELEPHONE ENCOUNTER
OON request has been denied by Medical Review.  Please see Communication note in 3-27-23 Cardio Referral for reason.    Beatrice  [facility] Referrals  [facility]

## 2023-05-11 ENCOUNTER — TELEPHONE (OUTPATIENT)
Dept: FAMILY MEDICINE | Facility: CLINIC | Age: 48
End: 2023-05-11
Payer: COMMERCIAL

## 2023-05-11 NOTE — TELEPHONE ENCOUNTER
Patient Quality Outreach    Patient is due for the following:   Diabetes -  Eye Exam, Diabetic Follow-Up Visit and Foot Exam  Physical Preventive Adult Physical   BP recheck       Topic Date Due     Pneumococcal Vaccine (1 - PCV) Never done     Flu Vaccine (1) Never done       Next Steps:   Schedule a nurse only visit for immunizations, office visit for diabetes recheck, Adult Preventative    Type of outreach:    Sent [facility] message.      Questions for provider review:    None           STELLA ROMERO MA

## 2023-05-15 DIAGNOSIS — Z79.4 TYPE 2 DIABETES MELLITUS WITH HYPERGLYCEMIA, WITH LONG-TERM CURRENT USE OF INSULIN (H): ICD-10-CM

## 2023-05-15 DIAGNOSIS — E11.65 TYPE 2 DIABETES MELLITUS WITH HYPERGLYCEMIA, WITH LONG-TERM CURRENT USE OF INSULIN (H): ICD-10-CM

## 2023-05-16 DIAGNOSIS — E11.65 TYPE 2 DIABETES MELLITUS WITH HYPERGLYCEMIA, WITHOUT LONG-TERM CURRENT USE OF INSULIN (H): ICD-10-CM

## 2023-05-16 RX ORDER — PROCHLORPERAZINE 25 MG/1
SUPPOSITORY RECTAL
Qty: 12 EACH | Refills: 0 | Status: SHIPPED | OUTPATIENT
Start: 2023-05-16 | End: 2023-10-23

## 2023-05-16 RX ORDER — PROCHLORPERAZINE 25 MG/1
SUPPOSITORY RECTAL
Qty: 1 EACH | Refills: 0 | Status: SHIPPED | OUTPATIENT
Start: 2023-05-16 | End: 2023-10-23

## 2023-06-30 DIAGNOSIS — E11.65 TYPE 2 DIABETES MELLITUS WITH HYPERGLYCEMIA, WITH LONG-TERM CURRENT USE OF INSULIN (H): ICD-10-CM

## 2023-06-30 DIAGNOSIS — Z79.4 TYPE 2 DIABETES MELLITUS WITH HYPERGLYCEMIA, WITH LONG-TERM CURRENT USE OF INSULIN (H): ICD-10-CM

## 2023-07-01 RX ORDER — DAPAGLIFLOZIN 10 MG/1
TABLET, FILM COATED ORAL
Qty: 90 TABLET | Refills: 0 | Status: SHIPPED | OUTPATIENT
Start: 2023-07-01 | End: 2023-09-15

## 2023-09-03 ENCOUNTER — HEALTH MAINTENANCE LETTER (OUTPATIENT)
Age: 48
End: 2023-09-03

## 2023-09-15 DIAGNOSIS — Z79.4 TYPE 2 DIABETES MELLITUS WITH HYPERGLYCEMIA, WITH LONG-TERM CURRENT USE OF INSULIN (H): ICD-10-CM

## 2023-09-15 DIAGNOSIS — N52.9 ERECTILE DYSFUNCTION, UNSPECIFIED ERECTILE DYSFUNCTION TYPE: ICD-10-CM

## 2023-09-15 DIAGNOSIS — E11.65 TYPE 2 DIABETES MELLITUS WITH HYPERGLYCEMIA, WITH LONG-TERM CURRENT USE OF INSULIN (H): ICD-10-CM

## 2023-09-15 RX ORDER — TADALAFIL 5 MG/1
TABLET ORAL
Qty: 30 TABLET | Refills: 1 | Status: SHIPPED | OUTPATIENT
Start: 2023-09-15 | End: 2023-11-29

## 2023-09-15 RX ORDER — DAPAGLIFLOZIN 10 MG/1
TABLET, FILM COATED ORAL
Qty: 90 TABLET | Refills: 0 | Status: SHIPPED | OUTPATIENT
Start: 2023-09-15 | End: 2023-11-29

## 2023-10-19 ENCOUNTER — TELEPHONE (OUTPATIENT)
Dept: ENDOCRINOLOGY | Facility: CLINIC | Age: 48
End: 2023-10-19
Payer: COMMERCIAL

## 2023-10-19 NOTE — TELEPHONE ENCOUNTER
Pt needs follow up, pt should see PA or NP in between visits. Or have pcp manage.     Requested Prescriptions   Pending Prescriptions Disp Refills    WEGOVY 2.4 MG/0.75ML pen [Pharmacy Med Name: Wegovy 2.4 MG/0.75ML Subcutaneous Solution Auto-injector] 12 mL 0     Sig: INJECT 2.4 MG SUBCUTANEOUSLY EVERY 7 DAYS       There is no refill protocol information for this order

## 2023-10-23 DIAGNOSIS — Z79.4 TYPE 2 DIABETES MELLITUS WITH HYPERGLYCEMIA, WITH LONG-TERM CURRENT USE OF INSULIN (H): ICD-10-CM

## 2023-10-23 DIAGNOSIS — E11.65 TYPE 2 DIABETES MELLITUS WITH HYPERGLYCEMIA, WITH LONG-TERM CURRENT USE OF INSULIN (H): ICD-10-CM

## 2023-10-23 DIAGNOSIS — E11.9 TYPE 2 DIABETES MELLITUS WITHOUT COMPLICATION, WITHOUT LONG-TERM CURRENT USE OF INSULIN (H): Chronic | ICD-10-CM

## 2023-10-23 DIAGNOSIS — E11.65 TYPE 2 DIABETES MELLITUS WITH HYPERGLYCEMIA, WITHOUT LONG-TERM CURRENT USE OF INSULIN (H): ICD-10-CM

## 2023-10-23 RX ORDER — PROCHLORPERAZINE 25 MG/1
SUPPOSITORY RECTAL
Qty: 1 EACH | Refills: 0 | Status: SHIPPED | OUTPATIENT
Start: 2023-10-23 | End: 2023-11-29

## 2023-10-23 RX ORDER — PROCHLORPERAZINE 25 MG/1
SUPPOSITORY RECTAL
Qty: 1 EACH | Refills: 0 | Status: SHIPPED | OUTPATIENT
Start: 2023-10-23 | End: 2023-11-15

## 2023-10-23 RX ORDER — INSULIN DEGLUDEC 100 U/ML
INJECTION, SOLUTION SUBCUTANEOUS
Qty: 45 ML | Refills: 0 | Status: SHIPPED | OUTPATIENT
Start: 2023-10-23

## 2023-10-23 RX ORDER — DAPAGLIFLOZIN 10 MG/1
TABLET, FILM COATED ORAL
Qty: 90 TABLET | Refills: 0 | OUTPATIENT
Start: 2023-10-23

## 2023-10-23 RX ORDER — DAPAGLIFLOZIN 10 MG/1
10 TABLET, FILM COATED ORAL DAILY
Qty: 90 TABLET | Refills: 0 | OUTPATIENT
Start: 2023-10-23

## 2023-10-23 RX ORDER — PROCHLORPERAZINE 25 MG/1
SUPPOSITORY RECTAL
Qty: 12 EACH | Refills: 0 | Status: SHIPPED | OUTPATIENT
Start: 2023-10-23 | End: 2023-11-29

## 2023-10-23 NOTE — TELEPHONE ENCOUNTER
Called and spoke with patient. Appointment made for 11/15/23, tomorrow did not work for him. Please send refills to the pharmacy.Hilaria Alcantar [facility]

## 2023-10-23 NOTE — TELEPHONE ENCOUNTER
Patient needs to be seen.  I can see him tomorrow at 1:30.  If not I can see him by adding him on sometime in the next one month.  When appointment is made I can okay refill.

## 2023-10-24 RX ORDER — SEMAGLUTIDE 2.4 MG/.75ML
INJECTION, SOLUTION SUBCUTANEOUS
Qty: 12 ML | Refills: 0 | OUTPATIENT
Start: 2023-10-24

## 2023-11-15 ENCOUNTER — OFFICE VISIT (OUTPATIENT)
Dept: FAMILY MEDICINE | Facility: CLINIC | Age: 48
End: 2023-11-15
Payer: COMMERCIAL

## 2023-11-15 VITALS
WEIGHT: 313 LBS | BODY MASS INDEX: 42.39 KG/M2 | SYSTOLIC BLOOD PRESSURE: 132 MMHG | HEART RATE: 78 BPM | OXYGEN SATURATION: 95 % | HEIGHT: 72 IN | DIASTOLIC BLOOD PRESSURE: 82 MMHG | RESPIRATION RATE: 19 BRPM | TEMPERATURE: 97.5 F

## 2023-11-15 DIAGNOSIS — E11.9 TYPE 2 DIABETES MELLITUS WITHOUT COMPLICATION, WITHOUT LONG-TERM CURRENT USE OF INSULIN (H): Chronic | ICD-10-CM

## 2023-11-15 LAB — HBA1C MFR BLD: 6.8 % (ref 0–5.6)

## 2023-11-15 PROCEDURE — 36415 COLL VENOUS BLD VENIPUNCTURE: CPT | Performed by: FAMILY MEDICINE

## 2023-11-15 PROCEDURE — 83036 HEMOGLOBIN GLYCOSYLATED A1C: CPT | Performed by: FAMILY MEDICINE

## 2023-11-15 PROCEDURE — 80061 LIPID PANEL: CPT | Performed by: FAMILY MEDICINE

## 2023-11-15 PROCEDURE — 99214 OFFICE O/P EST MOD 30 MIN: CPT | Performed by: FAMILY MEDICINE

## 2023-11-15 PROCEDURE — 80053 COMPREHEN METABOLIC PANEL: CPT | Performed by: FAMILY MEDICINE

## 2023-11-15 PROCEDURE — 82570 ASSAY OF URINE CREATININE: CPT | Performed by: FAMILY MEDICINE

## 2023-11-15 PROCEDURE — 82043 UR ALBUMIN QUANTITATIVE: CPT | Performed by: FAMILY MEDICINE

## 2023-11-15 RX ORDER — ATORVASTATIN CALCIUM 40 MG/1
40 TABLET, FILM COATED ORAL DAILY
Qty: 90 TABLET | Refills: 3 | Status: SHIPPED | OUTPATIENT
Start: 2023-11-15

## 2023-11-15 ASSESSMENT — PAIN SCALES - GENERAL: PAINLEVEL: MODERATE PAIN (4)

## 2023-11-15 NOTE — PROGRESS NOTES
Subjective   Norberto is a 48 year old, presenting for the following health issues:  Diabetes        11/15/2023     8:34 AM   Additional Questions   Roomed by Vishnu ARMIJO LPN   Accompanied by Self         11/15/2023     8:34 AM   Patient Reported Additional Medications   Patient reports taking the following new medications None       History of Present Illness       Diabetes:   He presents for follow up of diabetes.  He is checking home blood glucose one time daily.   He checks blood glucose before and after meals.  Blood glucose is sometimes over 200 and never under 70. He is aware of hypoglycemia symptoms including none.    He has no concerns regarding his diabetes at this time.   He is not experiencing numbness or burning in feet, excessive thirst, blurry vision, weight changes or redness, sores or blisters on feet. The patient has not had a diabetic eye exam in the last 12 months.          He eats 0-1 servings of fruits and vegetables daily.He consumes 0 sweetened beverage(s) daily.He exercises with enough effort to increase his heart rate 9 or less minutes per day.  He exercises with enough effort to increase his heart rate 3 or less days per week. He is missing 1 dose(s) of medications per week.  He is not taking prescribed medications regularly due to remembering to take.   SUBJECTIVE:  Norberto Doty presents today for follow up of DIABETES MELLITUS.     Patient glucose self monitoring: with monitor  Blood glucose averages: 120  Symptoms of low blood sugar (hypoglycemia)? n  Problems taking medications regularly? y   Side effects? n  What are you doing for exercise outside of work or your daily activities? n  Reviewed health maintenance  Patient Active Problem List   Diagnosis    Punctate keratitis, bilateral    Uncontrolled diabetes mellitus type 2 without complications    Morbid obesity due to excess calories (H)    Mixed hyperlipidemia    Essential hypertension    Type 2 diabetes mellitus (H)    DENISE  (obstructive sleep apnea)    Gastroesophageal reflux disease without esophagitis    Cervical radiculopathy    Chronic left shoulder pain       Smokes n  PHQ-2  Over the last two weeks- Have you been bothered by little interest or pleasure in doing things?  No  Over the last two weeks- Have you been been feeling down, depressed, or hopeless?  Yes    BP:   BP Readings from Last 3 Encounters:   11/15/23 132/82   03/30/23 (!) 141/79   02/22/23 130/70       Lab Results   Component Value Date    A1C 6.8 11/15/2023    A1C 6.9 02/22/2023    A1C 6.5 08/02/2022    A1C 9.0 06/02/2021    A1C 7.1 10/22/2020    A1C 11.0 07/08/2020       Recent Labs   Lab Test 08/02/22  0838 06/02/21  1454   CHOL 127 154   HDL 46 48   LDL 61 54   TRIG 99 260*       Wt Readings from Last 3 Encounters:   11/15/23 142 kg (313 lb)   03/30/23 148.8 kg (328 lb)   02/22/23 147.5 kg (325 lb 3.2 oz)     Asa is included in med list    Current Outpatient Medications   Medication Sig Dispense Refill    atorvastatin (LIPITOR) 40 MG tablet Take 1 tablet (40 mg) by mouth daily 90 tablet 3    Carboxymeth-Glycerin-Polysorb (REFRESH OPTIVE ADVANCED) 0.5-1-0.5 % SOLN Apply 1 drop to eye 2 times daily      Continuous Blood Gluc Sensor (DEXCOM G6 SENSOR) MISC USE ONE SENSOR EVERY 10 DAYS 12 each 0    Continuous Blood Gluc Transmit (DEXCOM G6 TRANSMITTER) MISC Change every 3 months. 1 each 0    Continuous Blood Gluc Transmit (DEXCOM G6 TRANSMITTER) MISC CHANGE EVERY 3 MONTHS 1 each 0    cyclobenzaprine (FLEXERIL) 10 MG tablet Take 1 tablet (10 mg) by mouth 3 times daily as needed for muscle spasms 45 tablet 0    FARXIGA 10 MG TABS tablet Take 1 tablet by mouth once daily 90 tablet 0    insulin degludec (TRESIBA FLEXTOUCH) 100 UNIT/ML pen New: 40 units in the am Old 45 mL 0    insulin pen needle (BD RAMILA U/F) 32G X 4 MM miscellaneous Use 1 daily as directed. 100 each 3    insulin pen needle (BD RAMILA U/F) 32G X 4 MM Use 1 daily as directed. 100 each 1    lisinopril  "(ZESTRIL) 2.5 MG tablet Take 1 tablet (2.5 mg) by mouth daily 90 tablet 3    omeprazole (PRILOSEC) 40 MG DR capsule Take 1 capsule (40 mg) by mouth daily 90 capsule 3    Semaglutide-Weight Management (WEGOVY) 2.4 MG/0.75ML pen Inject 2.4 mg Subcutaneous every 7 days 9 mL 4    tadalafil (CIALIS) 5 MG tablet TAKE 1 TABLET BY MOUTH EVERY 24 HOURS 30 tablet 1       Histories reviewed and updated in Epic.    EXAM:  Vitals: /82   Pulse 78   Temp 97.5  F (36.4  C) (Tympanic)   Resp 19   Ht 1.816 m (5' 11.5\")   Wt 142 kg (313 lb)   SpO2 95%   BMI 43.05 kg/m    BMIE= Body mass index is 43.05 kg/m .  GENERAL APPEARANCE: alert and no acute distress  PSYCH: mentation appears normal and affect normal/bright  RESP: lungs clear to auscultation - no rales, rhonchi or wheezes  CV: regular rate and rhythm, normal S1 S2, no S3 or S4 and no murmur, click or rub -  EXT: no cyanosis or edema in lower extremities  SKIN: no venous stasis changes    ICD-10-CM    1. Type 2 diabetes mellitus without complication, without long-term current use of insulin (H)  E11.9 Hemoglobin A1c     Lipid panel reflex to direct LDL Fasting     Comprehensive metabolic panel (BMP + Alb, Alk Phos, ALT, AST, Total. Bili, TP)     atorvastatin (LIPITOR) 40 MG tablet     Albumin Random Urine Quantitative with Creat Ratio     Hemoglobin A1c     Lipid panel reflex to direct LDL Fasting     Comprehensive metabolic panel (BMP + Alb, Alk Phos, ALT, AST, Total. Bili, TP)     Albumin Random Urine Quantitative with Creat Ratio       PLAN:Follow up in 6 months       "

## 2023-11-16 LAB
ALBUMIN SERPL BCG-MCNC: 4.5 G/DL (ref 3.5–5.2)
ALP SERPL-CCNC: 68 U/L (ref 40–150)
ALT SERPL W P-5'-P-CCNC: 39 U/L (ref 0–70)
ANION GAP SERPL CALCULATED.3IONS-SCNC: 11 MMOL/L (ref 7–15)
AST SERPL W P-5'-P-CCNC: 26 U/L (ref 0–45)
BILIRUB SERPL-MCNC: 0.9 MG/DL
BUN SERPL-MCNC: 12.3 MG/DL (ref 6–20)
CALCIUM SERPL-MCNC: 9.4 MG/DL (ref 8.6–10)
CHLORIDE SERPL-SCNC: 102 MMOL/L (ref 98–107)
CHOLEST SERPL-MCNC: 127 MG/DL
CREAT SERPL-MCNC: 0.81 MG/DL (ref 0.67–1.17)
CREAT UR-MCNC: 58.4 MG/DL
DEPRECATED HCO3 PLAS-SCNC: 25 MMOL/L (ref 22–29)
EGFRCR SERPLBLD CKD-EPI 2021: >90 ML/MIN/1.73M2
GLUCOSE SERPL-MCNC: 129 MG/DL (ref 70–99)
HDLC SERPL-MCNC: 47 MG/DL
LDLC SERPL CALC-MCNC: 53 MG/DL
MICROALBUMIN UR-MCNC: <12 MG/L
MICROALBUMIN/CREAT UR: NORMAL MG/G{CREAT}
NONHDLC SERPL-MCNC: 80 MG/DL
POTASSIUM SERPL-SCNC: 4.3 MMOL/L (ref 3.4–5.3)
PROT SERPL-MCNC: 7.3 G/DL (ref 6.4–8.3)
SODIUM SERPL-SCNC: 138 MMOL/L (ref 135–145)
TRIGL SERPL-MCNC: 135 MG/DL

## 2023-11-29 DIAGNOSIS — Z79.4 TYPE 2 DIABETES MELLITUS WITH HYPERGLYCEMIA, WITH LONG-TERM CURRENT USE OF INSULIN (H): ICD-10-CM

## 2023-11-29 DIAGNOSIS — E11.65 TYPE 2 DIABETES MELLITUS WITH HYPERGLYCEMIA, WITH LONG-TERM CURRENT USE OF INSULIN (H): ICD-10-CM

## 2023-11-29 DIAGNOSIS — E11.65 TYPE 2 DIABETES MELLITUS WITH HYPERGLYCEMIA, WITHOUT LONG-TERM CURRENT USE OF INSULIN (H): ICD-10-CM

## 2023-11-29 DIAGNOSIS — N52.9 ERECTILE DYSFUNCTION, UNSPECIFIED ERECTILE DYSFUNCTION TYPE: ICD-10-CM

## 2023-11-29 RX ORDER — DAPAGLIFLOZIN 10 MG/1
10 TABLET, FILM COATED ORAL DAILY
Qty: 90 TABLET | Refills: 1 | Status: SHIPPED | OUTPATIENT
Start: 2023-11-29 | End: 2024-05-07

## 2023-11-29 RX ORDER — PROCHLORPERAZINE 25 MG/1
SUPPOSITORY RECTAL
Qty: 9 EACH | Refills: 1 | Status: SHIPPED | OUTPATIENT
Start: 2023-11-29 | End: 2024-06-03

## 2023-11-29 RX ORDER — TADALAFIL 5 MG/1
TABLET ORAL
Qty: 30 TABLET | Refills: 5 | Status: SHIPPED | OUTPATIENT
Start: 2023-11-29 | End: 2024-06-03

## 2023-11-29 RX ORDER — TADALAFIL 5 MG/1
TABLET ORAL
Qty: 30 TABLET | Refills: 1 | Status: CANCELLED | OUTPATIENT
Start: 2023-11-29

## 2023-11-29 RX ORDER — PROCHLORPERAZINE 25 MG/1
SUPPOSITORY RECTAL
Qty: 1 EACH | Refills: 1 | Status: SHIPPED | OUTPATIENT
Start: 2023-11-29

## 2023-11-29 NOTE — TELEPHONE ENCOUNTER
Pt wife calling.  Pt had a visit with primary on 11/15/23 and refills were not sent on all medications.    Advised statin was sent for one year; one refill remains on lisinopril and omeprazole.    Farxiga, tadalafil and transmitters approved per [facility] Refill Protocol.    Sensors to provider; six months pended.    Shahnaz BLOOM RN  [facility]

## 2023-12-22 NOTE — PROGRESS NOTES
Physical Therapy Discharge Note     Norberto Doty was seen for 1 physical therapy visit and did not return.   Current status is unknown.   This episode for therapy has been closed.

## 2023-12-27 ENCOUNTER — TELEPHONE (OUTPATIENT)
Dept: FAMILY MEDICINE | Facility: CLINIC | Age: 48
End: 2023-12-27
Payer: COMMERCIAL

## 2023-12-27 NOTE — TELEPHONE ENCOUNTER
PA Initiation    Medication: WEGOVY 2.4 MG/0.75ML SC SOAJ  Insurance Company: [employer]Specialty PA's - Phone [phone] Fax [phone]  Pharmacy Filling the Rx:    Filling Pharmacy Phone:    Filling Pharmacy Fax:    Start Date: 12/27/2023    Key: [id number]

## 2023-12-28 NOTE — TELEPHONE ENCOUNTER
Prior Authorization Approval    Medication: WEGOVY 2.4 MG/0.75ML SC SOAJ  Authorization Effective Date: 12/27/2023  Authorization Expiration Date: 12/27/2024  Approved Dose/Quantity: 3ml per 28 days  Reference #: Key: [id number]   Insurance Company: [employer]Specialty [employer]'s - Phone [phone] Fax [phone]  Expected CoPay: $    CoPay Card Available:      Financial Assistance Needed: no  Which Pharmacy is filling the prescription: [facility] [address] [place] [address]  Pharmacy Notified: yes  Patient Notified: no

## 2024-01-17 ENCOUNTER — TELEPHONE (OUTPATIENT)
Dept: FAMILY MEDICINE | Facility: CLINIC | Age: 49
End: 2024-01-17
Payer: COMMERCIAL

## 2024-01-17 NOTE — TELEPHONE ENCOUNTER
Patient Quality Outreach    Patient is due for the following:   Diabetes -  Eye Exam, Diabetic Follow-Up Visit, and Foot Exam  Physical Preventive Adult Physical      Topic Date Due    Pneumococcal Vaccine (1 of 2 - PCV) Never done    Flu Vaccine (1) Never done    COVID-19 Vaccine (5 - 2023-24 season) 09/01/2023       Next Steps:   Schedule a Adult Preventative    Type of outreach:    Sent Wire message.      Questions for provider review:    None           STELLA ROMERO MA

## 2024-01-19 ENCOUNTER — OFFICE VISIT (OUTPATIENT)
Dept: FAMILY MEDICINE | Facility: CLINIC | Age: 49
End: 2024-01-19
Payer: COMMERCIAL

## 2024-01-19 VITALS
HEIGHT: 72 IN | OXYGEN SATURATION: 96 % | SYSTOLIC BLOOD PRESSURE: 149 MMHG | WEIGHT: 315 LBS | RESPIRATION RATE: 16 BRPM | BODY MASS INDEX: 42.66 KG/M2 | TEMPERATURE: 96.1 F | DIASTOLIC BLOOD PRESSURE: 95 MMHG | HEART RATE: 83 BPM

## 2024-01-19 DIAGNOSIS — H10.9 CONJUNCTIVITIS OF RIGHT EYE, UNSPECIFIED CONJUNCTIVITIS TYPE: Primary | ICD-10-CM

## 2024-01-19 DIAGNOSIS — M77.11 LATERAL EPICONDYLITIS OF RIGHT ELBOW: ICD-10-CM

## 2024-01-19 PROCEDURE — 90480 ADMN SARSCOV2 VAC 1/ONLY CMP: CPT | Performed by: PHYSICIAN ASSISTANT

## 2024-01-19 PROCEDURE — 91320 SARSCV2 VAC 30MCG TRS-SUC IM: CPT | Performed by: PHYSICIAN ASSISTANT

## 2024-01-19 PROCEDURE — 99214 OFFICE O/P EST MOD 30 MIN: CPT | Performed by: PHYSICIAN ASSISTANT

## 2024-01-19 RX ORDER — OFLOXACIN 3 MG/ML
1-2 SOLUTION/ DROPS OPHTHALMIC 4 TIMES DAILY
Qty: 10 ML | Refills: 0 | Status: SHIPPED | OUTPATIENT
Start: 2024-01-19 | End: 2024-07-12

## 2024-01-19 ASSESSMENT — PAIN SCALES - GENERAL: PAINLEVEL: MODERATE PAIN (5)

## 2024-01-19 NOTE — PROGRESS NOTES
Assessment & Plan       ICD-10-CM    1. Conjunctivitis of right eye, unspecified conjunctivitis type  H10.9 ofloxacin (OCUFLOX) 0.3 % ophthalmic solution      2. Lateral epicondylitis of right elbow  M77.11       Good hand washing  warning signs discussed.   side effects discussed  Follow up  1 wk as needed   Avoid contact until better. If symptoms persist follow up  with eye    2. See Patient Instructions-we talked about activity modification along with ice and heat Tylenol and ibuprofen as needed.  If symptoms persist we will refer him to physical therapy.  We talked about over-the-counter elbow straps.  Recheck 4 wks as needed       Subjective   Norberto is a 48 year old, presenting for the following health issues:  Pain    History of Present Illness       Reason for visit:  Elbow & right eye  Symptom onset:  More than a month  Symptoms include:  Elbow pain, eye feels like something in there  Symptom intensity:  Moderate  Symptom progression:  Staying the same  Had these symptoms before:  No  What makes it worse:  Elbow: moving, lifting  eye steady pain  What makes it better:  Ice helped on elbow. eyedrops helped a little    He eats 2-3 servings of fruits and vegetables daily.He consumes 0 sweetened beverage(s) daily.He exercises with enough effort to increase his heart rate 9 or less minutes per day.  He exercises with enough effort to increase his heart rate 3 or less days per week. He is missing 2 dose(s) of medications per week.   Right elbow pain about 4 wks. Stared when he was doing Jack hammer work. Pain with range of motion and gripping activities.   Right Hand Dominate.   No numbness/tingling   No previous problems.     Right eye feels like there is something in eye x 1 day. Feels like something in eye. Wears contacts.   No recent illness.       Review of Systems  Constitutional, HEENT, cardiovascular, pulmonary, gi and gu systems are negative, except as otherwise noted.      Objective    BP (!) 149/95   " Pulse 83   Temp (!) 96.1  F (35.6  C) (Tympanic)   Resp 16   Ht 1.816 m (5' 11.5\")   Wt 144.2 kg (318 lb)   SpO2 96%   BMI 43.73 kg/m    Body mass index is 43.73 kg/m .  Physical Exam   GENERAL: healthy, alert and no distress  Head: Normocephalic, atraumatic.  Eyes: Conjunctiva: Right: Erythematous with discharge , non icteric. PERRLA.  Ears: External ears and TMs normal BL.  Nose: Septum midline, nasal mucosa pink and moist. No discharge.  Mouth / Throat: Normal dentition.  No oral lesions. Pharynx non erythematous, tonsils without hypertrophy.  Neck: Supple, no enlarged LN, trachea midline.   RESP: lungs clear to auscultation - no rales, rhonchi or wheezes  CV: regular rate and rhythm, normal S1 S2, no S3 or S4, no murmur, click or rub, no peripheral edema and peripheral pulses strong  Examination of his right elbow he has tenderness to the lateral epicondyle muscular attachment region.  He has pain with resisted wrist extension.  He has full range of motion.  No other tenderness noted.  He has negative ulnar and carpal tunnel Tinel's test.  He has 5-5  strength.  Neuro vas intact distally.        Signed Electronically by: Tahir Hilton PA-C    "

## 2024-04-03 DIAGNOSIS — K21.00 GASTROESOPHAGEAL REFLUX DISEASE WITH ESOPHAGITIS, UNSPECIFIED WHETHER HEMORRHAGE: ICD-10-CM

## 2024-04-04 RX ORDER — OMEPRAZOLE 40 MG/1
40 CAPSULE, DELAYED RELEASE ORAL DAILY
Qty: 90 CAPSULE | Refills: 0 | Status: SHIPPED | OUTPATIENT
Start: 2024-04-04 | End: 2024-05-07

## 2024-05-06 DIAGNOSIS — K21.00 GASTROESOPHAGEAL REFLUX DISEASE WITH ESOPHAGITIS, UNSPECIFIED WHETHER HEMORRHAGE: ICD-10-CM

## 2024-05-06 DIAGNOSIS — E11.65 TYPE 2 DIABETES MELLITUS WITH HYPERGLYCEMIA, WITH LONG-TERM CURRENT USE OF INSULIN (H): ICD-10-CM

## 2024-05-06 DIAGNOSIS — Z79.4 TYPE 2 DIABETES MELLITUS WITH HYPERGLYCEMIA, WITH LONG-TERM CURRENT USE OF INSULIN (H): ICD-10-CM

## 2024-05-07 RX ORDER — OMEPRAZOLE 40 MG/1
40 CAPSULE, DELAYED RELEASE ORAL DAILY
Qty: 30 CAPSULE | Refills: 0 | Status: SHIPPED | OUTPATIENT
Start: 2024-05-07 | End: 2024-06-03

## 2024-05-07 RX ORDER — DAPAGLIFLOZIN 10 MG/1
10 TABLET, FILM COATED ORAL DAILY
Qty: 30 TABLET | Refills: 0 | Status: SHIPPED | OUTPATIENT
Start: 2024-05-07 | End: 2024-06-03

## 2024-05-07 NOTE — TELEPHONE ENCOUNTER
Needs to be seen in the next month by me. May add to the last half of a 30 minute appointment. Do not send letter but call to make the appointment.

## 2024-05-17 DIAGNOSIS — E11.9 TYPE 2 DIABETES MELLITUS WITHOUT COMPLICATION, WITHOUT LONG-TERM CURRENT USE OF INSULIN (H): Chronic | ICD-10-CM

## 2024-05-17 RX ORDER — LISINOPRIL 2.5 MG/1
2.5 TABLET ORAL DAILY
Qty: 90 TABLET | Refills: 0 | Status: SHIPPED | OUTPATIENT
Start: 2024-05-17 | End: 2024-06-03

## 2024-05-17 NOTE — TELEPHONE ENCOUNTER
Called and spoke to patient, appointment made for 6/5/24.Please send refill to the pharmacy.Hilaria Alcantar [facility]

## 2024-06-05 ENCOUNTER — OFFICE VISIT (OUTPATIENT)
Dept: FAMILY MEDICINE | Facility: CLINIC | Age: 49
End: 2024-06-05
Payer: COMMERCIAL

## 2024-06-05 VITALS
WEIGHT: 307.4 LBS | RESPIRATION RATE: 20 BRPM | DIASTOLIC BLOOD PRESSURE: 84 MMHG | HEIGHT: 71 IN | OXYGEN SATURATION: 97 % | BODY MASS INDEX: 43.03 KG/M2 | TEMPERATURE: 97.3 F | HEART RATE: 77 BPM | SYSTOLIC BLOOD PRESSURE: 129 MMHG

## 2024-06-05 DIAGNOSIS — E11.65 TYPE 2 DIABETES MELLITUS WITH HYPERGLYCEMIA, WITHOUT LONG-TERM CURRENT USE OF INSULIN (H): ICD-10-CM

## 2024-06-05 DIAGNOSIS — Z79.4 TYPE 2 DIABETES MELLITUS WITH HYPERGLYCEMIA, WITH LONG-TERM CURRENT USE OF INSULIN (H): ICD-10-CM

## 2024-06-05 DIAGNOSIS — E11.9 TYPE 2 DIABETES MELLITUS WITHOUT COMPLICATION, WITHOUT LONG-TERM CURRENT USE OF INSULIN (H): Chronic | ICD-10-CM

## 2024-06-05 DIAGNOSIS — E11.65 TYPE 2 DIABETES MELLITUS WITH HYPERGLYCEMIA, WITH LONG-TERM CURRENT USE OF INSULIN (H): ICD-10-CM

## 2024-06-05 DIAGNOSIS — N52.9 ERECTILE DYSFUNCTION, UNSPECIFIED ERECTILE DYSFUNCTION TYPE: Primary | ICD-10-CM

## 2024-06-05 DIAGNOSIS — K21.00 GASTROESOPHAGEAL REFLUX DISEASE WITH ESOPHAGITIS, UNSPECIFIED WHETHER HEMORRHAGE: ICD-10-CM

## 2024-06-05 LAB — HBA1C MFR BLD: 7.2 % (ref 0–5.6)

## 2024-06-05 PROCEDURE — 99214 OFFICE O/P EST MOD 30 MIN: CPT | Mod: 25 | Performed by: FAMILY MEDICINE

## 2024-06-05 PROCEDURE — 36415 COLL VENOUS BLD VENIPUNCTURE: CPT | Performed by: FAMILY MEDICINE

## 2024-06-05 PROCEDURE — 90677 PCV20 VACCINE IM: CPT | Performed by: FAMILY MEDICINE

## 2024-06-05 PROCEDURE — 90471 IMMUNIZATION ADMIN: CPT | Performed by: FAMILY MEDICINE

## 2024-06-05 PROCEDURE — 83036 HEMOGLOBIN GLYCOSYLATED A1C: CPT | Performed by: FAMILY MEDICINE

## 2024-06-05 RX ORDER — OMEPRAZOLE 40 MG/1
40 CAPSULE, DELAYED RELEASE ORAL DAILY
Qty: 90 CAPSULE | Refills: 3 | Status: SHIPPED | OUTPATIENT
Start: 2024-06-05

## 2024-06-05 RX ORDER — PROCHLORPERAZINE 25 MG/1
SUPPOSITORY RECTAL
Qty: 9 EACH | Refills: 1 | Status: SHIPPED | OUTPATIENT
Start: 2024-06-05

## 2024-06-05 RX ORDER — DAPAGLIFLOZIN 10 MG/1
10 TABLET, FILM COATED ORAL DAILY
Qty: 90 TABLET | Refills: 1 | Status: SHIPPED | OUTPATIENT
Start: 2024-06-05

## 2024-06-05 RX ORDER — PEN NEEDLE, DIABETIC 32GX 5/32"
NEEDLE, DISPOSABLE MISCELLANEOUS
Qty: 100 EACH | Refills: 1 | Status: CANCELLED | OUTPATIENT
Start: 2024-06-05

## 2024-06-05 RX ORDER — PROCHLORPERAZINE 25 MG/1
SUPPOSITORY RECTAL
Qty: 1 EACH | Refills: 1 | Status: CANCELLED | OUTPATIENT
Start: 2024-06-05

## 2024-06-05 RX ORDER — LISINOPRIL 2.5 MG/1
2.5 TABLET ORAL DAILY
Qty: 90 TABLET | Refills: 3 | Status: SHIPPED | OUTPATIENT
Start: 2024-06-05

## 2024-06-05 RX ORDER — TADALAFIL 5 MG/1
TABLET ORAL
Qty: 30 TABLET | Refills: 5 | Status: SHIPPED | OUTPATIENT
Start: 2024-06-05

## 2024-06-05 ASSESSMENT — PAIN SCALES - GENERAL: PAINLEVEL: NO PAIN (0)

## 2024-06-05 NOTE — PROGRESS NOTES
Subjective   Norberto is a 48 year old, presenting for the following health issues:  Diabetes        6/5/2024    10:25 AM   Additional Questions   Roomed by Nazanin   Accompanied by ALMA     History of Present Illness       Diabetes:   He presents for follow up of diabetes.  He is checking home blood glucose one time daily.   He checks blood glucose after meals.  Blood glucose is sometimes over 200 and never under 70.  When his blood glucose is low, the patient is asymptomatic for confusion, blurred vision, lethargy and reports not feeling dizzy, shaky, or weak.   He has no concerns regarding his diabetes at this time.   He is not experiencing numbness or burning in feet, excessive thirst, blurry vision, weight changes or redness, sores or blisters on feet. The patient has not had a diabetic eye exam in the last 12 months.          He eats 2-3 servings of fruits and vegetables daily.He consumes 1 sweetened beverage(s) daily.He exercises with enough effort to increase his heart rate 9 or less minutes per day.  He exercises with enough effort to increase his heart rate 3 or less days per week. He is missing 2 dose(s) of medications per week.   SUBJECTIVE:  Norberto Doty presents today for follow up of DIABETES MELLITUS and blood pressure .     Reviewed health maintenance  Patient Active Problem List   Diagnosis    Punctate keratitis, bilateral    Uncontrolled diabetes mellitus type 2 without complications    Morbid obesity due to excess calories (H)    Mixed hyperlipidemia    Essential hypertension    Type 2 diabetes mellitus (H)    DENISE (obstructive sleep apnea)    Gastroesophageal reflux disease without esophagitis    Cervical radiculopathy    Chronic left shoulder pain       Smokes no      BP:   BP Readings from Last 3 Encounters:   06/05/24 129/84   01/19/24 (!) 149/95   11/15/23 132/82       Lab Results   Component Value Date    A1C 7.2 06/05/2024    A1C 6.8 11/15/2023    A1C 6.9 02/22/2023    A1C 9.0 06/02/2021  "   A1C 7.1 10/22/2020    A1C 11.0 07/08/2020       Recent Labs   Lab Test 11/15/23  1153 08/02/22  0838   CHOL 127 127   HDL 47 46   LDL 53 61   TRIG 135 99       Wt Readings from Last 3 Encounters:   06/05/24 139.4 kg (307 lb 6.4 oz)   01/19/24 144.2 kg (318 lb)   11/15/23 142 kg (313 lb)     Asa is included in med list    Current Outpatient Medications   Medication Sig Dispense Refill    atorvastatin (LIPITOR) 40 MG tablet Take 1 tablet (40 mg) by mouth daily 90 tablet 3    Carboxymeth-Glycerin-Polysorb (REFRESH OPTIVE ADVANCED) 0.5-1-0.5 % SOLN Apply 1 drop to eye 2 times daily      Continuous Blood Gluc Transmit (DEXCOM G6 TRANSMITTER) MISC Change every 3 months. 1 each 1    Continuous Glucose Sensor (DEXCOM G6 SENSOR) MISC USE ONE SENSOR EVERY 10 DAYS 9 each 1    FARXIGA 10 MG TABS tablet Take 1 tablet (10 mg) by mouth daily 90 tablet 1    insulin degludec (TRESIBA FLEXTOUCH) 100 UNIT/ML pen New: 40 units in the am Old 45 mL 0    insulin pen needle (BD RAMILA U/F) 32G X 4 MM miscellaneous Use 1 daily as directed. 100 each 3    insulin pen needle (BD RAMILA U/F) 32G X 4 MM Use 1 daily as directed. 100 each 1    lisinopril (ZESTRIL) 2.5 MG tablet Take 1 tablet (2.5 mg) by mouth daily 90 tablet 3    omeprazole (PRILOSEC) 40 MG DR capsule Take 1 capsule (40 mg) by mouth daily 90 capsule 3    Semaglutide-Weight Management (WEGOVY) 2.4 MG/0.75ML pen Inject 2.4 mg Subcutaneous every 7 days 9 mL 4    tadalafil (CIALIS) 5 MG tablet TAKE 1 TABLET BY MOUTH EVERY 24 HOURS 30 tablet 5    cyclobenzaprine (FLEXERIL) 10 MG tablet Take 1 tablet (10 mg) by mouth 3 times daily as needed for muscle spasms (Patient not taking: Reported on 6/5/2024) 45 tablet 0       Histories reviewed and updated in Epic.  EXAM:  Vitals: /84   Pulse 77   Temp 97.3  F (36.3  C) (Tympanic)   Resp 20   Ht 1.816 m (5' 11.5\")   Wt 139.4 kg (307 lb 6.4 oz)   SpO2 97%   BMI 42.28 kg/m    BMIE= Body mass index is 42.28 kg/m .  GENERAL APPEARANCE: " alert and no acute distress  PSYCH: mentation appears normal and affect normal/bright  RESP: lungs clear to auscultation - no rales, rhonchi or wheezes  CV: regular rate and rhythm, normal S1 S2, no S3 or S4 and no murmur, click or rub -  EXT: no cyanosis or edema in lower extremities  SKIN: no venous stasis changes    ICD-10-CM    1. Type 2 diabetes mellitus (H)  E11.9       2. Erectile dysfunction, unspecified erectile dysfunction type  N52.9 tadalafil (CIALIS) 5 MG tablet      3. Type 2 diabetes mellitus without complication, without long-term current use of insulin (H)  E11.9 Semaglutide-Weight Management (WEGOVY) 2.4 MG/0.75ML pen     lisinopril (ZESTRIL) 2.5 MG tablet     Hemoglobin A1c     Hemoglobin A1c      4. Type 2 diabetes mellitus with hyperglycemia, without long-term current use of insulin (H)  E11.65 Continuous Glucose Sensor (DEXCOM G6 SENSOR) MISC      5. Type 2 diabetes mellitus with hyperglycemia, with long-term current use of insulin (H)  E11.65 FARXIGA 10 MG TABS tablet    Z79.4       6. Gastroesophageal reflux disease with esophagitis, unspecified whether hemorrhage  K21.00 omeprazole (PRILOSEC) 40 MG DR capsule       PLAN:Follow up in 6 months           Prior to immunization administration, verified patients identity using patient s name and date of birth. Please see Immunization Activity for additional information.     Screening Questionnaire for Adult Immunization    Are you sick today?   No   Do you have allergies to medications, food, a vaccine component or latex?   No   Have you ever had a serious reaction after receiving a vaccination?   No   Do you have a long-term health problem with heart, lung, kidney, or metabolic disease (e.g., diabetes), asthma, a blood disorder, no spleen, complement component deficiency, a cochlear implant, or a spinal fluid leak?  Are you on long-term aspirin therapy?   No   Do you have cancer, leukemia, HIV/AIDS, or any other immune system problem?   No   Do  you have a parent, brother, or sister with an immune system problem?   No   In the past 3 months, have you taken medications that affect  your immune system, such as prednisone, other steroids, or anticancer drugs; drugs for the treatment of rheumatoid arthritis, Crohn s disease, or psoriasis; or have you had radiation treatments?   No   Have you had a seizure, or a brain or other nervous system problem?   No   During the past year, have you received a transfusion of blood or blood    products, or been given immune (gamma) globulin or antiviral drug?   No   For women: Are you pregnant or is there a chance you could become       pregnant during the next month?   No   Have you received any vaccinations in the past 4 weeks?   No     Immunization questionnaire answers were all negative.      Patient instructed to remain in clinic for 15 minutes afterwards, and to report any adverse reactions.     Screening performed by Nazanin Miramontes CMA on 6/5/2024 at 11:21 AM.       Signed Electronically by: [place] MD

## 2024-06-09 ENCOUNTER — HEALTH MAINTENANCE LETTER (OUTPATIENT)
Age: 49
End: 2024-06-09

## 2024-06-14 ENCOUNTER — OFFICE VISIT (OUTPATIENT)
Dept: OPTOMETRY | Facility: CLINIC | Age: 49
End: 2024-06-14
Payer: COMMERCIAL

## 2024-06-14 DIAGNOSIS — H52.13 MYOPIA, BILATERAL: ICD-10-CM

## 2024-06-14 DIAGNOSIS — H25.13 AGE-RELATED NUCLEAR CATARACT OF BOTH EYES: ICD-10-CM

## 2024-06-14 DIAGNOSIS — E11.65 TYPE 2 DIABETES MELLITUS WITH HYPERGLYCEMIA, WITHOUT LONG-TERM CURRENT USE OF INSULIN (H): Primary | ICD-10-CM

## 2024-06-14 DIAGNOSIS — H52.223 REGULAR ASTIGMATISM OF BOTH EYES: ICD-10-CM

## 2024-06-14 DIAGNOSIS — H52.4 PRESBYOPIA: ICD-10-CM

## 2024-06-14 PROCEDURE — 99214 OFFICE O/P EST MOD 30 MIN: CPT | Performed by: OPTOMETRIST

## 2024-06-14 PROCEDURE — 92015 DETERMINE REFRACTIVE STATE: CPT | Performed by: OPTOMETRIST

## 2024-06-14 ASSESSMENT — VISUAL ACUITY
OD_PH_CC+: -2
METHOD: SNELLEN - LINEAR
OD_CC: 20/50
OD_CC+: +2
OS_CC: 20/25
CORRECTION_TYPE: CONTACTS
OS_CC+: +2
OD_CC: 20/200
OS_CC: 20/30
OD_PH_CC: 20/40

## 2024-06-14 ASSESSMENT — REFRACTION_MANIFEST
OD_AXIS: 022
OD_SPHERE: -12.00
OD_CYLINDER: +2.00
OS_CYLINDER: +1.25
METHOD_AUTOREFRACTION: 1
OS_AXIS: 125
OD_ADD: +2.50
OS_SPHERE: -12.75
OS_ADD: +2.50

## 2024-06-14 ASSESSMENT — SLIT LAMP EXAM - LIDS
COMMENTS: 2+ PTOSIS
COMMENTS: 1+ PTOSIS

## 2024-06-14 ASSESSMENT — TONOMETRY
OS_IOP_MMHG: 12
OD_IOP_MMHG: 14
IOP_METHOD: APPLANATION

## 2024-06-14 ASSESSMENT — CONF VISUAL FIELD
OD_INFERIOR_TEMPORAL_RESTRICTION: 0
OD_SUPERIOR_TEMPORAL_RESTRICTION: 0
OD_SUPERIOR_NASAL_RESTRICTION: 0
OD_INFERIOR_NASAL_RESTRICTION: 0
OD_NORMAL: 1

## 2024-06-14 ASSESSMENT — CUP TO DISC RATIO
OS_RATIO: 0.2
OD_RATIO: 0.2

## 2024-06-14 ASSESSMENT — EXTERNAL EXAM - LEFT EYE: OS_EXAM: NORMAL

## 2024-06-14 ASSESSMENT — EXTERNAL EXAM - RIGHT EYE: OD_EXAM: NORMAL

## 2024-06-14 NOTE — PATIENT INSTRUCTIONS
Patient Education     REFER for OCT for possible maculopathy (BVA 20/30)  Diabetes weakens the blood vessels all over the body, including the eyes. Damage to the blood vessels in the eyes can cause swelling or bleeding into part of the eye (called the retina). This is called diabetic retinopathy (CRISTINO-tin-AH-puh-thee). If not treated, this disease can cause vision loss or blindness.   Symptoms may include blurred or distorted vision, but many people have no symptoms. It's important to see your eye doctor regularly to check for problems.   Early treatment and good control can help protect your vision. Here are the things you can do to help prevent vision loss:      1. Keep your blood sugar levels under tight control.      2. Bring high blood pressure under control.      3. No smoking.      4. Have yearly dilated eye exams.       Presbyopia is the diagnosis. Presbyopia is an age-related condition where the eye's crystalline lens doesn't change shape as easily as it once did.    The affects of the dilating drops last for 4- 6 hours.  You will be more sensitive to light and vision will be blurry up close.  Do not drive if you do not feel comfortable.  Mydriatic sunglasses were given if needed.      Eyeglass prescription given.     Chrissy Canseco O.D.  [facility]  [address] [place] [address] [phone]

## 2024-06-14 NOTE — LETTER
6/14/2024      Norberto Doty  [address] [facility] [address]      Dear Colleague,    Thank you for referring your patient, Norberto Doty, to the [facility]. Please see a copy of my visit note below.    Chief Complaint   Patient presents with     Diabetic Eye Exam        Chief Complaint(s) and History of Present Illness(es)       Diabetic Eye Exam              Diabetes Type: Type 2, on insulin and taking oral medications                   Lab Results   Component Value Date    A1C 7.2 06/05/2024    A1C 6.8 11/15/2023    A1C 6.9 02/22/2023    A1C 6.5 08/02/2022    A1C 9.0 06/02/2021    A1C 7.1 10/22/2020    A1C 11.0 07/08/2020    A1C 6.9 01/08/2020    A1C 6.2 07/02/2019            Last Eye Exam: 2022  Dilated Previously: Yes    What are you currently using to see?  contacts    Distance Vision Acuity: Satisfied with vision    Near Vision Acuity: Satisfied with vision while reading and using computer     Eye Comfort: good  Do you use eye drops? : Yes: Blink or B&L  Occupation or Hobbies: unemployed    Favian Steen - Optometric Assistant     Medical, surgical and family histories reviewed and updated 6/14/2024.       OBJECTIVE: See Ophthalmology exam    ASSESSMENT:  No diagnosis found.    PLAN:    Norberto Doty aware  eye exam results will be sent to [place].  There are no Patient Instructions on file for this visit.           Again, thank you for allowing me to participate in the care of your patient.        Sincerely,        Chrissy Canseco OD

## 2024-06-14 NOTE — PROGRESS NOTES
Chief Complaint   Patient presents with    Diabetic Eye Exam        Chief Complaint(s) and History of Present Illness(es)       Diabetic Eye Exam              Diabetes Type: Type 2, on insulin and taking oral medications                   Lab Results   Component Value Date    A1C 7.2 06/05/2024    A1C 6.8 11/15/2023    A1C 6.9 02/22/2023    A1C 6.5 08/02/2022    A1C 9.0 06/02/2021    A1C 7.1 10/22/2020    A1C 11.0 07/08/2020    A1C 6.9 01/08/2020    A1C 6.2 07/02/2019            Last Eye Exam: 2022  Dilated Previously: Yes    What are you currently using to see?  contacts    Distance Vision Acuity: Satisfied with vision    Near Vision Acuity: Satisfied with vision while reading and using computer     Eye Comfort: good  Do you use eye drops? : Yes: Blink or B&L  Occupation or Hobbies: unemployed    Favian Steen - Optometric Assistant     Medical, surgical and family histories reviewed and updated 6/14/2024.       OBJECTIVE: See Ophthalmology exam    ASSESSMENT:  No diagnosis found.    PLAN:    Norberto Doty aware  eye exam results will be sent to [place].  There are no Patient Instructions on file for this visit.

## 2024-06-27 DIAGNOSIS — H10.9 CONJUNCTIVITIS OF RIGHT EYE, UNSPECIFIED CONJUNCTIVITIS TYPE: ICD-10-CM

## 2024-07-09 ENCOUNTER — TELEPHONE (OUTPATIENT)
Dept: OPTOMETRY | Facility: CLINIC | Age: 49
End: 2024-07-09
Payer: COMMERCIAL

## 2024-07-09 NOTE — CONFIDENTIAL NOTE
Attempted to return patients call - line rang for a couple minutes but there was no voicemail prompt. I was unable to leave voicemail.

## 2024-07-09 NOTE — TELEPHONE ENCOUNTER
[facility] Call Center    Phone Message    May a detailed message be left on voicemail: yes     Reason for Call: Symptoms or Concerns     If patient has red-flag symptoms, warm transfer to triage line    Current symptom or concern: Possible infection- mucus/haze over eye.    Symptoms have been present for: Since 6/26/24.    Has patient previously been seen for this? No    By : NA     Date: NA     Are there any new or worsening symptoms? Yes: Patient would like to further discuss with care team. He believes he has an eye infection. Has been using leftover eye drops. Please call to advise. Thanks.     Action Taken: Other: eye    Travel Screening: Not Applicable

## 2024-07-12 ENCOUNTER — TELEPHONE (OUTPATIENT)
Dept: OPTOMETRY | Facility: CLINIC | Age: 49
End: 2024-07-12

## 2024-07-12 ENCOUNTER — E-VISIT (OUTPATIENT)
Dept: URGENT CARE | Facility: CLINIC | Age: 49
End: 2024-07-12
Payer: COMMERCIAL

## 2024-07-12 ENCOUNTER — TELEPHONE (OUTPATIENT)
Dept: FAMILY MEDICINE | Facility: CLINIC | Age: 49
End: 2024-07-12
Payer: COMMERCIAL

## 2024-07-12 DIAGNOSIS — H57.89 REDNESS OF EYE, RIGHT: Primary | ICD-10-CM

## 2024-07-12 PROCEDURE — 99207 PR NON-BILLABLE SERV PER CHARTING: CPT | Performed by: FAMILY MEDICINE

## 2024-07-12 RX ORDER — OFLOXACIN 3 MG/ML
SOLUTION/ DROPS OPHTHALMIC
Qty: 10 ML | Refills: 0 | Status: SHIPPED | OUTPATIENT
Start: 2024-07-12

## 2024-07-12 NOTE — TELEPHONE ENCOUNTER
Pt looking for olfoxacin drops for conjunctivitis. Advised e visit needed. Pt will do e visit.  Thalia DELANEYN, RN

## 2024-07-12 NOTE — PATIENT INSTRUCTIONS
Gudelia Thornton. I am concerned you are having red eye despite drops. I would advise seeing eye doctor soon. That doesn't look like pink eye and typically it improves in 2-3 days.  The fact this has been weeks is concerning to me.

## 2024-07-12 NOTE — TELEPHONE ENCOUNTER
[facility] Call Center    Phone Message    May a detailed message be left on voicemail: yes     Reason for Call: Other: Pt called in regards to eye infection. He hasn't heard back from the clinic. He will need a new prescription, but clinic stated he needs an appt. No appt available before August in . Pt will be able to do a virtual visit if that's possible. Please call pt back     Action Taken: Other: AN EYE    Travel Screening: Not Applicable     Date of Service:

## 2024-07-15 NOTE — TELEPHONE ENCOUNTER
[facility]    Phone Message    May a detailed message be left on voicemail: yes     Reason for Call: Symptoms or Concerns     If patient has red-flag symptoms, warm transfer to triage line    Current symptom or concern: Eye infection, previous enconter follow up    Symptoms have been present for:  5 day(s)    Has patient previously been seen for this? No    Patient called to state that he is going to miss his appointment set for today, 07/15 and attempted to reschedule. Writer unable to get him in in a timely manner per symptoms. Please call patient to schedule.       Are there any new or worsening symptoms? No    Action Taken: Message routed to:  Other: [facility]    Travel Screening: Not Applicable

## 2024-07-16 NOTE — CONFIDENTIAL NOTE
Spoke with patient. Patient complains of persistent irritation in right eye x 1 month.  I instructed patient to continue with not wearing contacts until eye is feeling better, use the antibiotic drops until the 10 days are up, and put preservative free artificial tears in eyes 4 - 6 times daily.   Made appointment to see Dr. Martinez in [place] on 07/22/2024. Patient will cancel appointment if eye is feeling better.

## 2024-07-22 ENCOUNTER — OFFICE VISIT (OUTPATIENT)
Dept: OPHTHALMOLOGY | Facility: CLINIC | Age: 49
End: 2024-07-22
Payer: COMMERCIAL

## 2024-07-22 DIAGNOSIS — H10.403 CHRONIC CONJUNCTIVITIS OF BOTH EYES, UNSPECIFIED CHRONIC CONJUNCTIVITIS TYPE: Primary | ICD-10-CM

## 2024-07-22 DIAGNOSIS — H52.13 SEVERE MYOPIA OF BOTH EYES: ICD-10-CM

## 2024-07-22 PROCEDURE — 92002 INTRM OPH EXAM NEW PATIENT: CPT | Performed by: OPHTHALMOLOGY

## 2024-07-22 RX ORDER — FLUOROMETHOLONE 0.1 %
1 SUSPENSION, DROPS(FINAL DOSAGE FORM)(ML) OPHTHALMIC (EYE) 2 TIMES DAILY
Qty: 5 ML | Refills: 2 | Status: SHIPPED | OUTPATIENT
Start: 2024-07-22

## 2024-07-22 ASSESSMENT — VISUAL ACUITY
OS_CC+: -1
OD_CC+: +2
OS_CC: 20/30
OD_PH_CC: 20/40
OD_CC: 20/50-1
CORRECTION_TYPE: CONTACTS
METHOD: SNELLEN - LINEAR

## 2024-07-22 ASSESSMENT — EXTERNAL EXAM - LEFT EYE: OS_EXAM: MILD ROSACEA

## 2024-07-22 ASSESSMENT — EXTERNAL EXAM - RIGHT EYE: OD_EXAM: MILD ROSACEA

## 2024-07-22 NOTE — PROGRESS NOTES
Current Eye Medications: Ofloxacin twice daily right eye. Artificial tears (blink for contacts) as needed.      Subjective: Here for evaluation of eye pain right eye. Patient complains of eye discomfort with some discharge in right eye. Started about 1 month ago. Patient wears contacts everyday (gas permeable) Patient took a break from wearing the contacts for a couple weeks. Currently has contacts in. Overall eye is feeling better today.     Has glasses.     Objective:  See Ophthalmology Exam.       Assessment:  Chronic conjunctivitis both eyes; possible GPC.      Plan:  Begin:   FML Drops - install one drop in the morning, then wait at least 5 minutes before putting in your contacts, then install another drop in both eyes after you take out your contacts at night. (Twice daily in both eyes)    Stop:  Ofloxacin drops    Continue preservative free artifical tears (Blink for CLs, Refresh Plus, Systane Complete, or generic preservative free tears are ok.)    I will message Dr. Rose to see if she thinks new contacts may be needed.     Will send a referral to the cornea specialists at [facility] for discussion of refractive options. Their office will contact you to schedule.     John Martinez M.D.  [phone]

## 2024-07-22 NOTE — Clinical Note
Sha Lucio, I was wondering if Norberto would benefit from either lens buffing or new GPCLs.  I've also put in a referral to [facility] for a discussion of refractive options per his request. Thanks! John

## 2024-07-22 NOTE — LETTER
7/22/2024      Norberto Doty  [address] [facility] [address]      Dear Colleague,    Thank you for referring your patient, Norberto Doty, to the [facility]. Please see a copy of my visit note below.     Current Eye Medications: Ofloxacin twice daily right eye. Artificial tears (blink for contacts) as needed.      Subjective: Here for evaluation of eye pain right eye. Patient complains of eye discomfort with some discharge in right eye. Started about 1 month ago. Patient wears contacts everyday (gas permeable) Patient took a break from wearing the contacts for a couple weeks. Currently has contacts in. Overall eye is feeling better today.     Has glasses.     Objective:  See Ophthalmology Exam.       Assessment:  Chronic conjunctivitis both eyes; possible GPC.      Plan:  Begin:   FML Drops - install one drop in the morning, then wait at least 5 minutes before putting in your contacts, then install another drop in both eyes after you take out your contacts at night. (Twice daily in both eyes)    Stop:  Ofloxacin drops    Continue preservative free artifical tears (Blink for CLs, Refresh Plus, Systane Complete, or generic preservative free tears are ok.)    I will message Dr. Rose to see if she thinks new contacts may be needed.     Will send a referral to the cornea specialists at [facility] for discussion of refractive options. Their office will contact you to schedule.     John Martinez M.D.  [phone]         Again, thank you for allowing me to participate in the care of your patient.        Sincerely,        John Martinez MD

## 2024-07-22 NOTE — PATIENT INSTRUCTIONS
Begin:   FML Drops - install one drop in the morning, then wait at least 5 minutes before putting in your contacts, then install another drop in both eyes after you take out your contacts at night. (Twice daily in both eyes)    Stop:  Ofloxacin drops    Continue preservative free artifical tears (Blink for CLs, Refresh Plus, Systane Complete, or generic preservative free tears are ok.)    I will message Dr. Rose to see if she thinks new contacts may be needed.     Will send a referral to the cornea specialists at [facility]. Their office will contact you to schedule.     John Martinez M.D.  [phone]

## 2024-08-27 ENCOUNTER — TRANSFERRED RECORDS (OUTPATIENT)
Dept: HEALTH INFORMATION MANAGEMENT | Facility: CLINIC | Age: 49
End: 2024-08-27
Payer: COMMERCIAL

## 2024-08-30 ENCOUNTER — TELEPHONE (OUTPATIENT)
Dept: FAMILY MEDICINE | Facility: CLINIC | Age: 49
End: 2024-08-30
Payer: COMMERCIAL

## 2024-08-30 NOTE — TELEPHONE ENCOUNTER
Order/Referral Request:    Who is requesting: Patient     Orders being requested: Insurance referral for ophthalmology eye specialists at [facility] - please back date the referral and add multiple visits     Reason service is needed/diagnosis: Bad eye site     When are orders needed by: asap    Has this been discussed with Provider: Yes    Does patient have a preference on a Group/Provider/Facility? [facility]     Does patient have an appointment scheduled?: No    Where to send orders: [facility] - fax number is [phone]    Could we send this information to you in [employer] or would you prefer to receive a phone call?:   Patient would prefer a phone call     Okay to leave a detailed message?: Yes at Home number on file [phone] (home)     Nicki TSANG,    [facility]

## 2024-10-11 DIAGNOSIS — E11.65 TYPE 2 DIABETES MELLITUS WITH HYPERGLYCEMIA, WITH LONG-TERM CURRENT USE OF INSULIN (H): ICD-10-CM

## 2024-10-11 DIAGNOSIS — Z79.4 TYPE 2 DIABETES MELLITUS WITH HYPERGLYCEMIA, WITH LONG-TERM CURRENT USE OF INSULIN (H): ICD-10-CM

## 2024-10-11 RX ORDER — PROCHLORPERAZINE 25 MG/1
SUPPOSITORY RECTAL
Qty: 1 EACH | Refills: 1 | Status: SHIPPED | OUTPATIENT
Start: 2024-10-11

## 2024-12-08 DIAGNOSIS — E11.65 TYPE 2 DIABETES MELLITUS WITH HYPERGLYCEMIA, WITH LONG-TERM CURRENT USE OF INSULIN (H): ICD-10-CM

## 2024-12-08 DIAGNOSIS — Z79.4 TYPE 2 DIABETES MELLITUS WITH HYPERGLYCEMIA, WITH LONG-TERM CURRENT USE OF INSULIN (H): ICD-10-CM

## 2024-12-08 DIAGNOSIS — E11.9 TYPE 2 DIABETES MELLITUS WITHOUT COMPLICATION, WITHOUT LONG-TERM CURRENT USE OF INSULIN (H): Chronic | ICD-10-CM

## 2024-12-08 NOTE — LETTER
December 11, 2024    Norberto Doty  [address] [facility] [address]    Dear Norberto,       We recently received a refill request for atorvastatin (LIPITOR) 40 MG tablet .  We have refilled this for a one time 90 day supply only because you are due for a:    Medication check office visit and fasting lab appointment-needed in the next month per Dr Hills.      Please schedule an office visit with your provider and a lab appointment 4-5 days prior to the office visit.     Please call at your earliest convenience so that there will not be a delay with your future refills.          Thank you,   Your [facility] Team/  [phone]

## 2024-12-09 RX ORDER — ATORVASTATIN CALCIUM 40 MG/1
40 TABLET, FILM COATED ORAL DAILY
Qty: 90 TABLET | Refills: 0 | Status: SHIPPED | OUTPATIENT
Start: 2024-12-09

## 2024-12-09 RX ORDER — DAPAGLIFLOZIN 10 MG/1
10 TABLET, FILM COATED ORAL DAILY
Qty: 90 TABLET | Refills: 0 | Status: SHIPPED | OUTPATIENT
Start: 2024-12-09

## 2024-12-09 NOTE — TELEPHONE ENCOUNTER
Needs to be seen in the next month by me with  Pre visit labs. May add to the last half of a 30 minute appointment. Do not send letter but call to make the appointment.     none

## 2024-12-09 NOTE — TELEPHONE ENCOUNTER
Left message on patient's voicemail to call and set up a fasting lab appointment and an appointment.Hilaria Alcantar [facility]

## 2024-12-10 ENCOUNTER — MYC MEDICAL ADVICE (OUTPATIENT)
Dept: FAMILY MEDICINE | Facility: CLINIC | Age: 49
End: 2024-12-10
Payer: COMMERCIAL

## 2024-12-11 NOTE — TELEPHONE ENCOUNTER
No return call, no response Mychart message. Mailed letter.Hilaria HOWELL [facility]

## 2024-12-28 ENCOUNTER — HEALTH MAINTENANCE LETTER (OUTPATIENT)
Age: 49
End: 2024-12-28

## 2025-01-02 ENCOUNTER — TELEPHONE (OUTPATIENT)
Dept: FAMILY MEDICINE | Facility: CLINIC | Age: 50
End: 2025-01-02
Payer: COMMERCIAL

## 2025-01-02 NOTE — TELEPHONE ENCOUNTER
Prior Authorization Retail Medication Request    Medication/Dose: Semaglutide-Weight Management (WEGOVY) 2.4 MG/0.75ML pen  Diagnosis and ICD code (if different than what is on RX):    Type 2 diabetes mellitus without complication, without long-term current use of insulin (H) [E11.9]        New/renewal/insurance change PA/secondary ins. PA:  Previously Tried and Failed:    Rationale:      Covermymeds Key: [id number]

## 2025-01-03 NOTE — TELEPHONE ENCOUNTER
Left message for patient to call back with an updated weight, or send it to us via [employer].Hilaria Alcantar [facility]

## 2025-01-06 NOTE — TELEPHONE ENCOUNTER
Called and spoke with patient, he does not know his current weight. Set up a PVL appointment and an appointment with Dr Hills.Hilaria Alcantar [facility]

## 2025-01-09 NOTE — TELEPHONE ENCOUNTER
Patient did know know his current weight. He is going to come in for a visit, so we can check his weight.Hilaria Alcantar [facility]

## 2025-01-16 ENCOUNTER — LAB (OUTPATIENT)
Dept: LAB | Facility: CLINIC | Age: 50
End: 2025-01-16
Payer: COMMERCIAL

## 2025-01-16 DIAGNOSIS — E11.9 TYPE 2 DIABETES MELLITUS WITHOUT COMPLICATION, WITHOUT LONG-TERM CURRENT USE OF INSULIN (H): ICD-10-CM

## 2025-01-16 LAB
ALBUMIN SERPL BCG-MCNC: 4.6 G/DL (ref 3.5–5.2)
ALP SERPL-CCNC: 94 U/L (ref 40–150)
ALT SERPL W P-5'-P-CCNC: 50 U/L (ref 0–70)
ANION GAP SERPL CALCULATED.3IONS-SCNC: 10 MMOL/L (ref 7–15)
AST SERPL W P-5'-P-CCNC: 27 U/L (ref 0–45)
BASOPHILS # BLD AUTO: 0 10E3/UL (ref 0–0.2)
BASOPHILS NFR BLD AUTO: 0 %
BILIRUB SERPL-MCNC: 1.1 MG/DL
BUN SERPL-MCNC: 16.6 MG/DL (ref 6–20)
CALCIUM SERPL-MCNC: 9.7 MG/DL (ref 8.8–10.4)
CHLORIDE SERPL-SCNC: 103 MMOL/L (ref 98–107)
CHOLEST SERPL-MCNC: 145 MG/DL
CREAT SERPL-MCNC: 0.84 MG/DL (ref 0.67–1.17)
EGFRCR SERPLBLD CKD-EPI 2021: >90 ML/MIN/1.73M2
EOSINOPHIL # BLD AUTO: 0.1 10E3/UL (ref 0–0.7)
EOSINOPHIL NFR BLD AUTO: 2 %
ERYTHROCYTE [DISTWIDTH] IN BLOOD BY AUTOMATED COUNT: 13.6 % (ref 10–15)
EST. AVERAGE GLUCOSE BLD GHB EST-MCNC: 163 MG/DL
FASTING STATUS PATIENT QL REPORTED: YES
FASTING STATUS PATIENT QL REPORTED: YES
GLUCOSE SERPL-MCNC: 164 MG/DL (ref 70–99)
HBA1C MFR BLD: 7.3 % (ref 0–5.6)
HCO3 SERPL-SCNC: 26 MMOL/L (ref 22–29)
HCT VFR BLD AUTO: 49.9 % (ref 40–53)
HDLC SERPL-MCNC: 43 MG/DL
HGB BLD-MCNC: 16.7 G/DL (ref 13.3–17.7)
IMM GRANULOCYTES # BLD: 0 10E3/UL
IMM GRANULOCYTES NFR BLD: 0 %
LDLC SERPL CALC-MCNC: 70 MG/DL
LYMPHOCYTES # BLD AUTO: 1.7 10E3/UL (ref 0.8–5.3)
LYMPHOCYTES NFR BLD AUTO: 23 %
MCH RBC QN AUTO: 26.8 PG (ref 26.5–33)
MCHC RBC AUTO-ENTMCNC: 33.5 G/DL (ref 31.5–36.5)
MCV RBC AUTO: 80 FL (ref 78–100)
MONOCYTES # BLD AUTO: 0.6 10E3/UL (ref 0–1.3)
MONOCYTES NFR BLD AUTO: 7 %
NEUTROPHILS # BLD AUTO: 5.1 10E3/UL (ref 1.6–8.3)
NEUTROPHILS NFR BLD AUTO: 68 %
NONHDLC SERPL-MCNC: 102 MG/DL
PLATELET # BLD AUTO: 128 10E3/UL (ref 150–450)
POTASSIUM SERPL-SCNC: 4.5 MMOL/L (ref 3.4–5.3)
PROT SERPL-MCNC: 7.6 G/DL (ref 6.4–8.3)
RBC # BLD AUTO: 6.22 10E6/UL (ref 4.4–5.9)
SODIUM SERPL-SCNC: 139 MMOL/L (ref 135–145)
TRIGL SERPL-MCNC: 162 MG/DL
TSH SERPL DL<=0.005 MIU/L-ACNC: 3.15 UIU/ML (ref 0.3–4.2)
WBC # BLD AUTO: 7.5 10E3/UL (ref 4–11)

## 2025-01-22 ENCOUNTER — OFFICE VISIT (OUTPATIENT)
Dept: FAMILY MEDICINE | Facility: CLINIC | Age: 50
End: 2025-01-22
Payer: COMMERCIAL

## 2025-01-22 VITALS
HEART RATE: 83 BPM | TEMPERATURE: 97.8 F | BODY MASS INDEX: 43.88 KG/M2 | RESPIRATION RATE: 18 BRPM | WEIGHT: 313.4 LBS | SYSTOLIC BLOOD PRESSURE: 136 MMHG | DIASTOLIC BLOOD PRESSURE: 78 MMHG | OXYGEN SATURATION: 95 % | HEIGHT: 71 IN

## 2025-01-22 DIAGNOSIS — E11.9 TYPE 2 DIABETES MELLITUS WITHOUT COMPLICATION, WITHOUT LONG-TERM CURRENT USE OF INSULIN (H): Primary | Chronic | ICD-10-CM

## 2025-01-22 PROCEDURE — 99214 OFFICE O/P EST MOD 30 MIN: CPT | Performed by: FAMILY MEDICINE

## 2025-01-22 RX ORDER — DAPAGLIFLOZIN 10 MG/1
10 TABLET, FILM COATED ORAL DAILY
Qty: 90 TABLET | Refills: 1 | Status: SHIPPED | OUTPATIENT
Start: 2025-01-22

## 2025-01-22 RX ORDER — ATORVASTATIN CALCIUM 40 MG/1
40 TABLET, FILM COATED ORAL DAILY
Qty: 90 TABLET | Refills: 1 | Status: SHIPPED | OUTPATIENT
Start: 2025-01-22

## 2025-01-22 ASSESSMENT — PAIN SCALES - GENERAL: PAINLEVEL_OUTOF10: NO PAIN (0)

## 2025-01-22 NOTE — PROGRESS NOTES
ICD-10-CM    1. Type 2 diabetes mellitus (H)  E11.9       2. Type 2 diabetes mellitus without complication, without long-term current use of insulin (H)  E11.9 atorvastatin (LIPITOR) 40 MG tablet     FARXIGA 10 MG TABS tablet     Semaglutide-Weight Management (WEGOVY) 2.4 MG/0.75ML pen       PLAN:Follow up in 6 months       Bay Thornton is a 49 year old, presenting for the following health issues:  Diabetes        1/22/2025     8:24 AM   Additional Questions   Roomed by Nazanin TSANG CMA   Accompanied by ALMA     History of Present Illness       Diabetes:   He presents for follow up of diabetes.  He is checking home blood glucose one time daily.   He checks blood glucose after meals.  Blood glucose is sometimes over 200 and never under 70.  When his blood glucose is low, the patient is asymptomatic for confusion, blurred vision, lethargy and reports not feeling dizzy, shaky, or weak.  He is concerned about blood sugar frequently over 200.    He is not experiencing numbness or burning in feet, excessive thirst, blurry vision, weight changes or redness, sores or blisters on feet.           He eats 0-1 servings of fruits and vegetables daily.He consumes 2 sweetened beverage(s) daily.He exercises with enough effort to increase his heart rate 9 or less minutes per day.  He exercises with enough effort to increase his heart rate 3 or less days per week. He is missing 1 dose(s) of medications per week.     SUBJECTIVE:  49 year old enters for recheck of high cholesterol.  Pt. Has been taking med and has no side effects. Pt is following diet.  Denies chest pain and SOB.  Past Medical History:   Diagnosis Date    Acute calculous cholecystitis 01/16/2019    Diabetes (H)     Hematuria 01/16/2019    Hypertension     Sepsis (H) 01/15/2019     Past Surgical History:   Procedure Laterality Date    NO HISTORY OF SURGERY         Current Outpatient Medications:     atorvastatin (LIPITOR) 40 MG tablet, Take 1 tablet (40 mg) by mouth  "daily., Disp: 90 tablet, Rfl: 1    Carboxymeth-Glycerin-Polysorb (REFRESH OPTIVE ADVANCED) 0.5-1-0.5 % SOLN, Apply 1 drop to eye 2 times daily, Disp: , Rfl:     Continuous Glucose Sensor (DEXCOM G6 SENSOR) MISC, USE ONE SENSOR EVERY 10 DAYS, Disp: 9 each, Rfl: 1    Continuous Glucose Transmitter (DEXCOM G6 TRANSMITTER) MISC, CHANGE EVERY 3 MONTHS, Disp: 1 each, Rfl: 1    FARXIGA 10 MG TABS tablet, Take 1 tablet (10 mg) by mouth daily., Disp: 90 tablet, Rfl: 1    fluorometholone (FML LIQUIFILM) 0.1 % ophthalmic suspension, Place 1 drop into both eyes 2 times daily, Disp: 5 mL, Rfl: 2    insulin degludec (TRESIBA FLEXTOUCH) 100 UNIT/ML pen, New: 40 units in the am Old, Disp: 45 mL, Rfl: 0    insulin pen needle (BD RAMILA U/F) 32G X 4 MM miscellaneous, Use 1 daily as directed., Disp: 100 each, Rfl: 3    lisinopril (ZESTRIL) 2.5 MG tablet, Take 1 tablet (2.5 mg) by mouth daily, Disp: 90 tablet, Rfl: 3    ofloxacin (OCUFLOX) 0.3 % ophthalmic solution, INSTILL 1 TO 2 DROPS INTO RIGHT EYE 4 TIMES DAILY FOR 10 DAYS, Disp: 10 mL, Rfl: 0    omeprazole (PRILOSEC) 40 MG DR capsule, Take 1 capsule (40 mg) by mouth daily, Disp: 90 capsule, Rfl: 3    Semaglutide-Weight Management (WEGOVY) 2.4 MG/0.75ML pen, Inject 2.4 mg subcutaneously every 7 days., Disp: 9 mL, Rfl: 4    tadalafil (CIALIS) 5 MG tablet, TAKE 1 TABLET BY MOUTH EVERY 24 HOURS, Disp: 30 tablet, Rfl: 5  Reviewed health maintenance   Patient Active Problem List   Diagnosis    Punctate keratitis, bilateral    Uncontrolled diabetes mellitus type 2 without complications    Morbid obesity due to excess calories (H)    Mixed hyperlipidemia    Essential hypertension    Type 2 diabetes mellitus (H)    DENISE (obstructive sleep apnea)    Gastroesophageal reflux disease without esophagitis    Cervical radiculopathy    Chronic left shoulder pain       OBJECTIVE:  no apparent distress  /78   Pulse 83   Temp 97.8  F (36.6  C) (Oral)   Resp 18   Ht 1.816 m (5' 11.5\")   Wt (!) " 142.2 kg (313 lb 6.4 oz)   SpO2 95%   BMI 43.11 kg/m        Exam:  Constitutional: healthy, alert and no distress  Head: Normocephalic. No masses, lesions, tenderness or abnormalities  Neck: Neck supple. No adenopathy. Thyroid symmetric, normal size,  Cardiovascular: negative, PMI normal. No lifts, heaves, or thrills. RRR. No murmurs, clicks gallops or rub  Respiratory: negative Lungs clear    Lab on 01/16/2025   Component Date Value Ref Range Status    Estimated Average Glucose 01/16/2025 163 (H)  <117 mg/dL Final    Hemoglobin A1C 01/16/2025 7.3 (H)  0.0 - 5.6 % Final    Normal <5.7%   Prediabetes 5.7-6.4%    Diabetes 6.5% or higher     Note: Adopted from ADA consensus guidelines.    Sodium 01/16/2025 139  135 - 145 mmol/L Final    Potassium 01/16/2025 4.5  3.4 - 5.3 mmol/L Final    Carbon Dioxide (CO2) 01/16/2025 26  22 - 29 mmol/L Final    Anion Gap 01/16/2025 10  7 - 15 mmol/L Final    Urea Nitrogen 01/16/2025 16.6  6.0 - 20.0 mg/dL Final    Creatinine 01/16/2025 0.84  0.67 - 1.17 mg/dL Final    GFR Estimate 01/16/2025 >90  >60 mL/min/1.73m2 Final    eGFR calculated using 2021 CKD-EPI equation.    Calcium 01/16/2025 9.7  8.8 - 10.4 mg/dL Final    Reference intervals for this test were updated on 7/16/2024 to reflect our healthy population more accurately. There may be differences in the flagging of prior results with similar values performed with this method. Those prior results can be interpreted in the context of the updated reference intervals.    Chloride 01/16/2025 103  98 - 107 mmol/L Final    Glucose 01/16/2025 164 (H)  70 - 99 mg/dL Final    Alkaline Phosphatase 01/16/2025 94  40 - 150 U/L Final    AST 01/16/2025 27  0 - 45 U/L Final    ALT 01/16/2025 50  0 - 70 U/L Final    Protein Total 01/16/2025 7.6  6.4 - 8.3 g/dL Final    Albumin 01/16/2025 4.6  3.5 - 5.2 g/dL Final    Bilirubin Total 01/16/2025 1.1  <=1.2 mg/dL Final    Patient Fasting > 8hrs? 01/16/2025 Yes   Final    Cholesterol 01/16/2025  145  <200 mg/dL Final    Triglycerides 01/16/2025 162 (H)  <150 mg/dL Final    Direct Measure HDL 01/16/2025 43  >=40 mg/dL Final    LDL Cholesterol Calculated 01/16/2025 70  <100 mg/dL Final    Non HDL Cholesterol 01/16/2025 102  <130 mg/dL Final    Patient Fasting > 8hrs? 01/16/2025 Yes   Final    TSH 01/16/2025 3.15  0.30 - 4.20 uIU/mL Final    WBC Count 01/16/2025 7.5  4.0 - 11.0 10e3/uL Final    RBC Count 01/16/2025 6.22 (H)  4.40 - 5.90 10e6/uL Final    Hemoglobin 01/16/2025 16.7  13.3 - 17.7 g/dL Final    Hematocrit 01/16/2025 49.9  40.0 - 53.0 % Final    MCV 01/16/2025 80  78 - 100 fL Final    MCH 01/16/2025 26.8  26.5 - 33.0 pg Final    MCHC 01/16/2025 33.5  31.5 - 36.5 g/dL Final    RDW 01/16/2025 13.6  10.0 - 15.0 % Final    Platelet Count 01/16/2025 128 (L)  150 - 450 10e3/uL Final    % Neutrophils 01/16/2025 68  % Final    % Lymphocytes 01/16/2025 23  % Final    % Monocytes 01/16/2025 7  % Final    % Eosinophils 01/16/2025 2  % Final    % Basophils 01/16/2025 0  % Final    % Immature Granulocytes 01/16/2025 0  % Final    Absolute Neutrophils 01/16/2025 5.1  1.6 - 8.3 10e3/uL Final    Absolute Lymphocytes 01/16/2025 1.7  0.8 - 5.3 10e3/uL Final    Absolute Monocytes 01/16/2025 0.6  0.0 - 1.3 10e3/uL Final    Absolute Eosinophils 01/16/2025 0.1  0.0 - 0.7 10e3/uL Final    Absolute Basophils 01/16/2025 0.0  0.0 - 0.2 10e3/uL Final    Absolute Immature Granulocytes 01/16/2025 0.0  <=0.4 10e3/uL Final        SUBJECTIVE:  Norberto Doty presents today for follow up of DIABETES MELLITUS  Patient Active Problem List   Diagnosis    Punctate keratitis, bilateral    Uncontrolled diabetes mellitus type 2 without complications    Morbid obesity due to excess calories (H)    Mixed hyperlipidemia    Essential hypertension    Type 2 diabetes mellitus (H)    DENISE (obstructive sleep apnea)    Gastroesophageal reflux disease without esophagitis    Cervical radiculopathy    Chronic left shoulder pain       Smokes  n  PHQ-2  Over the last two weeks- Have you been bothered by little interest or pleasure in doing things?  Stress with being laid off  BP:   BP Readings from Last 3 Encounters:   01/22/25 136/78   06/05/24 129/84   01/19/24 (!) 149/95       Lab Results   Component Value Date    A1C 7.3 01/16/2025    A1C 7.2 06/05/2024    A1C 6.8 11/15/2023    A1C 9.0 06/02/2021    A1C 7.1 10/22/2020    A1C 11.0 07/08/2020       Recent Labs   Lab Test 01/16/25  0911 11/15/23  1153   CHOL 145 127   HDL 43 47   LDL 70 53   TRIG 162* 135       Wt Readings from Last 3 Encounters:   01/22/25 (!) 142.2 kg (313 lb 6.4 oz)   06/05/24 139.4 kg (307 lb 6.4 oz)   01/19/24 144.2 kg (318 lb)     Asa is included in med list    Current Outpatient Medications   Medication Sig Dispense Refill    atorvastatin (LIPITOR) 40 MG tablet Take 1 tablet (40 mg) by mouth daily. 90 tablet 1    Carboxymeth-Glycerin-Polysorb (REFRESH OPTIVE ADVANCED) 0.5-1-0.5 % SOLN Apply 1 drop to eye 2 times daily      Continuous Glucose Sensor (DEXCOM G6 SENSOR) MISC USE ONE SENSOR EVERY 10 DAYS 9 each 1    Continuous Glucose Transmitter (DEXCOM G6 TRANSMITTER) MISC CHANGE EVERY 3 MONTHS 1 each 1    FARXIGA 10 MG TABS tablet Take 1 tablet (10 mg) by mouth daily. 90 tablet 1    fluorometholone (FML LIQUIFILM) 0.1 % ophthalmic suspension Place 1 drop into both eyes 2 times daily 5 mL 2    insulin degludec (TRESIBA FLEXTOUCH) 100 UNIT/ML pen New: 40 units in the am Old 45 mL 0    insulin pen needle (BD RAMILA U/F) 32G X 4 MM miscellaneous Use 1 daily as directed. 100 each 3    lisinopril (ZESTRIL) 2.5 MG tablet Take 1 tablet (2.5 mg) by mouth daily 90 tablet 3    ofloxacin (OCUFLOX) 0.3 % ophthalmic solution INSTILL 1 TO 2 DROPS INTO RIGHT EYE 4 TIMES DAILY FOR 10 DAYS 10 mL 0    omeprazole (PRILOSEC) 40 MG DR capsule Take 1 capsule (40 mg) by mouth daily 90 capsule 3    Semaglutide-Weight Management (WEGOVY) 2.4 MG/0.75ML pen Inject 2.4 mg subcutaneously every 7 days. 9 mL 4     "tadalafil (CIALIS) 5 MG tablet TAKE 1 TABLET BY MOUTH EVERY 24 HOURS 30 tablet 5       Histories reviewed and updated in Epic.     EXAM:  Vitals: /78   Pulse 83   Temp 97.8  F (36.6  C) (Oral)   Resp 18   Ht 1.816 m (5' 11.5\")   Wt (!) 142.2 kg (313 lb 6.4 oz)   SpO2 95%   BMI 43.11 kg/m    BMIE= Body mass index is 43.11 kg/m .  GENERAL APPEARANCE: alert and no acute distress  PSYCH: mentation appears normal and affect normal/bright  RESP: lungs clear to auscultation - no rales, rhonchi or wheezes  CV: regular rate and rhythm, normal S1 S2, no S3 or S4 and no murmur, click or rub -  EXT: no cyanosis or edema in lower extremities  SKIN: no venous stasis changes    ICD-10-CM    1. Type 2 diabetes mellitus (H)  E11.9       2. Type 2 diabetes mellitus without complication, without long-term current use of insulin (H)  E11.9 atorvastatin (LIPITOR) 40 MG tablet     FARXIGA 10 MG TABS tablet     Semaglutide-Weight Management (WEGOVY) 2.4 MG/0.75ML pen       PLAN:Follow up in 6 months        Signed Electronically by: Triston Hills MD    "

## 2025-03-11 DIAGNOSIS — E11.65 TYPE 2 DIABETES MELLITUS WITH HYPERGLYCEMIA, WITHOUT LONG-TERM CURRENT USE OF INSULIN (H): ICD-10-CM

## 2025-03-11 RX ORDER — PROCHLORPERAZINE 25 MG/1
SUPPOSITORY RECTAL
Qty: 9 EACH | Refills: 1 | Status: SHIPPED | OUTPATIENT
Start: 2025-03-11

## 2025-03-11 NOTE — TELEPHONE ENCOUNTER
Patient is calling to request a refill on continuous glucose sensors.     Patient states that he is caring for his mom in [place] and needs his prescription sent to the [facility] pharmacy in [facility]- pharmacy has been updated and prescription pended. Patient has one sensor left. Routing to refill team for renewal to different pharmacy.     Archie Ballard, RN, BSN

## 2025-04-17 ENCOUNTER — OFFICE VISIT (OUTPATIENT)
Dept: PEDIATRICS | Facility: CLINIC | Age: 50
End: 2025-04-17
Payer: COMMERCIAL

## 2025-04-17 ENCOUNTER — NURSE TRIAGE (OUTPATIENT)
Dept: FAMILY MEDICINE | Facility: CLINIC | Age: 50
End: 2025-04-17

## 2025-04-17 VITALS
RESPIRATION RATE: 18 BRPM | DIASTOLIC BLOOD PRESSURE: 83 MMHG | HEART RATE: 84 BPM | TEMPERATURE: 97.6 F | OXYGEN SATURATION: 97 % | SYSTOLIC BLOOD PRESSURE: 125 MMHG

## 2025-04-17 DIAGNOSIS — R19.7 DIARRHEA, UNSPECIFIED TYPE: ICD-10-CM

## 2025-04-17 DIAGNOSIS — R10.12 ABDOMINAL PAIN, LEFT UPPER QUADRANT: Primary | ICD-10-CM

## 2025-04-17 LAB
ALBUMIN SERPL BCG-MCNC: 4.8 G/DL (ref 3.5–5.2)
ALP SERPL-CCNC: 96 U/L (ref 40–150)
ALT SERPL W P-5'-P-CCNC: 49 U/L (ref 0–70)
ANION GAP SERPL CALCULATED.3IONS-SCNC: 14 MMOL/L (ref 7–15)
AST SERPL W P-5'-P-CCNC: 21 U/L (ref 0–45)
BASOPHILS # BLD AUTO: 0 10E3/UL (ref 0–0.2)
BASOPHILS NFR BLD AUTO: 1 %
BILIRUB SERPL-MCNC: 1.6 MG/DL
BUN SERPL-MCNC: 17.5 MG/DL (ref 6–20)
CALCIUM SERPL-MCNC: 9.5 MG/DL (ref 8.8–10.4)
CHLORIDE SERPL-SCNC: 103 MMOL/L (ref 98–107)
CREAT SERPL-MCNC: 0.78 MG/DL (ref 0.67–1.17)
EGFRCR SERPLBLD CKD-EPI 2021: >90 ML/MIN/1.73M2
EOSINOPHIL # BLD AUTO: 0.1 10E3/UL (ref 0–0.7)
EOSINOPHIL NFR BLD AUTO: 1 %
ERYTHROCYTE [DISTWIDTH] IN BLOOD BY AUTOMATED COUNT: 13.4 % (ref 10–15)
GLUCOSE SERPL-MCNC: 133 MG/DL (ref 70–99)
HCO3 SERPL-SCNC: 21 MMOL/L (ref 22–29)
HCT VFR BLD AUTO: 51.6 % (ref 40–53)
HGB BLD-MCNC: 17.6 G/DL (ref 13.3–17.7)
IMM GRANULOCYTES # BLD: 0 10E3/UL
IMM GRANULOCYTES NFR BLD: 0 %
LYMPHOCYTES # BLD AUTO: 1.6 10E3/UL (ref 0.8–5.3)
LYMPHOCYTES NFR BLD AUTO: 19 %
MCH RBC QN AUTO: 27.8 PG (ref 26.5–33)
MCHC RBC AUTO-ENTMCNC: 34.1 G/DL (ref 31.5–36.5)
MCV RBC AUTO: 82 FL (ref 78–100)
MONOCYTES # BLD AUTO: 0.7 10E3/UL (ref 0–1.3)
MONOCYTES NFR BLD AUTO: 8 %
NEUTROPHILS # BLD AUTO: 6 10E3/UL (ref 1.6–8.3)
NEUTROPHILS NFR BLD AUTO: 72 %
NRBC # BLD AUTO: 0 10E3/UL
NRBC BLD AUTO-RTO: 0 /100
PLATELET # BLD AUTO: 157 10E3/UL (ref 150–450)
POTASSIUM SERPL-SCNC: 4 MMOL/L (ref 3.4–5.3)
PROT SERPL-MCNC: 8 G/DL (ref 6.4–8.3)
RBC # BLD AUTO: 6.33 10E6/UL (ref 4.4–5.9)
SODIUM SERPL-SCNC: 138 MMOL/L (ref 135–145)
WBC # BLD AUTO: 8.3 10E3/UL (ref 4–11)

## 2025-04-17 ASSESSMENT — PAIN SCALES - GENERAL: PAINLEVEL_OUTOF10: MILD PAIN (3)

## 2025-04-17 NOTE — TELEPHONE ENCOUNTER
"Patient reports starting yesterday morning and mostly last night he has had stomach pain and diarrhea (liquid stools) every hours. The stomach pains were so bad last night that he was curled up in a ball. This has been on and off for the last 1.5 months and this is the 4th time it has happened.      He does have his appendix but had his gallbladder removed years ago. It is really painful. He reports it seems to get some relief with burping and passing gas.     He has tried on and off probiotic yogurt \"shot\" and gummies but not routinely. He has not tried any GERD or gas medication so far.      He has cut back on soda/alcohol and has only drank water for the most part.  .     Currently he has liquid stool but has minimized. He does still have abdominal pain but that minimized. He has not tried eating anything this morning.   His bottom is getting raw due to the frequency of stools.   He does not know how his sugars are as he has not put on a new sensor.   He is able to take all his medication as prescribed but has not yet today.   He has been taking Gatorade.   Currently the abdominal pain is rated 2-3/10. It is constant. It increases when he has a B.M. (bowel movement)  Jarring does not make the pain worse   He did try to force himself to vomit to see if that help the abdominal pain and it did not.     Denies: bloody stool, black tarry stools, vomiting, nausea, hematuria,     Nursing advice: [facility] was explained to Norberto and he is interested in [facility].  Patient was given signs and symptoms to go to the E.R. Patient verbalizes good understanding, agrees with plan and states he needs no further support. Leandra Chacon R.N.    Call placed to [facility] @ 10:21 am. The above information given to the R.N. The provider is in a room.  They will TEAMs me after provider review and recommendation. I reached out to [facility] MG chat  via TEAMs at 10:59 am and they reports they would grab the provider next time they come out.  I tried calling at " 11:30 am and the phone rolled over to  no message left. I called the patient and explained that we have not heard back about if they are going to take him or not. He reports that his symptoms are status quo and have not changed.  I asked if he would like to continue to wait for their response or if he would like to just go to the E.R. for assessment. He reports that he will give it another 30 minutes. I set at timer. If we do not get an answer in that time frame he will be sent to the E.R. for assessment.   I placed another call to [facility] at 11:37am asking if they have a response. They report that they will call and schedule the patient. Thank you. Leandra Bland [facility]  [address] [place] [address]  Go to check-in C  [phone]    Reason for Disposition   MILD TO MODERATE constant pain lasting > 2 hours   SEVERE diarrhea (e.g., 7 or more times / day more than normal) and age > 60 years    Additional Information   Negative: Passed out (e.g., fainted, lost consciousness, blacked out and was not responding)   Negative: Shock suspected (e.g., cold/pale/clammy skin, too weak to stand, low BP, rapid pulse)   Negative: Sounds like a life-threatening emergency to the triager   Negative: SEVERE abdominal pain (e.g., excruciating)   Negative: Vomiting red blood or black (coffee ground) material   Negative: Blood in bowel movements  (Exception: Blood on surface of BM with constipation.)   Negative: Black or tarry bowel movements  (Exception: Chronic-unchanged black-grey BMs AND is taking iron pills or Pepto-Bismol.)   Negative: Unable to urinate (or only a few drops) and bladder feels very full   Negative: Pain in scrotum persists > 1 hour   Negative: Shock suspected (e.g., cold/pale/clammy skin, too weak to stand, low BP, rapid pulse)   Negative: Difficult to awaken or acting confused (e.g., disoriented, slurred speech)   Negative: Sounds like a life-threatening emergency to the triager    Negative: SEVERE abdominal pain (e.g., excruciating) and present > 1 hour   Negative: SEVERE abdominal pain and age > 60 years   Negative: Bloody, black, or tarry bowel movements  (Exception: Chronic-unchanged black-grey bowel movements and is taking iron pills or Pepto-Bismol.)    Protocols used: Abdominal Pain - Male-A-OH, Diarrhea-A-OH

## 2025-04-17 NOTE — PATIENT INSTRUCTIONS
Call [facility] and schedule an appointment with them for follow up.     Submit your stool for infectious testing.     Please return to the [facility] tomorrow at 9 am for a CT abdomen.  Fast 6 hours prior to arrival.  You will drink contrast when you are here and wait one hour before getting the CT.

## 2025-04-17 NOTE — PROGRESS NOTES
"Acute [facility] Diagnostic Services Clinic Visit    Assessment & Plan     Abdominal pain, left upper quadrant  The patient presents with intermittent episodes of diarrhea, gas (\"above and below\") and abdominal pain on and off 1.5 months ago.  He started having symptoms again yesterday and then significant diarrhea overnight which is better today.  He still has luq abdominal pain. Cbc and comp met checked today and show no acute concerns.  Enteric panel and c diff ordered given duration of 1.5 months on and off.  I ordered and abdominal ct but the radiologist wants oral contrast and npo 6 hours prior to imaging and is requesting the patient return tomorrow.  Therefore the patient will come back to the [facility] in the morning.   - CT Abdomen Pelvis w Contrast  - CBC with platelets differential  - Comprehensive metabolic panel  - C. difficile Toxin B PCR with reflex to C. difficile EIA  - Enteric Bacteria and Virus Panel by [facility] Stool    Diarrhea, unspecified type  Labs without acute concerns.  C diff and enteric panel ordered and patient sent with testing kit.   - CBC with platelets differential  - Comprehensive metabolic panel  - C. difficile Toxin B PCR with reflex to C. difficile EIA  - Enteric Bacteria and Virus Panel by [facility] Stool      30 minutes spent by me on the date of the encounter doing chart review, review of test results, interpretation of tests, patient visit, and documentation       BMI  Estimated body mass index is 43.11 kg/m  as calculated from the following:    Height as of 1/22/25: 1.816 m (5' 11.5\").    Weight as of 1/22/25: 142.2 kg (313 lb 6.4 oz).         Follow-up      Bay Thornton is a 49 year old, presenting for the following health issues:  Abdominal Pain (For about a month and half - Intermittently )    The patient is a 50 yo male s/p cholecystectomy who presents to the [facility] for abdominal pain that began yesterday.  He said it comes on suddenly and he had non bloody. diarrhea every hour last night.  " "No vomiting.  He said his first episode was 1.5 months ago while traveling to [place] via car. He had \"gas from both ends\" and it lasted for 1-2 days.  He had another episode 2 weeks later with gas from both ends, stomach pain and liquid stool.      Yesterday it began again in the morning. The diarrhea started in the evening. Denies fevers. Denies hx of IBD.  No family hx of IBD. No recent antibiotic use, hospitalization. He had a colonoscopy and endoscopy a few years ago via [facility] without issue.     Gallbladder gone, no bowel issues. 1.5 mo ago in [place] gas both ends, stomach pain, trying to avoid carbonated beverages. Traveling drove.  Lasted 1-2 days then gone.     Came back again 2 weeks later.  Gas both ends, liquid stool and stomach pain.     Yesterday am began. Stomach pain mild. Liquid stool, chuyita stomach painful again. Ate dinner not help. Got worse. Liquid stool every hour all night long. Couldn't get comfortable. As night better. Brown liquid and yellow. Steady 3 pain. No nausea .  No fevers. No fh of bowel issues.  No exposure.  Drove to [place].  Camp in van. Bottle no recent abx or hospitalizeions.               Abdominal/Flank Pain  Onset/Duration: For about a month and half, intermittently, this is the 4th episode  Description:   Character: Painful currently   Location: right lower quadrant left lower quadrant  Radiation: None  Intensity: moderate  Progression of Symptoms:  same  Accompanying Signs & Symptoms:  Fever/chills: no   Gas/Bloating: YES  Nausea: no   Vomitting: Yes, 1 episode yesterday   Diarrhea: YES- 10+ episodes of diarrhea in the last 24 hours   Constipation:no   Dysuria: no            Hematuria: no            Frequency: no            Incontinence of urine: no   History:            Last bowel movement: today  Trauma: no   Previous similar pain: no    Previous tests done: none           Previous Abdominal surgery: YES- gallbladder removal, colonoscopy and endoscopy   Precipitating factors:   Does " the pain change with:     Food: YES- was a little better after breakfast, but after dinner the diarrhea started      Bowel Movement: no     Urination: no              Other factors: no   Therapies tried and outcome:  None    When food last eaten: Last Night             Objective    There were no vitals taken for this visit.  There is no height or weight on file to calculate BMI.  Physical Exam  Vitals and nursing note reviewed.   Constitutional:       General: He is not in acute distress.     Appearance: Normal appearance. He is obese. He is not ill-appearing, toxic-appearing or diaphoretic.      Comments: Drinking gatorade   HENT:      Head: Normocephalic and atraumatic.      Nose: Nose normal.   Eyes:      General: No scleral icterus.     Conjunctiva/sclera: Conjunctivae normal.   Cardiovascular:      Rate and Rhythm: Normal rate and regular rhythm.      Heart sounds: Normal heart sounds.   Pulmonary:      Effort: Pulmonary effort is normal.      Breath sounds: Normal breath sounds.   Abdominal:      General: Bowel sounds are normal. There is no distension.      Palpations: Abdomen is soft. There is no mass.      Tenderness: There is abdominal tenderness (luq). There is no guarding or rebound.      Hernia: No hernia is present.   Musculoskeletal:         General: Normal range of motion.      Cervical back: Normal range of motion.   Skin:     General: Skin is warm and dry.   Neurological:      General: No focal deficit present.      Mental Status: He is alert and oriented to person, place, and time.   Psychiatric:         Mood and Affect: Mood normal.            Results for orders placed or performed in visit on 04/17/25 (from the past 24 hours)   CBC with platelets differential    Narrative    The following orders were created for panel order CBC with platelets differential.  Procedure                               Abnormality         Status                     ---------                               -----------          ------                     CBC with platelets and ...[[id number]]  Abnormal            Final result                 Please view results for these tests on the individual orders.   Comprehensive metabolic panel   Result Value Ref Range    Sodium 138 135 - 145 mmol/L    Potassium 4.0 3.4 - 5.3 mmol/L    Carbon Dioxide (CO2) 21 (L) 22 - 29 mmol/L    Anion Gap 14 7 - 15 mmol/L    Urea Nitrogen 17.5 6.0 - 20.0 mg/dL    Creatinine 0.78 0.67 - 1.17 mg/dL    GFR Estimate >90 >60 mL/min/1.73m2    Calcium 9.5 8.8 - 10.4 mg/dL    Chloride 103 98 - 107 mmol/L    Glucose 133 (H) 70 - 99 mg/dL    Alkaline Phosphatase 96 40 - 150 U/L    AST 21 0 - 45 U/L    ALT 49 0 - 70 U/L    Protein Total 8.0 6.4 - 8.3 g/dL    Albumin 4.8 3.5 - 5.2 g/dL    Bilirubin Total 1.6 (H) <=1.2 mg/dL   CBC with platelets and differential   Result Value Ref Range    WBC Count 8.3 4.0 - 11.0 10e3/uL    RBC Count 6.33 (H) 4.40 - 5.90 10e6/uL    Hemoglobin 17.6 13.3 - 17.7 g/dL    Hematocrit 51.6 40.0 - 53.0 %    MCV 82 78 - 100 fL    MCH 27.8 26.5 - 33.0 pg    MCHC 34.1 31.5 - 36.5 g/dL    RDW 13.4 10.0 - 15.0 %    Platelet Count 157 150 - 450 10e3/uL    % Neutrophils 72 %    % Lymphocytes 19 %    % Monocytes 8 %    % Eosinophils 1 %    % Basophils 1 %    % Immature Granulocytes 0 %    NRBCs per 100 WBC 0 <1 /100    Absolute Neutrophils 6.0 1.6 - 8.3 10e3/uL    Absolute Lymphocytes 1.6 0.8 - 5.3 10e3/uL    Absolute Monocytes 0.7 0.0 - 1.3 10e3/uL    Absolute Eosinophils 0.1 0.0 - 0.7 10e3/uL    Absolute Basophils 0.0 0.0 - 0.2 10e3/uL    Absolute Immature Granulocytes 0.0 <=0.4 10e3/uL    Absolute NRBCs 0.0 10e3/uL           Signed Electronically by: Sepideh Smith MD  {Email feedback regarding this note to [email or web address]   :[id number]}

## 2025-04-18 ENCOUNTER — ANCILLARY PROCEDURE (OUTPATIENT)
Dept: CT IMAGING | Facility: CLINIC | Age: 50
End: 2025-04-18
Attending: EMERGENCY MEDICINE
Payer: COMMERCIAL

## 2025-04-18 PROCEDURE — 74177 CT ABD & PELVIS W/CONTRAST: CPT | Performed by: RADIOLOGY

## 2025-04-18 RX ORDER — IOPAMIDOL 755 MG/ML
149 INJECTION, SOLUTION INTRAVASCULAR ONCE
Status: COMPLETED | OUTPATIENT
Start: 2025-04-18 | End: 2025-04-18

## 2025-04-18 RX ADMIN — IOPAMIDOL 149 ML: 755 INJECTION, SOLUTION INTRAVASCULAR at 10:18

## 2025-05-13 ENCOUNTER — TRANSFERRED RECORDS (OUTPATIENT)
Dept: ADMINISTRATIVE | Facility: CLINIC | Age: 50
End: 2025-05-13
Payer: COMMERCIAL

## 2025-05-15 ENCOUNTER — RESULTS FOLLOW-UP (OUTPATIENT)
Dept: GASTROENTEROLOGY | Facility: CLINIC | Age: 50
End: 2025-05-15

## 2025-05-15 NOTE — PROCEDURES
[facility]   [address], [address], [place] [address]     Patient Name: Norberto Doty  Gender:  Male  Exam Date: 05/13/2025 Visit Number:  [id number]  Age: 49 Years YOB: 1975  Attending MD: Sasha Sanders MD Medical Record#:  [id number]    Procedure: Colonoscopy   Indications: Diarrhea       Weight loss      Referring MD: Referral Self  Primary MD:      Triston Hills MD  Medications: Admitting Medications:   0.9% Normal Saline at TK   Intra Procedure Medications:   Patient received monitored anesthesia care.     Complications: No immediate complications  ______________________________________________________________________________  Procedure:   An examination of the heart and lungs was performed and found to be within acceptable limits.  .  The patient was therefore deemed a reasonable candidate for endoscopy and sedation.   The risks and benefits of the procedure were explained to the patient.After obtaining informed consent, the patient received monitored anesthesia care and I passed the scope   without difficulty via the rectum to the ileum.  The appendiceal orifice and ic valve were identified.  The scope was retroflexed during the examination  The quality of the prep was good  (Miralax/Gatorade Double Prep).    This was a complete examination throughout the entire colon.    Findings:    Normal finding.  Location - ileum.     Inflammation.  Descriptor(s) - decreased vascularity.  Pattern - patchy.  Location - entire colon.    Polyp location: rectum.  Quantity: 1.  Size: 4 mm.  Polyp shape:  sessile.         Maneuver: polypectomy was performed with a cold snare.       Removal:  complete.  Retrieval: complete.  Bleeding: none.  Remainder of the exam is normal.  Random biopsies were taken throughout the colon to rule out microscopic colitis.    Impression:  Colorectal polyps  Diarrhea, unspecified type    Preliminary Plan:  Repeat colonoscopy in 5  years  Pathology Results:  A: COLON, RANDOM, BIOPSY:           1. Lymphocytic colitis           2. See comment      B: RECTUM, POLYP:           1. Hyperplastic polyp      COMMENTS  A. The histologic changes are typical of the lymphocytic colitis form of microscopic colitis. This diagnosis is further supported by the history of diarrhea and normal appearance of the mucosa on colonoscopy.    Occasionally prolonged infectious diarrhea that occurs in outbreaks (Marydel diarrhea) can show similar histologic changes. Some cases of lymphocytic colitis have been associated with medication use; high likelihood associations include NSAIDs, ASA, lansoprazole, ranitidine, sertraline, ticlopidine, and acarbose, with many other medications having been implicated as well.      MICROSCOPIC  A: Performed   B: Performed     Electronically signed by: Chivo Francis MD    Interpreted at [facility], [address] [place] [id number]    Orders  Labs:  Test Comments Timeframe Assessment   Celiac: TTG IgA + Total IgA  2 Weeks K52.832       Diagnostics:  Procedure Comments Timeframe Assessment   Colonoscopy Double prep 5 Years K63.5     Instruction(s)/Education:  Instruction/Education Timeframe Assessment   Colon Polyps  K63.5     Follow-up visit/Referral:  Order Comments   follow-up visit 2 Weeksor by 05/27/2025      If your health care provider has asked for a follow-up visit, your appointment will be scheduled with a nurse practitioner or physician assistant on your provider's care team, unless noted above.      Final Plan:  Repeat colonoscopy in 5 years.    We will attempt to contact you at appropriate intervals via [place] mail.  We may not be able to find you or contact you at that time, therefore you should know that the responsibility for following our recommendation rests with you.  If you don't hear from us at the time your procedure is due, please contact our office to schedule an  appointment.  If your contact information should change, please contact our office so that we can update your record.  Additional Comments:  As we discussed on the phone, I would recommend a medication called budesonide or Entocort to treat lymphocytic colitis.  I would also recommend a blood test to look for celiac disease and to follow-up in clinic to further discuss these results.  Please let me know if you have questions or concerns.      _Electronically signed by:___________________  Sasha Sanders MD                 05/13/2025    cc: Triston Hills MD

## 2025-05-22 DIAGNOSIS — N52.9 ERECTILE DYSFUNCTION, UNSPECIFIED ERECTILE DYSFUNCTION TYPE: ICD-10-CM

## 2025-05-22 RX ORDER — TADALAFIL 5 MG/1
5 TABLET ORAL
Qty: 30 TABLET | Refills: 1 | Status: SHIPPED | OUTPATIENT
Start: 2025-05-22

## 2025-05-27 ENCOUNTER — TRANSFERRED RECORDS (OUTPATIENT)
Dept: ADMINISTRATIVE | Facility: CLINIC | Age: 50
End: 2025-05-27
Payer: COMMERCIAL

## 2025-05-28 NOTE — PROCEDURES
2025        Norberto Doty   [address] [place] [address]-      Norberto Tinsleyyogesh,  :  1975    I am writing to let you know the results of the tests that were done the other day.   Thank you for allowing [facility] the opportunity to take part in your healthcare.  At [facility] we strive to provide each patient with the finest gastroenterology care available.  We hope your experience was pleasant and informative.    Tests for celiac disease were normal or negative which makes celiac disease unlikely.  IgA+t-Goel 2025 13:58   Description Result Units Flags Range   Immunoglobulin A, Qn, Serum 232 mg/dL     t-Transglutaminase (tTG) IgA <2 U/mL  0-3   Comments   Performed At: , [facility]  [address], [place], [id number]  Moe Rob MD, Phone: [phone]  Performed At: , [facility] [address], [id number]  Demar Harris MD, Phone: [phone]   t-Transglutaminase (tTG) IgA:                                               Negative        0 -  3                                               Weak Positive   4 - 10                                               Positive           >10                                                                    .                  Tissue Transglutaminase (tTG) has been identified                  as the endomysial antigen.  Studies have demonstr-                  ated that endomysial IgA antibodies have over 99%                  specificity for gluten sensitive enteropathy.         I hope you are feeling well.  Please call sooner if you have a problem, otherwise I'll see you as we had previously scheduled.        Thank you.    Electronically signed by:  Sasha Sanders MD 2025 04:29 PM  Document generated by:  Sahsa Sanders MD  2025  If your provider ordered multiple tests; the results may not become available at the same time.  If multiple test results are received within 14 days of one  another, you may receive a duplicate.  cc:  Triston Hills MD

## 2025-06-04 DIAGNOSIS — E11.65 TYPE 2 DIABETES MELLITUS WITH HYPERGLYCEMIA, WITH LONG-TERM CURRENT USE OF INSULIN (H): ICD-10-CM

## 2025-06-04 DIAGNOSIS — K21.00 GASTROESOPHAGEAL REFLUX DISEASE WITH ESOPHAGITIS, UNSPECIFIED WHETHER HEMORRHAGE: ICD-10-CM

## 2025-06-04 DIAGNOSIS — Z79.4 TYPE 2 DIABETES MELLITUS WITH HYPERGLYCEMIA, WITH LONG-TERM CURRENT USE OF INSULIN (H): ICD-10-CM

## 2025-06-04 RX ORDER — OMEPRAZOLE 40 MG/1
40 CAPSULE, DELAYED RELEASE ORAL DAILY
Qty: 90 CAPSULE | Refills: 1 | Status: SHIPPED | OUTPATIENT
Start: 2025-06-04

## 2025-06-04 RX ORDER — PROCHLORPERAZINE 25 MG/1
SUPPOSITORY RECTAL
Qty: 1 EACH | Refills: 1 | Status: SHIPPED | OUTPATIENT
Start: 2025-06-04

## 2025-06-15 ENCOUNTER — HEALTH MAINTENANCE LETTER (OUTPATIENT)
Age: 50
End: 2025-06-15

## 2025-07-03 DIAGNOSIS — E11.9 TYPE 2 DIABETES MELLITUS WITHOUT COMPLICATION, WITHOUT LONG-TERM CURRENT USE OF INSULIN (H): Chronic | ICD-10-CM

## 2025-07-03 RX ORDER — LISINOPRIL 2.5 MG/1
2.5 TABLET ORAL DAILY
Qty: 90 TABLET | Refills: 2 | Status: SHIPPED | OUTPATIENT
Start: 2025-07-03

## 2025-07-27 ENCOUNTER — HEALTH MAINTENANCE LETTER (OUTPATIENT)
Age: 50
End: 2025-07-27

## 2025-08-18 DIAGNOSIS — E11.9 TYPE 2 DIABETES MELLITUS WITHOUT COMPLICATION, WITHOUT LONG-TERM CURRENT USE OF INSULIN (H): Chronic | ICD-10-CM

## 2025-08-19 RX ORDER — DAPAGLIFLOZIN 10 MG/1
10 TABLET, FILM COATED ORAL DAILY
Qty: 90 TABLET | Refills: 0 | Status: SHIPPED | OUTPATIENT
Start: 2025-08-19